# Patient Record
Sex: FEMALE | Race: BLACK OR AFRICAN AMERICAN | NOT HISPANIC OR LATINO | Employment: FULL TIME | URBAN - METROPOLITAN AREA
[De-identification: names, ages, dates, MRNs, and addresses within clinical notes are randomized per-mention and may not be internally consistent; named-entity substitution may affect disease eponyms.]

---

## 2017-07-23 ENCOUNTER — HOSPITAL ENCOUNTER (EMERGENCY)
Facility: HOSPITAL | Age: 32
Discharge: HOME | End: 2017-07-24
Attending: EMERGENCY MEDICINE
Payer: MEDICAID

## 2017-07-23 DIAGNOSIS — R07.2 SUBSTERNAL CHEST PAIN: Primary | ICD-10-CM

## 2017-07-23 DIAGNOSIS — K21.00 GERD WITH ESOPHAGITIS: ICD-10-CM

## 2017-07-23 LAB
BASOPHILS # BLD AUTO: 0.01 K/UL
BASOPHILS NFR BLD: 0.1 %
DIFFERENTIAL METHOD: ABNORMAL
EOSINOPHIL # BLD AUTO: 0.1 K/UL
EOSINOPHIL NFR BLD: 1 %
ERYTHROCYTE [DISTWIDTH] IN BLOOD BY AUTOMATED COUNT: 12.5 %
HCT VFR BLD AUTO: 39.8 %
HGB BLD-MCNC: 12.8 G/DL
LYMPHOCYTES # BLD AUTO: 3.6 K/UL
LYMPHOCYTES NFR BLD: 50.4 %
MCH RBC QN AUTO: 27.7 PG
MCHC RBC AUTO-ENTMCNC: 32.2 G/DL
MCV RBC AUTO: 86 FL
MONOCYTES # BLD AUTO: 0.4 K/UL
MONOCYTES NFR BLD: 5 %
NEUTROPHILS # BLD AUTO: 3.1 K/UL
NEUTROPHILS NFR BLD: 43.5 %
PLATELET # BLD AUTO: 245 K/UL
PMV BLD AUTO: 11 FL
RBC # BLD AUTO: 4.62 M/UL
WBC # BLD AUTO: 7.22 K/UL

## 2017-07-23 PROCEDURE — 83880 ASSAY OF NATRIURETIC PEPTIDE: CPT

## 2017-07-23 PROCEDURE — 25000003 PHARM REV CODE 250: Performed by: EMERGENCY MEDICINE

## 2017-07-23 PROCEDURE — 85025 COMPLETE CBC W/AUTO DIFF WBC: CPT

## 2017-07-23 PROCEDURE — 84484 ASSAY OF TROPONIN QUANT: CPT

## 2017-07-23 PROCEDURE — 93005 ELECTROCARDIOGRAM TRACING: CPT

## 2017-07-23 PROCEDURE — 93010 ELECTROCARDIOGRAM REPORT: CPT | Mod: ,,, | Performed by: INTERNAL MEDICINE

## 2017-07-23 PROCEDURE — 81025 URINE PREGNANCY TEST: CPT | Performed by: EMERGENCY MEDICINE

## 2017-07-23 PROCEDURE — 80053 COMPREHEN METABOLIC PANEL: CPT

## 2017-07-23 PROCEDURE — 99284 EMERGENCY DEPT VISIT MOD MDM: CPT

## 2017-07-23 RX ORDER — ASPIRIN 325 MG
325 TABLET ORAL
Status: COMPLETED | OUTPATIENT
Start: 2017-07-23 | End: 2017-07-23

## 2017-07-23 RX ORDER — ASPIRIN 325 MG
325 TABLET ORAL
Status: DISCONTINUED | OUTPATIENT
Start: 2017-07-23 | End: 2017-07-23

## 2017-07-23 RX ADMIN — ASPIRIN 325 MG ORAL TABLET 325 MG: 325 PILL ORAL at 11:07

## 2017-07-23 RX ADMIN — LIDOCAINE HYDROCHLORIDE: 20 SOLUTION ORAL; TOPICAL at 11:07

## 2017-07-24 VITALS
BODY MASS INDEX: 40.22 KG/M2 | TEMPERATURE: 99 F | HEART RATE: 88 BPM | OXYGEN SATURATION: 96 % | SYSTOLIC BLOOD PRESSURE: 173 MMHG | HEIGHT: 63 IN | DIASTOLIC BLOOD PRESSURE: 89 MMHG | RESPIRATION RATE: 16 BRPM | WEIGHT: 227 LBS

## 2017-07-24 LAB
ALBUMIN SERPL BCP-MCNC: 3.3 G/DL
ALP SERPL-CCNC: 131 U/L
ALT SERPL W/O P-5'-P-CCNC: 17 U/L
ANION GAP SERPL CALC-SCNC: 7 MMOL/L
AST SERPL-CCNC: 10 U/L
B-HCG UR QL: NEGATIVE
BILIRUB SERPL-MCNC: 0.3 MG/DL
BNP SERPL-MCNC: <10 PG/ML
BUN SERPL-MCNC: 12 MG/DL
CALCIUM SERPL-MCNC: 9.4 MG/DL
CHLORIDE SERPL-SCNC: 101 MMOL/L
CO2 SERPL-SCNC: 26 MMOL/L
CREAT SERPL-MCNC: 1.1 MG/DL
CTP QC/QA: YES
EST. GFR  (AFRICAN AMERICAN): >60 ML/MIN/1.73 M^2
EST. GFR  (NON AFRICAN AMERICAN): >60 ML/MIN/1.73 M^2
GLUCOSE SERPL-MCNC: 413 MG/DL
POTASSIUM SERPL-SCNC: 3.8 MMOL/L
PROT SERPL-MCNC: 7.7 G/DL
SODIUM SERPL-SCNC: 134 MMOL/L
TROPONIN I SERPL DL<=0.01 NG/ML-MCNC: 0.01 NG/ML

## 2017-07-24 NOTE — ED PROVIDER NOTES
Encounter Date: 2017    SCRIBE #1 NOTE: I, Clarke Ledezma, am scribing for, and in the presence of,  Benny Guzman MD. I have scribed the following portions of the note - Other sections scribed: HPI, ROS.       History     Chief Complaint   Patient presents with    Chest Pain     pt comes to the er with chest pain that started today at 2pm the pain also goes down to her right arm.      CC: Chest Pain    HPI: This 31 y.o. female with a medical history of Diabetes mellitus; Fibroid, uterine; and Ovarian cyst presents to the ED c/o acute onset mid-sternal chest pain with difficulty taking deep breaths that began after getting out of the shower 9 hours ago. Chest pain feels sharp. Denies any chest pain with palpation. Denies any recent long distance travel. Denies fever, shortness of breath, leg swelling, nausea, dysuria, urinary frequency increase, or any other associated symptoms. Pt has no modifying factors. Pt has no prior treatment.       The history is provided by the patient. No  was used.     Review of patient's allergies indicates:  No Known Allergies  Past Medical History:   Diagnosis Date    Diabetes mellitus     Fibroid, uterine     Ovarian cyst      Past Surgical History:   Procedure Laterality Date     SECTION, CLASSIC      x 2    CYST REMOVAL      from both breast x 2     Family History   Problem Relation Age of Onset    Hypertension Mother     Diabetes Father     Sickle cell trait Brother     Diabetes Maternal Grandmother      Social History   Substance Use Topics    Smoking status: Never Smoker    Smokeless tobacco: Never Used    Alcohol use Yes      Comment: occasionally     Review of Systems   Constitutional: Negative for fever.   HENT: Negative for sore throat.    Respiratory: Negative for shortness of breath.    Cardiovascular: Positive for chest pain. Negative for leg swelling.   Gastrointestinal: Negative for nausea.   Genitourinary: Negative for  dysuria and frequency.   Musculoskeletal: Negative for back pain.   Skin: Negative for rash.   Neurological: Negative for weakness.   Hematological: Does not bruise/bleed easily.   All other systems reviewed and are negative.      Physical Exam     Initial Vitals [07/23/17 2301]   BP Pulse Resp Temp SpO2   126/78 93 16 98.5 °F (36.9 °C) 99 %      MAP       94         Physical Exam  Nursing note and vitals reviewed.  Constitutional:   HENT:    Head: NC/AT    Eyes: Conjunctivae normal.   (-) scleral icterus.              Mouth/Throat: MMM.     Neck: Neck supple, normal rom.   Cardiovascular: RRR  Pulmonary/Chest: CTAB   Abdominal: Soft. ND/NT w/o guarding or rebound.  (-) CVA tenderness.  Musculoskeletal: FROM of all major joints. No extremity edema or tenderness.  Neurological: A&Ox4, Normal speech.  No focal motor deficits.  Normal gait  Skin: Skin is warm and dry.   Psychiatric: normal mood and affect.      ED Course   Procedures  Labs Reviewed   CBC W/ AUTO DIFFERENTIAL - Abnormal; Notable for the following:        Result Value    Lymph% 50.4 (*)     All other components within normal limits   COMPREHENSIVE METABOLIC PANEL - Abnormal; Notable for the following:     Sodium 134 (*)     Glucose 413 (*)     Albumin 3.3 (*)     Anion Gap 7 (*)     All other components within normal limits   TROPONIN I   B-TYPE NATRIURETIC PEPTIDE   POCT URINE PREGNANCY     EKG Readings: (Independently Interpreted)   Initial Reading: No STEMI.   Normal sinus rhythm, rate 88, normal axis/intervals, nonspecific T-wave flattening.       X-Rays:   Independently Interpreted Readings:   Chest X-Ray: No infiltrates.  No acute abnormalities.  Normal heart size.     Medical Decision Making:   History:   Old Medical Records: I decided to obtain old medical records.  Old Records Summarized: records from clinic visits and other records.  Independently Interpreted Test(s):   I have ordered and independently interpreted X-rays - see prior notes.  I  have ordered and independently interpreted EKG Reading(s) - see prior notes  Clinical Tests:   Lab Tests: Ordered and Reviewed  Radiological Study: Ordered and Reviewed  Medical Tests: Ordered and Reviewed    Differential Diagnosis:   Initial differential diagnosis includes but is not limited to...ACS/MI, aortic dissection, pericarditis, PE, pneumothorax, pneumonia, costochondritis, gastritis, GERD, pancreatitis.      Additional Medical Decision Makin-year-old female presents the emergency department complaining of burning substernal chest pain. Basic labs wnls. Troponin negative. Chest x-ray without acute findings. After having received a GI cocktail, she reports complete symptomatic relief. Findings at this time are most consistent with GERD. Recommend daily H2-blocker and close follow-up with PCP. Chest pain precautions as well as return instructions discussed prior to discharge.              Scribe Attestation:   Scribe #1: I performed the above scribed service and the documentation accurately describes the services I performed. I attest to the accuracy of the note.    Attending Attestation:           Physician Attestation for Scribe:  Physician Attestation Statement for Scribe #1: I, Benny Guzman MD, reviewed documentation, as scribed by Clarke Ledezma in my presence, and it is both accurate and complete.                 ED Course     Clinical Impression:   The primary encounter diagnosis was Substernal chest pain. A diagnosis of GERD with esophagitis was also pertinent to this visit.    Disposition:   Disposition: Discharged                        Benny Guzman MD  17 [id number]

## 2017-07-24 NOTE — ED TRIAGE NOTES
Pt prenets to Ed with Left sided Chest pain that started about 2pm that hurts when she takes a deep breath or laugh. Reports that it was radiating down her left arm earlier.  Also reports back pain. Pt states that she had a massage on yesterday. Denies N/V, jaw pain, dizziness or weakness. Will continue to monitor.

## 2017-10-10 ENCOUNTER — HOSPITAL ENCOUNTER (EMERGENCY)
Facility: HOSPITAL | Age: 32
Discharge: HOME | End: 2017-10-11
Attending: EMERGENCY MEDICINE
Payer: MEDICAID

## 2017-10-10 VITALS
SYSTOLIC BLOOD PRESSURE: 120 MMHG | HEIGHT: 63 IN | BODY MASS INDEX: 39.87 KG/M2 | WEIGHT: 225 LBS | RESPIRATION RATE: 18 BRPM | TEMPERATURE: 99 F | DIASTOLIC BLOOD PRESSURE: 71 MMHG | OXYGEN SATURATION: 97 % | HEART RATE: 95 BPM

## 2017-10-10 DIAGNOSIS — Z91.148 NONCOMPLIANCE WITH MEDICATIONS: ICD-10-CM

## 2017-10-10 DIAGNOSIS — R73.9 HYPERGLYCEMIA: ICD-10-CM

## 2017-10-10 DIAGNOSIS — L02.416 ABSCESS OF LEFT THIGH: Primary | ICD-10-CM

## 2017-10-10 DIAGNOSIS — Z86.39 HISTORY OF DIABETES MELLITUS: ICD-10-CM

## 2017-10-10 LAB — POCT GLUCOSE: 329 MG/DL (ref 70–110)

## 2017-10-10 PROCEDURE — 10061 I&D ABSCESS COMP/MULTIPLE: CPT

## 2017-10-10 PROCEDURE — 99283 EMERGENCY DEPT VISIT LOW MDM: CPT | Mod: 25

## 2017-10-10 PROCEDURE — 82962 GLUCOSE BLOOD TEST: CPT

## 2017-10-11 LAB
B-HCG UR QL: NEGATIVE
CTP QC/QA: YES

## 2017-10-11 PROCEDURE — 10061 I&D ABSCESS COMP/MULTIPLE: CPT

## 2017-10-11 PROCEDURE — 25000003 PHARM REV CODE 250: Performed by: PHYSICIAN ASSISTANT

## 2017-10-11 PROCEDURE — 81025 URINE PREGNANCY TEST: CPT | Performed by: PHYSICIAN ASSISTANT

## 2017-10-11 RX ORDER — ACETAMINOPHEN 325 MG/1
650 TABLET ORAL
Status: COMPLETED | OUTPATIENT
Start: 2017-10-11 | End: 2017-10-11

## 2017-10-11 RX ORDER — LIDOCAINE HYDROCHLORIDE 10 MG/ML
10 INJECTION INFILTRATION; PERINEURAL
Status: COMPLETED | OUTPATIENT
Start: 2017-10-11 | End: 2017-10-11

## 2017-10-11 RX ORDER — SULFAMETHOXAZOLE AND TRIMETHOPRIM 800; 160 MG/1; MG/1
1 TABLET ORAL 2 TIMES DAILY
Qty: 20 TABLET | Refills: 0 | Status: SHIPPED | OUTPATIENT
Start: 2017-10-11 | End: 2017-10-21

## 2017-10-11 RX ORDER — SULFAMETHOXAZOLE AND TRIMETHOPRIM 800; 160 MG/1; MG/1
1 TABLET ORAL
Status: COMPLETED | OUTPATIENT
Start: 2017-10-11 | End: 2017-10-11

## 2017-10-11 RX ADMIN — LIDOCAINE HYDROCHLORIDE 10 ML: 10 INJECTION, SOLUTION INFILTRATION; PERINEURAL at 12:10

## 2017-10-11 RX ADMIN — ACETAMINOPHEN 650 MG: 325 TABLET ORAL at 12:10

## 2017-10-11 RX ADMIN — SULFAMETHOXAZOLE AND TRIMETHOPRIM 1 TABLET: 800; 160 TABLET ORAL at 12:10

## 2017-10-11 NOTE — ED PROVIDER NOTES
Encounter Date: 10/10/2017    SCRIBE #1 NOTE: I, Florentin Mir, am scribing for, and in the presence of,  Judd Gan PA-C. I have scribed the following portions of the note - Other sections scribed: HPI, ROS.       History     Chief Complaint   Patient presents with    Abscess     Pt reports an abscess on left inner thigh area with pain onset couple day ago.     CC: Abscess    31 y/o female with DM, uterine fibroid, and ovarian cyst presents to the ED c/o 2 month hx of acute onset abscess to L sided inner thigh with associated pain that radiates down L leg. The pain is severe (9/10) and progressively worsened today; palpation exacerbates the pain. The patient reports hx of abscess to her axilla, where an I&D was done. The patient's last dose of metformin was 5 days ago because she states she went to [place] and forgot the medication. The patient denies fever, N/V/D, chest pain, SOB, abdominal pain, dysuria, or difficulty urinating. No attempted treatment reported. No alleviating factors or other symptoms reported.      The history is provided by the patient. No  was used.     Review of patient's allergies indicates:  No Known Allergies  Past Medical History:   Diagnosis Date    Diabetes mellitus     Fibroid, uterine     Ovarian cyst      Past Surgical History:   Procedure Laterality Date     SECTION, CLASSIC      x 2    CYST REMOVAL      from both breast x 2     Family History   Problem Relation Age of Onset    Hypertension Mother     Diabetes Father     Sickle cell trait Brother     Diabetes Maternal Grandmother      Social History   Substance Use Topics    Smoking status: Never Smoker    Smokeless tobacco: Never Used    Alcohol use Yes      Comment: occasionally     Review of Systems   Constitutional: Negative for chills and fever.   HENT: Negative for rhinorrhea.    Eyes: Negative for redness.   Respiratory: Negative for cough and shortness of breath.     Cardiovascular: Negative for chest pain.   Gastrointestinal: Negative for abdominal pain, diarrhea, nausea and vomiting.   Genitourinary: Negative for difficulty urinating and dyspareunia.   Musculoskeletal: Negative for back pain.   Skin: Negative for rash.        (+) abscess to L sided inner thigh with associated pain that radiates down L leg   Neurological: Negative for headaches.       Physical Exam     Initial Vitals [10/10/17 2323]   BP Pulse Resp Temp SpO2   120/71 95 18 98.7 °F (37.1 °C) 97 %      MAP       87.33         Physical Exam    Nursing note and vitals reviewed.  Constitutional: She appears well-developed and well-nourished. She is not diaphoretic. No distress.   HENT:   Head: Normocephalic and atraumatic.   Nose: Nose normal.   Eyes: Conjunctivae and EOM are normal. Right eye exhibits no discharge. Left eye exhibits no discharge.   Neck: Normal range of motion. No tracheal deviation present. No JVD present.   Cardiovascular: Normal rate, regular rhythm and normal heart sounds. Exam reveals no friction rub.    No murmur heard.  Pulmonary/Chest: Breath sounds normal. No stridor. No respiratory distress. She has no wheezes. She has no rhonchi. She has no rales. She exhibits no tenderness.   Musculoskeletal: Normal range of motion.   Neurological: She is alert and oriented to person, place, and time.   Skin: Skin is warm and dry. No pallor.        3 cm x 1 similar linear area of erythema, tenderness, and warmth.  There is a 1 similar area of fluctuance.  No purulent drainage.         ED Course   I & D - Incision and Drainage  Date/Time: 10/11/2017 1:17 AM  Location procedure was performed: [facility] EMERGENCY DEPARTMENT  Performed by: SAM NOVAK  Authorized by: FRANCESCO WHITE   Consent Done: Yes  Consent: Verbal consent obtained.  Consent given by: patient  Type: abscess  Location: L proximal medial thigh.  Anesthesia: local infiltration    Anesthesia:  Local Anesthetic: lidocaine 1% without  epinephrine  Anesthetic total: 3 mL  Patient sedated: no  Description of findings: fluctuance   Risk factor: underlying major vessel  Scalpel size: 11  Incision type: single straight  Complexity: complex  Drainage: pus  Drainage amount: moderate  Wound treatment: incision,  wound left open,  deloculation and  expression of material  Packing material: 1/4 in gauze  Complications: No  Specimens: No  Implants: No  Patient tolerance: Patient tolerated the procedure well with no immediate complications        Labs Reviewed   POCT GLUCOSE - Abnormal; Notable for the following:        Result Value    POCT Glucose 329 (*)     All other components within normal limits   POCT URINE PREGNANCY             Medical Decision Making:   History:   Old Medical Records: I decided to obtain old medical records.      This is an emergent evaluation of a 32 y.o. female with a PMHx of DM presenting to the ED for abscess. Denies fever and urinary symptoms. Vitals WNL, afebrile. Patient is non-toxic appearing and in no acute distress. There is a fluctuant mass. with mild overlying cellulitis. No symptoms for systemic infection or evidence of necrotizing fasciitis.      Abscess drained and packed. Tetanus UTD per patient. Incidental hyperglycemia in this patient that has been off of her metformin for several days while on vacation. No evidence to suggest DKA at this time; advising she continue her metformin that she states she has at home. Discharged with Bactrim. Instructed to follow up with PCP for packing removal in 48 hours.     I discussed with the patient the diagnosis, treatment plan, indications for return to the emergency department, and for expected follow-up. The patient verbalized an understanding. The patient is asked if there are any questions or concerns. We discuss the case, until all issues are addressed to the patients satisfaction. Patient understands and is agreeable to the plan.     I discussed this patient with   Cutler who is in agreement with my assessment and plan.          Scribe Attestation:   Scribe #1: I performed the above scribed service and the documentation accurately describes the services I performed. I attest to the accuracy of the note.    Attending Attestation:           Physician Attestation for Scribe:  Physician Attestation Statement for Scribe #1: I, Judd Gan PA-C, reviewed documentation, as scribed by Florentin Mir in my presence, and it is both accurate and complete.                 ED Course      Clinical Impression:   The primary encounter diagnosis was Abscess of left thigh. Diagnoses of Hyperglycemia, Noncompliance with medications, and History of diabetes mellitus were also pertinent to this visit.    Disposition:   Disposition: Discharged  Condition: Stable                        Judd Villar PA-C  10/11/17 0118

## 2017-10-11 NOTE — ED TRIAGE NOTES
"Pt c/o LLE inner upper thigh abscess which has been 2 months and been getting worse.  States that it has began to hurt "worse" this evening.   Pt rates pain as 10 when ambulation  "

## 2017-11-14 ENCOUNTER — HOSPITAL ENCOUNTER (OUTPATIENT)
Facility: HOSPITAL | Age: 32
LOS: 1 days | Discharge: HOME | End: 2017-11-15
Attending: EMERGENCY MEDICINE | Admitting: INTERNAL MEDICINE
Payer: MEDICAID

## 2017-11-14 DIAGNOSIS — R19.7 DIARRHEA, UNSPECIFIED TYPE: ICD-10-CM

## 2017-11-14 DIAGNOSIS — K52.9 ENTERITIS: Primary | ICD-10-CM

## 2017-11-14 DIAGNOSIS — R73.9 HYPERGLYCEMIA: ICD-10-CM

## 2017-11-14 DIAGNOSIS — R10.9 ABDOMINAL PAIN, UNSPECIFIED ABDOMINAL LOCATION: ICD-10-CM

## 2017-11-14 DIAGNOSIS — R00.0 TACHYCARDIA: ICD-10-CM

## 2017-11-14 DIAGNOSIS — E86.0 DEHYDRATION: ICD-10-CM

## 2017-11-14 DIAGNOSIS — R07.9 CHEST PAIN: ICD-10-CM

## 2017-11-14 LAB
ALBUMIN SERPL BCP-MCNC: 4 G/DL
ALP SERPL-CCNC: 158 U/L
ALT SERPL W/O P-5'-P-CCNC: 26 U/L
AMORPH CRY URNS QL MICRO: NORMAL
ANION GAP SERPL CALC-SCNC: 12 MMOL/L
AST SERPL-CCNC: 19 U/L
B-HCG UR QL: NEGATIVE
BACTERIA #/AREA URNS HPF: NORMAL /HPF
BASOPHILS # BLD AUTO: 0.01 K/UL
BASOPHILS NFR BLD: 0.1 %
BILIRUB SERPL-MCNC: 0.6 MG/DL
BILIRUB UR QL STRIP: NEGATIVE
BNP SERPL-MCNC: <10 PG/ML
BUN SERPL-MCNC: 12 MG/DL
CALCIUM SERPL-MCNC: 9.7 MG/DL
CHLORIDE SERPL-SCNC: 99 MMOL/L
CLARITY UR: ABNORMAL
CO2 SERPL-SCNC: 25 MMOL/L
COLOR UR: YELLOW
CREAT SERPL-MCNC: 0.9 MG/DL
CTP QC/QA: YES
DIFFERENTIAL METHOD: ABNORMAL
EOSINOPHIL # BLD AUTO: 0 K/UL
EOSINOPHIL NFR BLD: 0.5 %
ERYTHROCYTE [DISTWIDTH] IN BLOOD BY AUTOMATED COUNT: 12.2 %
EST. GFR  (AFRICAN AMERICAN): >60 ML/MIN/1.73 M^2
EST. GFR  (NON AFRICAN AMERICAN): >60 ML/MIN/1.73 M^2
GLUCOSE SERPL-MCNC: 244 MG/DL
GLUCOSE UR QL STRIP: ABNORMAL
HCT VFR BLD AUTO: 45.5 %
HGB BLD-MCNC: 15.3 G/DL
HGB UR QL STRIP: NEGATIVE
KETONES UR QL STRIP: NEGATIVE
LACTATE SERPL-SCNC: 1.3 MMOL/L
LEUKOCYTE ESTERASE UR QL STRIP: NEGATIVE
LIPASE SERPL-CCNC: 42 U/L
LYMPHOCYTES # BLD AUTO: 1.5 K/UL
LYMPHOCYTES NFR BLD: 20.7 %
MCH RBC QN AUTO: 28.1 PG
MCHC RBC AUTO-ENTMCNC: 33.6 G/DL
MCV RBC AUTO: 84 FL
MICROSCOPIC COMMENT: NORMAL
MONOCYTES # BLD AUTO: 0.4 K/UL
MONOCYTES NFR BLD: 4.9 %
NEUTROPHILS # BLD AUTO: 5.5 K/UL
NEUTROPHILS NFR BLD: 73.7 %
NITRITE UR QL STRIP: NEGATIVE
PH UR STRIP: 5 [PH] (ref 5–8)
PLATELET # BLD AUTO: 274 K/UL
PMV BLD AUTO: 10.6 FL
POTASSIUM SERPL-SCNC: 4 MMOL/L
PROT SERPL-MCNC: 9.2 G/DL
PROT UR QL STRIP: NEGATIVE
RBC # BLD AUTO: 5.44 M/UL
SODIUM SERPL-SCNC: 136 MMOL/L
SP GR UR STRIP: >1.03 (ref 1–1.03)
TROPONIN I SERPL DL<=0.01 NG/ML-MCNC: <0.006 NG/ML
URN SPEC COLLECT METH UR: ABNORMAL
UROBILINOGEN UR STRIP-ACNC: NEGATIVE EU/DL
WBC # BLD AUTO: 7.4 K/UL
YEAST URNS QL MICRO: NORMAL

## 2017-11-14 PROCEDURE — 25000003 PHARM REV CODE 250: Performed by: EMERGENCY MEDICINE

## 2017-11-14 PROCEDURE — 96375 TX/PRO/DX INJ NEW DRUG ADDON: CPT

## 2017-11-14 PROCEDURE — 25500020 PHARM REV CODE 255: Performed by: EMERGENCY MEDICINE

## 2017-11-14 PROCEDURE — G0378 HOSPITAL OBSERVATION PER HR: HCPCS

## 2017-11-14 PROCEDURE — 83880 ASSAY OF NATRIURETIC PEPTIDE: CPT

## 2017-11-14 PROCEDURE — 99285 EMERGENCY DEPT VISIT HI MDM: CPT | Mod: 25

## 2017-11-14 PROCEDURE — 12000002 HC ACUTE/MED SURGE SEMI-PRIVATE ROOM

## 2017-11-14 PROCEDURE — 93005 ELECTROCARDIOGRAM TRACING: CPT

## 2017-11-14 PROCEDURE — S0028 INJECTION, FAMOTIDINE, 20 MG: HCPCS | Performed by: EMERGENCY MEDICINE

## 2017-11-14 PROCEDURE — 83605 ASSAY OF LACTIC ACID: CPT

## 2017-11-14 PROCEDURE — 96365 THER/PROPH/DIAG IV INF INIT: CPT

## 2017-11-14 PROCEDURE — 93010 ELECTROCARDIOGRAM REPORT: CPT | Mod: ,,, | Performed by: INTERNAL MEDICINE

## 2017-11-14 PROCEDURE — 81025 URINE PREGNANCY TEST: CPT | Performed by: EMERGENCY MEDICINE

## 2017-11-14 PROCEDURE — 96361 HYDRATE IV INFUSION ADD-ON: CPT

## 2017-11-14 PROCEDURE — 85025 COMPLETE CBC W/AUTO DIFF WBC: CPT

## 2017-11-14 PROCEDURE — 84484 ASSAY OF TROPONIN QUANT: CPT

## 2017-11-14 PROCEDURE — 81000 URINALYSIS NONAUTO W/SCOPE: CPT

## 2017-11-14 PROCEDURE — 83690 ASSAY OF LIPASE: CPT

## 2017-11-14 PROCEDURE — 80053 COMPREHEN METABOLIC PANEL: CPT

## 2017-11-14 PROCEDURE — 63600175 PHARM REV CODE 636 W HCPCS: Performed by: EMERGENCY MEDICINE

## 2017-11-14 PROCEDURE — 96372 THER/PROPH/DIAG INJ SC/IM: CPT

## 2017-11-14 PROCEDURE — 87040 BLOOD CULTURE FOR BACTERIA: CPT

## 2017-11-14 RX ORDER — HYDROMORPHONE HYDROCHLORIDE 2 MG/ML
1 INJECTION, SOLUTION INTRAMUSCULAR; INTRAVENOUS; SUBCUTANEOUS
Status: COMPLETED | OUTPATIENT
Start: 2017-11-14 | End: 2017-11-14

## 2017-11-14 RX ORDER — DICYCLOMINE HYDROCHLORIDE 20 MG/1
20 TABLET ORAL 2 TIMES DAILY
Qty: 15 TABLET | Refills: 0 | Status: SHIPPED | OUTPATIENT
Start: 2017-11-14 | End: 2017-11-15 | Stop reason: HOSPADM

## 2017-11-14 RX ORDER — CIPROFLOXACIN 2 MG/ML
400 INJECTION, SOLUTION INTRAVENOUS
Status: DISCONTINUED | OUTPATIENT
Start: 2017-11-14 | End: 2017-11-15

## 2017-11-14 RX ORDER — FAMOTIDINE 10 MG/ML
20 INJECTION INTRAVENOUS
Status: COMPLETED | OUTPATIENT
Start: 2017-11-14 | End: 2017-11-14

## 2017-11-14 RX ORDER — DIPHENHYDRAMINE HYDROCHLORIDE 50 MG/ML
25 INJECTION INTRAMUSCULAR; INTRAVENOUS
Status: COMPLETED | OUTPATIENT
Start: 2017-11-14 | End: 2017-11-14

## 2017-11-14 RX ORDER — DICYCLOMINE HYDROCHLORIDE 10 MG/ML
20 INJECTION INTRAMUSCULAR
Status: COMPLETED | OUTPATIENT
Start: 2017-11-14 | End: 2017-11-14

## 2017-11-14 RX ORDER — ONDANSETRON 2 MG/ML
4 INJECTION INTRAMUSCULAR; INTRAVENOUS
Status: COMPLETED | OUTPATIENT
Start: 2017-11-14 | End: 2017-11-14

## 2017-11-14 RX ORDER — METRONIDAZOLE 500 MG/100ML
500 INJECTION, SOLUTION INTRAVENOUS
Status: DISCONTINUED | OUTPATIENT
Start: 2017-11-15 | End: 2017-11-15

## 2017-11-14 RX ORDER — SODIUM CHLORIDE 9 MG/ML
1000 INJECTION, SOLUTION INTRAVENOUS
Status: COMPLETED | OUTPATIENT
Start: 2017-11-14 | End: 2017-11-14

## 2017-11-14 RX ADMIN — SODIUM CHLORIDE 1000 ML: 0.9 INJECTION, SOLUTION INTRAVENOUS at 04:11

## 2017-11-14 RX ADMIN — CIPROFLOXACIN 400 MG: 2 INJECTION, SOLUTION INTRAVENOUS at 11:11

## 2017-11-14 RX ADMIN — SODIUM CHLORIDE 1000 ML: 0.9 INJECTION, SOLUTION INTRAVENOUS at 09:11

## 2017-11-14 RX ADMIN — HYDROMORPHONE HYDROCHLORIDE 1 MG: 2 INJECTION, SOLUTION INTRAMUSCULAR; INTRAVENOUS; SUBCUTANEOUS at 10:11

## 2017-11-14 RX ADMIN — FAMOTIDINE 20 MG: 10 INJECTION, SOLUTION INTRAVENOUS at 05:11

## 2017-11-14 RX ADMIN — SODIUM CHLORIDE 1000 ML: 0.9 INJECTION, SOLUTION INTRAVENOUS at 06:11

## 2017-11-14 RX ADMIN — ONDANSETRON 4 MG: 2 INJECTION INTRAMUSCULAR; INTRAVENOUS at 04:11

## 2017-11-14 RX ADMIN — DICYCLOMINE HYDROCHLORIDE 20 MG: 10 INJECTION INTRAMUSCULAR at 07:11

## 2017-11-14 RX ADMIN — DIPHENHYDRAMINE HYDROCHLORIDE 25 MG: 50 INJECTION, SOLUTION INTRAMUSCULAR; INTRAVENOUS at 06:11

## 2017-11-14 RX ADMIN — IOHEXOL 70 ML: 350 INJECTION, SOLUTION INTRAVENOUS at 09:11

## 2017-11-14 NOTE — ED TRIAGE NOTES
Pt with c/o diarrhea x 3 days, dysuria that began last night, chills, dizziness, low back pain, and body aches that began today. Pt denies fever.

## 2017-11-14 NOTE — ED PROVIDER NOTES
"Encounter Date: 2017    SCRIBE #1 NOTE: I, Sim Calabrese, am scribing for, and in the presence of,  Deidra Contreras PA-C. I have scribed the following portions of the note - Other sections scribed: HPI and ROS.       History     Chief Complaint   Patient presents with    Gastroesophageal Reflux     States since she woke up today she has acid reflux, chills, body aches, diarrhea, and nausea.    Chills    Generalized Body Aches     CC: Gastroesophageal Reflux    HPI: Patient is a 32 y.o. female with PMHx insulin-independent DM who presents to ED c/o generalized abdominal pain with associated nausea, SOB, and midsternal "burning." She collectively describes her symptoms as "indigestion." Patient additionally c/o chills and dizziness that started today as well as a 3-day history of diarrhea. Her abdominal pain is not relieved when she has bowel movements. The patient states that her child has recently complained of abdominal pain but otherwise no sick contact reported. She has not received a flu shot for this year.     Patient endorses having dysuria. No hematuria reported. Her last menstrual period was on 17. Her last blood sugar measurement was 260 mg/dl. She reports that her blood sugar normally hovers around 300 mg/dl. No treatment attempted at this time. She denies emesis, fever, congestion, cough, and blood in stool.      The history is provided by the patient. No  was used.     Review of patient's allergies indicates:  No Known Allergies  Past Medical History:   Diagnosis Date    Diabetes mellitus     Fibroid, uterine     Ovarian cyst      Past Surgical History:   Procedure Laterality Date     SECTION, CLASSIC      x 2    CYST REMOVAL      from both breast x 2     Family History   Problem Relation Age of Onset    Hypertension Mother     Diabetes Father     Sickle cell trait Brother     Diabetes Maternal Grandmother      Social History   Substance Use Topics    " Smoking status: Never Smoker    Smokeless tobacco: Never Used    Alcohol use Yes      Comment: occasionally     Review of Systems   Constitutional: Positive for chills. Negative for diaphoresis and fever.   HENT: Negative for congestion and sore throat.    Eyes: Negative for visual disturbance.   Respiratory: Positive for shortness of breath. Negative for cough.    Cardiovascular: Negative for chest pain.   Gastrointestinal: Positive for abdominal pain (generalized), diarrhea and nausea. Negative for blood in stool and vomiting.   Genitourinary: Positive for dysuria. Negative for hematuria.   Musculoskeletal: Negative for back pain.   Skin: Negative for rash.   Neurological: Positive for dizziness. Negative for weakness and headaches.   Hematological: Does not bruise/bleed easily.       Physical Exam     Initial Vitals [11/14/17 1527]   BP Pulse Resp Temp SpO2   138/87 (!) 111 16 98.7 °F (37.1 °C) 100 %      MAP       104         Physical Exam    Nursing note and vitals reviewed.  Constitutional: She appears well-developed and well-nourished. She is not diaphoretic. No distress.   This patient is sitting comfortably on the exam table, calm and cooperative.   HENT:   Head: Normocephalic and atraumatic.   Right Ear: External ear normal.   Left Ear: External ear normal.   Nose: Nose normal.   Mouth/Throat: Oropharynx is clear and moist.   Eyes: EOM are normal. Pupils are equal, round, and reactive to light.   Neck: Normal range of motion. Neck supple.   Cardiovascular: Regular rhythm.   Tachycardia   Pulmonary/Chest: Breath sounds normal. No respiratory distress. She has no wheezes. She has no rhonchi. She has no rales. She exhibits no tenderness.   Abdominal: Soft. Bowel sounds are normal. She exhibits no distension. There is tenderness (there is diffuse tenderness to palpation.  No focal tenderness to palpation). There is no rebound and no guarding.   Neurological: She is alert and oriented to person, place, and  time.   Skin: Skin is warm. Capillary refill takes less than 2 seconds. No erythema.         ED Course   Procedures  Labs Reviewed   CBC W/ AUTO DIFFERENTIAL - Abnormal; Notable for the following:        Result Value    RBC 5.44 (*)     Gran% 73.7 (*)     All other components within normal limits   COMPREHENSIVE METABOLIC PANEL - Abnormal; Notable for the following:     Glucose 244 (*)     Total Protein 9.2 (*)     Alkaline Phosphatase 158 (*)     All other components within normal limits   URINALYSIS - Abnormal; Notable for the following:     Appearance, UA Cloudy (*)     Specific Gravity, UA >1.030 (*)     Glucose, UA 3+ (*)     All other components within normal limits   LIPASE   URINALYSIS MICROSCOPIC   TROPONIN I   B-TYPE NATRIURETIC PEPTIDE   POCT URINE PREGNANCY     EKG Readings: (Independently Interpreted)   Initial Reading: No STEMI. Rhythm: Sinus Tachycardia. Heart Rate: 110. Ectopy: No Ectopy. Conduction: Normal.       X-Rays:   Independently Interpreted Readings:   Other Readings:  Chest x-ray reveals no acute cardiopulmonary processes.    Medical Decision Making:   Differential Diagnosis:   This is an emergent evaluation of a 32-year-old female presents to the emergency department complaining of generalized abdominal pain, nausea, midsternal burning that started this morning.  She also complains of a three-day history of diarrhea.    Previous medical records were obtained and reviewed.  This patient has a history of non-insulin-dependent diabetes.  The patient is currently afebrile and nontoxic in appearance.  At triage, she was tachycardic at 111 bpm.  On physical exam, she was sitting comfortably on the exam table.  Calm and cooperative.  She is tachycardic.  On exam of the abdomen there is some diffuse tenderness to palpation without focal tenderness noted.  There is no rebound or guarding.  There is no CVA tenderness appreciated.  The remaining physical exam is unremarkable.  An IV was started and  fluids are given.  Blood was drawn and urine was collected.  A chest x-ray was performed which revealed no acute cardiopulmonary processes.  An EKG revealed sinus tachycardia without any ST elevation changes.  Troponin and BNP were unremarkable.  A CBC is without leukocytosis or anemia.  CMP is remarkable for a glucose of 244.  The remaining CMP is unremarkable.  Lipase is noted to be 42.  Rapid urinalysis is without any evidence of urinary tract infection.      On reevaluation, the patient continued to complain of pain.  IV Pepcid was given with relief.  Also IM Bentyl was also administered.  The patient was also still tachycardic.  Therefore a second fluid bolus was administered in the emergency department.  This patient was also seen by Dr. Garcia.    I carefully considered but doubt acute appendicitis, ACS, acute cholecystitis, acute diverticulitis, urinary tract infection, pyelonephritis.  I do not believe that the patient has ovarian torsion, tubo-ovarian abscess or pelvic inflammatory disease, pneumonia.  I believe this is a viral illness.  I will encourage plenty of fluids, rest.  Strong abdominal return precautions were thoroughly reviewed with patient and she verbalized understanding and agreement.  She stable for discharge with PCP follow-up this week.  The patient was also instructed to talk to her primary care physician about her hyperglycemia.  Her non-insulin-dependent diabetes is not well-controlled at this time.  She is stable for discharge at this time.            Scribe Attestation:   Scribe #1: I performed the above scribed service and the documentation accurately describes the services I performed. I attest to the accuracy of the note.    Attending Attestation:           Physician Attestation for Scribe:  Physician Attestation Statement for Scribe #1: I, Deidra Contreras PA-C, reviewed documentation, as scribed by Sim Calabrese in my presence, and it is both accurate and complete.                  ED Course      Clinical Impression:   The primary encounter diagnosis was Abdominal pain, unspecified abdominal location. Diagnoses of Chest pain, Diarrhea, unspecified type, Chest pain with noncardiac features, and Hyperglycemia were also pertinent to this visit.    Disposition:   Disposition: Discharged  Condition: Stable                        Deidra Contreras PA-C  11/14/17 1935

## 2017-11-15 VITALS
HEART RATE: 92 BPM | HEIGHT: 64 IN | TEMPERATURE: 98 F | BODY MASS INDEX: 39.08 KG/M2 | WEIGHT: 228.88 LBS | DIASTOLIC BLOOD PRESSURE: 68 MMHG | RESPIRATION RATE: 18 BRPM | SYSTOLIC BLOOD PRESSURE: 119 MMHG | OXYGEN SATURATION: 96 %

## 2017-11-15 PROBLEM — B37.31 VAGINAL CANDIDIASIS: Status: ACTIVE | Noted: 2017-11-15

## 2017-11-15 PROBLEM — K76.0 HEPATIC STEATOSIS: Status: ACTIVE | Noted: 2017-11-15

## 2017-11-15 PROBLEM — I95.1 ORTHOSTATIC HYPOTENSION: Status: ACTIVE | Noted: 2017-11-15

## 2017-11-15 PROBLEM — E66.09 OBESITY DUE TO EXCESS CALORIES WITH SERIOUS COMORBIDITY: Status: ACTIVE | Noted: 2017-11-15

## 2017-11-15 PROBLEM — R00.0 SINUS TACHYCARDIA: Status: ACTIVE | Noted: 2017-11-15

## 2017-11-15 PROBLEM — R10.9 ABDOMINAL CRAMPING: Status: ACTIVE | Noted: 2017-11-15

## 2017-11-15 PROBLEM — I95.1 ORTHOSTATIC HYPOTENSION: Status: RESOLVED | Noted: 2017-11-15 | Resolved: 2017-11-15

## 2017-11-15 PROBLEM — R00.0 SINUS TACHYCARDIA: Status: RESOLVED | Noted: 2017-11-15 | Resolved: 2017-11-15

## 2017-11-15 PROBLEM — E11.65 UNCONTROLLED TYPE 2 DIABETES MELLITUS WITH HYPERGLYCEMIA, WITHOUT LONG-TERM CURRENT USE OF INSULIN: Status: ACTIVE | Noted: 2017-11-15

## 2017-11-15 PROBLEM — A09 DIARRHEA OF INFECTIOUS ORIGIN: Status: ACTIVE | Noted: 2017-11-15

## 2017-11-15 PROBLEM — A08.4 VIRAL ENTERITIS: Status: ACTIVE | Noted: 2017-11-14

## 2017-11-15 PROBLEM — M79.10 VIRAL MYALGIA: Status: ACTIVE | Noted: 2017-11-15

## 2017-11-15 PROBLEM — B97.89 VIRAL MYALGIA: Status: ACTIVE | Noted: 2017-11-15

## 2017-11-15 LAB
ALBUMIN SERPL BCP-MCNC: 2.7 G/DL
ALP SERPL-CCNC: 105 U/L
ALT SERPL W/O P-5'-P-CCNC: 23 U/L
ANION GAP SERPL CALC-SCNC: 9 MMOL/L
AST SERPL-CCNC: 17 U/L
BASOPHILS # BLD AUTO: 0.01 K/UL
BASOPHILS NFR BLD: 0.2 %
BILIRUB SERPL-MCNC: 0.5 MG/DL
BUN SERPL-MCNC: 8 MG/DL
CALCIUM SERPL-MCNC: 8 MG/DL
CHLORIDE SERPL-SCNC: 103 MMOL/L
CO2 SERPL-SCNC: 23 MMOL/L
CREAT SERPL-MCNC: 0.8 MG/DL
DIFFERENTIAL METHOD: NORMAL
EOSINOPHIL # BLD AUTO: 0 K/UL
EOSINOPHIL NFR BLD: 0.5 %
ERYTHROCYTE [DISTWIDTH] IN BLOOD BY AUTOMATED COUNT: 12.1 %
EST. GFR  (AFRICAN AMERICAN): >60 ML/MIN/1.73 M^2
EST. GFR  (NON AFRICAN AMERICAN): >60 ML/MIN/1.73 M^2
GLUCOSE SERPL-MCNC: 281 MG/DL
HCT VFR BLD AUTO: 38.8 %
HGB BLD-MCNC: 12.6 G/DL
LYMPHOCYTES # BLD AUTO: 2 K/UL
LYMPHOCYTES NFR BLD: 33.8 %
MCH RBC QN AUTO: 28 PG
MCHC RBC AUTO-ENTMCNC: 32.5 G/DL
MCV RBC AUTO: 86 FL
MONOCYTES # BLD AUTO: 0.4 K/UL
MONOCYTES NFR BLD: 7.4 %
NEUTROPHILS # BLD AUTO: 3.4 K/UL
NEUTROPHILS NFR BLD: 57.9 %
PLATELET # BLD AUTO: 237 K/UL
PMV BLD AUTO: 10.6 FL
POCT GLUCOSE: 238 MG/DL (ref 70–110)
POCT GLUCOSE: 265 MG/DL (ref 70–110)
POCT GLUCOSE: 272 MG/DL (ref 70–110)
POCT GLUCOSE: 277 MG/DL (ref 70–110)
POTASSIUM SERPL-SCNC: 3.8 MMOL/L
PROT SERPL-MCNC: 6.5 G/DL
RBC # BLD AUTO: 4.5 M/UL
SODIUM SERPL-SCNC: 135 MMOL/L
WBC # BLD AUTO: 5.8 K/UL

## 2017-11-15 PROCEDURE — 36415 COLL VENOUS BLD VENIPUNCTURE: CPT

## 2017-11-15 PROCEDURE — 63600175 PHARM REV CODE 636 W HCPCS: Performed by: EMERGENCY MEDICINE

## 2017-11-15 PROCEDURE — S0030 INJECTION, METRONIDAZOLE: HCPCS | Performed by: EMERGENCY MEDICINE

## 2017-11-15 PROCEDURE — G0378 HOSPITAL OBSERVATION PER HR: HCPCS

## 2017-11-15 PROCEDURE — 80053 COMPREHEN METABOLIC PANEL: CPT

## 2017-11-15 PROCEDURE — 25000003 PHARM REV CODE 250: Performed by: EMERGENCY MEDICINE

## 2017-11-15 PROCEDURE — 25000003 PHARM REV CODE 250: Performed by: INTERNAL MEDICINE

## 2017-11-15 PROCEDURE — 85025 COMPLETE CBC W/AUTO DIFF WBC: CPT

## 2017-11-15 PROCEDURE — 63600175 PHARM REV CODE 636 W HCPCS: Performed by: INTERNAL MEDICINE

## 2017-11-15 RX ORDER — IBUPROFEN 600 MG/1
600 TABLET ORAL ONCE
Status: COMPLETED | OUTPATIENT
Start: 2017-11-15 | End: 2017-11-15

## 2017-11-15 RX ORDER — MORPHINE SULFATE 10 MG/ML
2 INJECTION INTRAMUSCULAR; INTRAVENOUS; SUBCUTANEOUS EVERY 4 HOURS PRN
Status: DISCONTINUED | OUTPATIENT
Start: 2017-11-15 | End: 2017-11-15

## 2017-11-15 RX ORDER — IBUPROFEN 200 MG
16 TABLET ORAL
Status: DISCONTINUED | OUTPATIENT
Start: 2017-11-15 | End: 2017-11-15 | Stop reason: HOSPADM

## 2017-11-15 RX ORDER — INSULIN ASPART 100 [IU]/ML
1-10 INJECTION, SOLUTION INTRAVENOUS; SUBCUTANEOUS
Status: DISCONTINUED | OUTPATIENT
Start: 2017-11-15 | End: 2017-11-15 | Stop reason: HOSPADM

## 2017-11-15 RX ORDER — MORPHINE SULFATE 10 MG/ML
4 INJECTION INTRAMUSCULAR; INTRAVENOUS; SUBCUTANEOUS EVERY 4 HOURS PRN
Status: DISCONTINUED | OUTPATIENT
Start: 2017-11-15 | End: 2017-11-15

## 2017-11-15 RX ORDER — INSULIN ASPART 100 [IU]/ML
0-5 INJECTION, SOLUTION INTRAVENOUS; SUBCUTANEOUS
Status: DISCONTINUED | OUTPATIENT
Start: 2017-11-15 | End: 2017-11-15

## 2017-11-15 RX ORDER — ONDANSETRON 8 MG/1
8 TABLET, ORALLY DISINTEGRATING ORAL EVERY 8 HOURS PRN
Status: DISCONTINUED | OUTPATIENT
Start: 2017-11-15 | End: 2017-11-15 | Stop reason: HOSPADM

## 2017-11-15 RX ORDER — ASPIRIN 325 MG
1 TABLET, DELAYED RELEASE (ENTERIC COATED) ORAL ONCE
Status: COMPLETED | OUTPATIENT
Start: 2017-11-15 | End: 2017-11-15

## 2017-11-15 RX ORDER — FAMOTIDINE 20 MG/1
20 TABLET, FILM COATED ORAL DAILY
Status: DISCONTINUED | OUTPATIENT
Start: 2017-11-15 | End: 2017-11-15 | Stop reason: HOSPADM

## 2017-11-15 RX ORDER — IBUPROFEN 200 MG
24 TABLET ORAL
Status: DISCONTINUED | OUTPATIENT
Start: 2017-11-15 | End: 2017-11-15 | Stop reason: HOSPADM

## 2017-11-15 RX ORDER — ACETAMINOPHEN 325 MG/1
650 TABLET ORAL EVERY 8 HOURS PRN
Status: DISCONTINUED | OUTPATIENT
Start: 2017-11-15 | End: 2017-11-15 | Stop reason: HOSPADM

## 2017-11-15 RX ORDER — AMOXICILLIN 250 MG
1 CAPSULE ORAL 2 TIMES DAILY
Status: DISCONTINUED | OUTPATIENT
Start: 2017-11-15 | End: 2017-11-15

## 2017-11-15 RX ORDER — INSULIN ASPART 100 [IU]/ML
0-5 INJECTION, SOLUTION INTRAVENOUS; SUBCUTANEOUS EVERY 6 HOURS PRN
Status: DISCONTINUED | OUTPATIENT
Start: 2017-11-15 | End: 2017-11-15

## 2017-11-15 RX ORDER — INSULIN ASPART 100 [IU]/ML
2 INJECTION, SOLUTION INTRAVENOUS; SUBCUTANEOUS
Status: DISCONTINUED | OUTPATIENT
Start: 2017-11-15 | End: 2017-11-15 | Stop reason: HOSPADM

## 2017-11-15 RX ORDER — ENOXAPARIN SODIUM 100 MG/ML
40 INJECTION SUBCUTANEOUS EVERY 24 HOURS
Status: DISCONTINUED | OUTPATIENT
Start: 2017-11-15 | End: 2017-11-15 | Stop reason: HOSPADM

## 2017-11-15 RX ORDER — GLUCAGON 1 MG
1 KIT INJECTION
Status: DISCONTINUED | OUTPATIENT
Start: 2017-11-15 | End: 2017-11-15 | Stop reason: HOSPADM

## 2017-11-15 RX ORDER — SODIUM CHLORIDE 9 MG/ML
INJECTION, SOLUTION INTRAVENOUS CONTINUOUS
Status: DISCONTINUED | OUTPATIENT
Start: 2017-11-15 | End: 2017-11-15

## 2017-11-15 RX ORDER — SODIUM CHLORIDE 0.9 % (FLUSH) 0.9 %
3 SYRINGE (ML) INJECTION EVERY 8 HOURS
Status: DISCONTINUED | OUTPATIENT
Start: 2017-11-15 | End: 2017-11-15 | Stop reason: HOSPADM

## 2017-11-15 RX ORDER — HEPARIN SODIUM 5000 [USP'U]/ML
5000 INJECTION, SOLUTION INTRAVENOUS; SUBCUTANEOUS EVERY 8 HOURS
Status: DISCONTINUED | OUTPATIENT
Start: 2017-11-15 | End: 2017-11-15

## 2017-11-15 RX ORDER — ACETAMINOPHEN 325 MG/1
650 TABLET ORAL EVERY 6 HOURS PRN
Status: DISCONTINUED | OUTPATIENT
Start: 2017-11-15 | End: 2017-11-15 | Stop reason: HOSPADM

## 2017-11-15 RX ADMIN — INSULIN ASPART 4 UNITS: 100 INJECTION, SOLUTION INTRAVENOUS; SUBCUTANEOUS at 12:11

## 2017-11-15 RX ADMIN — METRONIDAZOLE 500 MG: 500 INJECTION, SOLUTION INTRAVENOUS at 08:11

## 2017-11-15 RX ADMIN — METRONIDAZOLE 500 MG: 500 INJECTION, SOLUTION INTRAVENOUS at 12:11

## 2017-11-15 RX ADMIN — INSULIN ASPART 6 UNITS: 100 INJECTION, SOLUTION INTRAVENOUS; SUBCUTANEOUS at 08:11

## 2017-11-15 RX ADMIN — CLOTRIMAZOLE 1 APPLICATOR: 1 CREAM VAGINAL at 10:11

## 2017-11-15 RX ADMIN — HEPARIN SODIUM 5000 UNITS: 5000 INJECTION, SOLUTION INTRAVENOUS; SUBCUTANEOUS at 06:11

## 2017-11-15 RX ADMIN — SODIUM CHLORIDE: 0.9 INJECTION, SOLUTION INTRAVENOUS at 06:11

## 2017-11-15 RX ADMIN — ACETAMINOPHEN 650 MG: 325 TABLET ORAL at 06:11

## 2017-11-15 RX ADMIN — CIPROFLOXACIN 400 MG: 2 INJECTION, SOLUTION INTRAVENOUS at 06:11

## 2017-11-15 RX ADMIN — INSULIN ASPART 2 UNITS: 100 INJECTION, SOLUTION INTRAVENOUS; SUBCUTANEOUS at 12:11

## 2017-11-15 RX ADMIN — IBUPROFEN 600 MG: 600 TABLET, FILM COATED ORAL at 08:11

## 2017-11-15 RX ADMIN — FAMOTIDINE 20 MG: 20 TABLET, FILM COATED ORAL at 08:11

## 2017-11-15 NOTE — DISCHARGE INSTRUCTIONS
Return to the ED if you abdominal pain changes or worsens in any way.  Return to the ED if you develop fever, chills.  Drink plenty of fluids to remain hydrated.  Take medication as prescribed.  Follow up with your doctor this week.  Rest.  Your blood sugar is elevated today.  You need to speak with your PCP regarding this.

## 2017-11-15 NOTE — ASSESSMENT & PLAN NOTE
Declined insulin this AM. Is only on 500 mg of metformin daily and is not compliant, per recent reports. Metformin will need to be gradually increased as outpatient once viral illness has resolved. Will do insulin while inpatient.

## 2017-11-15 NOTE — HPI
32 year old female with uncontrolled diabetes mellitus type 2 and morbid obesity (BMI 39) who presented with one day of abdominal cramps and diarrhea. Pain is located in lower quadrants. Is crampy and intermittent, usually with PO intake. Has watery diarrhea. No mucous or blood. Denied nausea or vomiting, but rather indigestion. Also with myalgia throughout her body. Reported her son had same symptoms. In the ED, patient was afebrile and non toxic appearing. Physical exam benign and did not meet sepsis criteria. All labs WNL, except for hyperglycemia in the 200s. She received IVFs boluses and was to be sent home. Developed sinus tachycardia in the 120s, therefore admitted for further IV hydration and management. CT abdomen with contrast showed possible early jejunal enteritis, but motion artifact could be a factor. No lymphadenopathy noted.

## 2017-11-15 NOTE — H&P
[facility] Hospital Medicine  History & Physical    Patient Name: Breanna Fitch  MRN: [id number]  Admission Date: 2017  Attending Physician: Bridget Mullins MD   Primary Care Provider: Osbaldo Burgess NP         Patient information was obtained from patient and ER records.     Subjective:     Principal Problem:Viral enteritis    Chief Complaint:   Chief Complaint   Patient presents with    Gastroesophageal Reflux     States since she woke up today she has acid reflux, chills, body aches, diarrhea, and nausea.    Chills    Generalized Body Aches        HPI: 32 year old female with uncontrolled diabetes mellitus type 2 and morbid obesity (BMI 39) who presented with one day of abdominal cramps and diarrhea. Pain is located in lower quadrants. Is crampy and intermittent, usually with PO intake. Has watery diarrhea. No mucous or blood. Denied nausea or vomiting, but rather indigestion. Also with myalgia throughout her body. Reported her son had same symptoms. In the ED, patient was afebrile and non toxic appearing. Physical exam benign and did not meet sepsis criteria. All labs WNL, except for hyperglycemia in the 200s. She received IVFs boluses and was to be sent home. Developed sinus tachycardia in the 120s, therefore admitted for further IV hydration and management. CT abdomen with contrast showed possible early jejunal enteritis, but motion artifact could be a factor. No lymphadenopathy noted.     Past Medical History:   Diagnosis Date    Diabetes mellitus     Fibroid, uterine     Ovarian cyst        Past Surgical History:   Procedure Laterality Date     SECTION, CLASSIC      x 2    CYST REMOVAL      from both breast x 2       Review of patient's allergies indicates:  No Known Allergies    No current facility-administered medications on file prior to encounter.      Current Outpatient Prescriptions on File Prior to Encounter   Medication Sig    metformin (GLUCOPHAGE) 500  MG tablet Take 500 mg by mouth once daily.    ranitidine (ZANTAC) 300 MG tablet Take 1 tablet (300 mg total) by mouth nightly.     Family History     Problem Relation (Age of Onset)    Diabetes Father, Maternal Grandmother    Hypertension Mother    Sickle cell trait Brother        Social History Main Topics    Smoking status: Never Smoker    Smokeless tobacco: Never Used    Alcohol use Yes      Comment: occasionally    Drug use:      Types: Marijuana      Comment: occassionally    Sexual activity: Yes     Partners: Male     Birth control/ protection: None     Review of Systems   Constitutional: Positive for appetite change. Negative for activity change, fatigue and fever.   Respiratory: Negative.    Cardiovascular: Negative.    Gastrointestinal: Positive for abdominal pain and diarrhea. Negative for nausea and vomiting.   Genitourinary: Negative.    Musculoskeletal: Positive for myalgias.   Skin: Negative.    Neurological: Negative.    Hematological: Negative.    Psychiatric/Behavioral: Negative.      Objective:     Vital Signs (Most Recent):  Temp: 97.9 °F (36.6 °C) (11/15/17 0812)  Pulse: 92 (11/15/17 0812)  Resp: 18 (11/15/17 0812)  BP: 119/68 (11/15/17 0812)  SpO2: 96 % (11/15/17 0812) Vital Signs (24h Range):  Temp:  [97.9 °F (36.6 °C)-99.8 °F (37.7 °C)] 97.9 °F (36.6 °C)  Pulse:  [] 92  Resp:  [16-18] 18  SpO2:  [94 %-100 %] 96 %  BP: (100-138)/(57-87) 119/68     Weight: 103.8 kg (228 lb 14.4 oz)  Body mass index is 39.29 kg/m².    Physical Exam   Constitutional: She is oriented to person, place, and time. She appears well-developed. No distress.   HENT:   Head: Normocephalic and atraumatic.   Mouth/Throat: Oropharynx is clear and moist.   Neck: Normal range of motion.   Cardiovascular: Normal rate, regular rhythm, normal heart sounds and intact distal pulses.    Pulmonary/Chest: Effort normal and breath sounds normal.   Abdominal: Soft. Bowel sounds are normal. She exhibits no distension and no  mass. There is no tenderness. There is no guarding.   Musculoskeletal: Normal range of motion. She exhibits no edema or tenderness.   Neurological: She is alert and oriented to person, place, and time.   Skin: Skin is warm and dry. She is not diaphoretic.   Psychiatric: She has a normal mood and affect. Her behavior is normal. Judgment and thought content normal.   Nursing note and vitals reviewed.       Significant Labs: All pertinent labs within the past 24 hours have been reviewed.    Significant Imaging: I have reviewed all pertinent imaging results/findings within the past 24 hours.  I have reviewed and interpreted all pertinent imaging results/findings within the past 24 hours.    Assessment/Plan:     * Viral enteritis    Symptomatology and sick contact with same symptoms are consistent with viral enteritis, which is self limiting. Abdominal exam is normal and pain is not severe. No n/v. Stop antibiotics. Will continue fluids and start a trial of full liquid diet. I do expect abdominal cramping with meals, but should be able to tolerate and keep herself hydrated. Is to advance as tolerated.           Abdominal cramping    2/2 some bowel inflammation          Diarrhea of infectious origin    As anpve          Viral myalgia    Feels better with hydration. Is 2/2 viral infection          Hepatic steatosis    Needs to lose weight and have better diabetes control. Nutrition consult as outpatient          Vaginal candidiasis    With pruritus. Antifungal cream          Obesity due to excess calories with serious comorbidity    Needs to lose weight as it is already negatively impacting her health. Nutrition consult as outpatient          Uncontrolled type 2 diabetes mellitus with hyperglycemia, without long-term current use of insulin    Declined insulin this AM. Is only on 500 mg of metformin daily and is not compliant, per recent reports. Metformin will need to be gradually increased as outpatient once viral illness  has resolved. Will do insulin while inpatient.             VTE Risk Mitigation         Ordered     enoxaparin injection 40 mg  Daily     Route:  Subcutaneous        11/15/17 0728     Medium Risk of VTE  Once      11/15/17 0154        Patient significantly improved since admission. Is more appropriate for OBS, and not inpatient, as she does not meet criteria for inpatient. Will discharge home if tolerates full liquid diet. Will need f/u with PCP    Bridget Martinez MD  Department of Hospital Medicine   [facility]

## 2017-11-15 NOTE — ASSESSMENT & PLAN NOTE
Symptomatology and sick contact with same symptoms are consistent with viral enteritis, which is self limiting. Abdominal exam is normal and pain is not severe. No n/v. Stop antibiotics. Will continue fluids and start a trial of full liquid diet. I do expect abdominal cramping with meals, but should be able to tolerate and keep herself hydrated. Is to advance as tolerated.

## 2017-11-15 NOTE — ASSESSMENT & PLAN NOTE
Needs to lose weight as it is already negatively impacting her health. Nutrition consult as outpatient

## 2017-11-15 NOTE — NURSING
Patient complaints of cramping in stomach after eating 30 -40% of breakfast.    MD notified of patient's cramping feeling.

## 2017-11-15 NOTE — HOSPITAL COURSE
Ms Fitch presented with a mild case of viral enteritis. Evaluated in the ED and was to be discharged but developed sinus tachycardia in the 120s despite 2L boluses of normal saline. Abdominal exam benign (even with deep palpation) and pain crampy, that was intermittent and not always present with meals. Abd CT with contrast (obtained in the ED) showed early jejunal enteritis vs motion artifact. Tachycardia resolved soon after admission. Tolerated full liquid diet. Had no nausea, vomiting or diarrhea. IVFs stopped and abx started in the ED discontinued. Patient's son had same symptoms just prior to hers. Myalgias also consistent with viral etiology rather than bacteria. Is to advance diet as tolerated. Needs better control of diabetes. Reports of non compliance with metformin, which she only takes 500 mg daily. Needs to increase dose, but this is to be done gradually as outpatient once acute illness resolved as this can also cause diarrhea. Patient denied diarrhea being associated with metformin, as she has taken this for a long time. Activity as tolerated. F/u with PCP within the next 7 days.

## 2017-11-15 NOTE — SUBJECTIVE & OBJECTIVE
Past Medical History:   Diagnosis Date    Diabetes mellitus     Fibroid, uterine     Ovarian cyst        Past Surgical History:   Procedure Laterality Date     SECTION, CLASSIC      x 2    CYST REMOVAL      from both breast x 2       Review of patient's allergies indicates:  No Known Allergies    No current facility-administered medications on file prior to encounter.      Current Outpatient Prescriptions on File Prior to Encounter   Medication Sig    metformin (GLUCOPHAGE) 500 MG tablet Take 500 mg by mouth once daily.    ranitidine (ZANTAC) 300 MG tablet Take 1 tablet (300 mg total) by mouth nightly.     Family History     Problem Relation (Age of Onset)    Diabetes Father, Maternal Grandmother    Hypertension Mother    Sickle cell trait Brother        Social History Main Topics    Smoking status: Never Smoker    Smokeless tobacco: Never Used    Alcohol use Yes      Comment: occasionally    Drug use:      Types: Marijuana      Comment: occassionally    Sexual activity: Yes     Partners: Male     Birth control/ protection: None     Review of Systems   Constitutional: Positive for appetite change. Negative for activity change, fatigue and fever.   Respiratory: Negative.    Cardiovascular: Negative.    Gastrointestinal: Positive for abdominal pain and diarrhea. Negative for nausea and vomiting.   Genitourinary: Negative.    Musculoskeletal: Positive for myalgias.   Skin: Negative.    Neurological: Negative.    Hematological: Negative.    Psychiatric/Behavioral: Negative.      Objective:     Vital Signs (Most Recent):  Temp: 97.9 °F (36.6 °C) (11/15/17 0812)  Pulse: 92 (11/15/17 0812)  Resp: 18 (11/15/17 0812)  BP: 119/68 (11/15/17 0812)  SpO2: 96 % (11/15/17 0812) Vital Signs (24h Range):  Temp:  [97.9 °F (36.6 °C)-99.8 °F (37.7 °C)] 97.9 °F (36.6 °C)  Pulse:  [] 92  Resp:  [16-18] 18  SpO2:  [94 %-100 %] 96 %  BP: (100-138)/(57-87) 119/68     Weight: 103.8 kg (228 lb 14.4 oz)  Body mass  index is 39.29 kg/m².    Physical Exam   Constitutional: She is oriented to person, place, and time. She appears well-developed. No distress.   HENT:   Head: Normocephalic and atraumatic.   Mouth/Throat: Oropharynx is clear and moist.   Neck: Normal range of motion.   Cardiovascular: Normal rate, regular rhythm, normal heart sounds and intact distal pulses.    Pulmonary/Chest: Effort normal and breath sounds normal.   Abdominal: Soft. Bowel sounds are normal. She exhibits no distension and no mass. There is no tenderness. There is no guarding.   Musculoskeletal: Normal range of motion. She exhibits no edema or tenderness.   Neurological: She is alert and oriented to person, place, and time.   Skin: Skin is warm and dry. She is not diaphoretic.   Psychiatric: She has a normal mood and affect. Her behavior is normal. Judgment and thought content normal.   Nursing note and vitals reviewed.       Significant Labs: All pertinent labs within the past 24 hours have been reviewed.    Significant Imaging: I have reviewed all pertinent imaging results/findings within the past 24 hours.  I have reviewed and interpreted all pertinent imaging results/findings within the past 24 hours.

## 2017-11-15 NOTE — DISCHARGE SUMMARY
[facility] Hospital Medicine  Discharge Summary      Patient Name: Breanna Fitch  MRN: [id number]  Admission Date: 11/14/2017  Hospital Length of Stay: 1 days  Discharge Date and Time:  11/15/2017 3:42 PM  Attending Physician: No att. providers found   Discharging Provider: Bridget Martinez MD  Primary Care Provider: Osbaldo Burgess NP      HPI:   32 year old female with uncontrolled diabetes mellitus type 2 and morbid obesity (BMI 39) who presented with one day of abdominal cramps and diarrhea. Pain is located in lower quadrants. Is crampy and intermittent, usually with PO intake. Has watery diarrhea. No mucous or blood. Denied nausea or vomiting, but rather indigestion. Also with myalgia throughout her body. Reported her son had same symptoms. In the ED, patient was afebrile and non toxic appearing. Physical exam benign and did not meet sepsis criteria. All labs WNL, except for hyperglycemia in the 200s. She received IVFs boluses and was to be sent home. Developed sinus tachycardia in the 120s, therefore admitted for further IV hydration and management. CT abdomen with contrast showed possible early jejunal enteritis, but motion artifact could be a factor. No lymphadenopathy noted.     * No surgery found *      Hospital Course:   Ms Fitch presented with a mild case of viral enteritis. Evaluated in the ED and was to be discharged but developed sinus tachycardia in the 120s despite 2L boluses of normal saline, therefore admitted for observation. Abdominal exam benign (even with deep palpation) and pain crampy, that was intermittent and not always present with meals. Abd CT with contrast (obtained in the ED) showed early jejunal enteritis vs motion artifact. Tachycardia resolved soon after admission. Tolerated full liquid diet. Had no nausea, vomiting or diarrhea. IVFs stopped and abx started in the ED discontinued. Patient's son had same symptoms just prior to hers. Myalgias also consistent with  viral etiology rather than bacteria. Is to advance diet as tolerated. Needs better control of diabetes. Reports of non compliance with metformin, which she only takes 500 mg daily. Needs to increase dose, but this is to be done gradually as outpatient once acute illness resolved as this can also cause diarrhea. Patient denied diarrhea being associated with metformin, as she has taken this for a long time. Activity as tolerated. F/u with PCP within the next 7 days.      Consults:     No new Assessment & Plan notes have been filed under this hospital service since the last note was generated.  Service: Hospital Medicine    Final Active Diagnoses:    Diagnosis Date Noted POA    PRINCIPAL PROBLEM:  Viral enteritis [A08.4] 11/14/2017 Yes    Abdominal cramping [R10.9] 11/15/2017 Yes    Diarrhea of infectious origin [A09] 11/15/2017 Yes    Uncontrolled type 2 diabetes mellitus with hyperglycemia, without long-term current use of insulin [E11.65] 11/15/2017 Yes    Obesity due to excess calories with serious comorbidity [E66.09] 11/15/2017 Yes    Vaginal candidiasis [B37.3] 11/15/2017 Yes    Hepatic steatosis [K76.0] 11/15/2017 Yes    Viral myalgia 11/15/2017 Yes      Problems Resolved During this Admission:    Diagnosis Date Noted Date Resolved POA    Sinus tachycardia [R00.0] 11/15/2017 11/15/2017 Yes    Orthostatic hypotension [I95.1] 11/15/2017 11/15/2017 Yes       Discharged Condition: good    Disposition: Home or Self Care    Follow Up:  Follow-up Information     Osbaldo Burgess NP. Schedule an appointment as soon as possible for a visit in 1 week.    Specialty:  Internal Medicine  Contact information:  [facility] [address] [address] [phone]                   Pending Diagnostic Studies:     None         Medications:  Reconciled Home Medications:   Discharge Medication List as of 11/15/2017  2:17 PM      CONTINUE these medications which have NOT CHANGED    Details   metformin (GLUCOPHAGE) 500 MG  tablet Take 500 mg by mouth once daily., Until Discontinued, Historical Med      ranitidine (ZANTAC) 300 MG tablet Take 1 tablet (300 mg total) by mouth nightly., Starting Mon 7/24/2017, Until Tue 7/24/2018, Print             Indwelling Lines/Drains at time of discharge:   Lines/Drains/Airways          No matching active lines, drains, or airways          Time spent on the discharge of patient: > 45 minutes  Patient was seen and examined on the date of discharge and determined to be suitable for discharge.         Bridget Martinez MD  Department of Hospital Medicine  [facility]

## 2017-11-15 NOTE — PLAN OF CARE
Patient discharged, no needs identified by patient.        11/15/17 1406   Discharge Assessment   Assessment Type Discharge Planning Assessment

## 2017-11-15 NOTE — PLAN OF CARE
Patient will schedule her own PCP follow-up appointment. SW informed nurse Tamara patient is clear to discharge from case management standpoint.       11/15/17 1409   Final Note   Assessment Type Final Discharge Note   Discharge Disposition Home   Hospital Follow Up  Appt(s) scheduled? No   Right Care Referral Info   Post Acute Recommendation No Care

## 2017-11-15 NOTE — NURSING
Discharge instructions given to patient both verbally and written.   Patient verbalized understanding of instructions.   Patient in stable condition.   Patient waiting on her ride home from a family member.

## 2017-11-20 PROBLEM — K52.9 ENTERITIS: Status: ACTIVE | Noted: 2017-11-20

## 2017-11-20 LAB
BACTERIA BLD CULT: NORMAL
BACTERIA BLD CULT: NORMAL

## 2018-01-29 ENCOUNTER — HOSPITAL ENCOUNTER (EMERGENCY)
Facility: HOSPITAL | Age: 33
Discharge: HOME | End: 2018-01-29
Attending: EMERGENCY MEDICINE
Payer: MEDICAID

## 2018-01-29 VITALS
HEIGHT: 64 IN | TEMPERATURE: 98 F | SYSTOLIC BLOOD PRESSURE: 122 MMHG | BODY MASS INDEX: 37.39 KG/M2 | WEIGHT: 219 LBS | OXYGEN SATURATION: 100 % | DIASTOLIC BLOOD PRESSURE: 77 MMHG | RESPIRATION RATE: 16 BRPM | HEART RATE: 90 BPM

## 2018-01-29 DIAGNOSIS — M79.644 PAIN OF RIGHT MIDDLE FINGER: ICD-10-CM

## 2018-01-29 DIAGNOSIS — J11.1 INFLUENZA-LIKE ILLNESS: Primary | ICD-10-CM

## 2018-01-29 DIAGNOSIS — B34.9 VIRAL SYNDROME: ICD-10-CM

## 2018-01-29 LAB
B-HCG UR QL: NEGATIVE
CTP QC/QA: YES
FLUAV AG SPEC QL IA: NEGATIVE
FLUBV AG SPEC QL IA: NEGATIVE
SPECIMEN SOURCE: NORMAL

## 2018-01-29 PROCEDURE — 87400 INFLUENZA A/B EACH AG IA: CPT

## 2018-01-29 PROCEDURE — 81025 URINE PREGNANCY TEST: CPT | Performed by: EMERGENCY MEDICINE

## 2018-01-29 PROCEDURE — 99284 EMERGENCY DEPT VISIT MOD MDM: CPT | Mod: 25

## 2018-01-29 RX ORDER — FLUTICASONE PROPIONATE 50 MCG
1 SPRAY, SUSPENSION (ML) NASAL 2 TIMES DAILY PRN
Qty: 15 G | Refills: 0 | Status: SHIPPED | OUTPATIENT
Start: 2018-01-29 | End: 2018-03-21

## 2018-01-29 RX ORDER — BENZONATATE 100 MG/1
100 CAPSULE ORAL 3 TIMES DAILY PRN
Qty: 20 CAPSULE | Refills: 0 | Status: SHIPPED | OUTPATIENT
Start: 2018-01-29 | End: 2018-02-08

## 2018-01-29 NOTE — ED PROVIDER NOTES
Encounter Date: 2018    SCRIBE #1 NOTE: I, Yuniel Tao , am scribing for, and in the presence of,  Emir Brooks NP . I have scribed the following portions of the note - Other sections scribed: HPI/ROS .       History     Chief Complaint   Patient presents with    flu like symptoms     Reports generalized body aches, cough, runny hose and headache since saturday. Denies fever     CC: Flu-like Symptoms     HPI: This 32 y.o. female with NIDDM presents to the ED c/o a 2-day hx of acute-onset chills, nasal congestion, rhinorrhea, sore throat, cough, and generalized body aches. She reports sick contact with her son who has similar symptoms and was recently dx with a viral illness. She notes that her son never had a fever. Additionally, pt reports continued pain to her R 3rd PIP joint after she inadvertently jammed her finger 3 weeks ago and requests an X-ray. She otherwise denies fever, chest pain, shortness of breath, numbness/weakness, swelling, bruising, and any other associated symptoms.       The history is provided by the patient. No  was used.     Review of patient's allergies indicates:  No Known Allergies  Past Medical History:   Diagnosis Date    Diabetes mellitus     Fibroid, uterine     GERD (gastroesophageal reflux disease)     Ovarian cyst      Past Surgical History:   Procedure Laterality Date     SECTION, CLASSIC      x 2    CYST REMOVAL      from both breast x 2     Family History   Problem Relation Age of Onset    Hypertension Mother     Diabetes Father     Sickle cell trait Brother     Diabetes Maternal Grandmother      Social History   Substance Use Topics    Smoking status: Never Smoker    Smokeless tobacco: Never Used    Alcohol use Yes      Comment: occasionally     Review of Systems   Constitutional: Positive for chills. Negative for appetite change and fever.   HENT: Positive for congestion, rhinorrhea and sore throat. Negative for ear  discharge, ear pain, sinus pain, sinus pressure and trouble swallowing.    Respiratory: Positive for cough. Negative for shortness of breath.    Cardiovascular: Negative for chest pain.   Gastrointestinal: Negative for abdominal pain, diarrhea, nausea and vomiting.   Musculoskeletal: Positive for arthralgias (R 3rd PIP joint) and myalgias (generalized body aches). Negative for back pain, joint swelling, neck pain and neck stiffness.   Skin: Negative for color change, rash and wound.   Neurological: Negative for headaches.       Physical Exam     Initial Vitals [01/29/18 1412]   BP Pulse Resp Temp SpO2   119/76 91 20 98.5 °F (36.9 °C) 98 %      MAP       90.33         Physical Exam    Nursing note and vitals reviewed.  Constitutional: She appears well-developed and well-nourished. She is not diaphoretic. No distress.   HENT:   Head: Normocephalic and atraumatic.   Right Ear: External ear normal.   Left Ear: External ear normal.   Nose: Rhinorrhea present. Right sinus exhibits no maxillary sinus tenderness and no frontal sinus tenderness. Left sinus exhibits no maxillary sinus tenderness and no frontal sinus tenderness.   Eyes: Conjunctivae and EOM are normal. Pupils are equal, round, and reactive to light. Right eye exhibits no discharge. Left eye exhibits no discharge. No scleral icterus.   Neck: Normal range of motion. Neck supple. No thyromegaly present. No tracheal deviation present. No JVD present.   Cardiovascular: Normal rate, regular rhythm, normal heart sounds and intact distal pulses. Exam reveals no gallop and no friction rub.    No murmur heard.  Pulmonary/Chest: Breath sounds normal. No stridor. No respiratory distress. She has no wheezes. She has no rhonchi. She has no rales.   Abdominal: Soft. Bowel sounds are normal. She exhibits no distension and no mass. There is no tenderness. There is no rebound and no guarding.   Musculoskeletal: Normal range of motion. She exhibits no edema or tenderness.    Lymphadenopathy:     She has no cervical adenopathy.   Neurological: She is alert and oriented to person, place, and time. She has normal strength and normal reflexes. She displays normal reflexes. No cranial nerve deficit or sensory deficit.   Skin: Skin is warm and dry. No rash and no abscess noted. No erythema. No pallor.   Psychiatric: She has a normal mood and affect. Her behavior is normal. Judgment and thought content normal.         ED Course   Procedures  Labs Reviewed   INFLUENZA A AND B ANTIGEN   POCT URINE PREGNANCY             Medical Decision Making:   Differential Diagnosis:   Includes influenza, URI, pharyngitis, otitis media, otitis externa, sinusitis, bronchitis, pneumonia, others  Clinical Tests:   Lab Tests: Ordered and Reviewed  Radiological Study: Ordered and Reviewed  ED Management:  31-year-old female presenting for evaluation of chills, sinus congestion, rhinorrhea, cough, sore throat, generalized myalgias, malaise, and other flulike symptoms. She denies that she has had any fevers. She denies chest pain, shortness of breath, abdominal pain, nausea, vomiting, otalgia, neck pain. She also reports that she injured her right third finger in the area of the PIP joint several weeks ago and states it is still hurts and she would like an x-ray. She is nontoxic, afebrile, well-appearing, and in no distress. Vital signs are within normal limits. Physical exam is unremarkable. No evidence of otitis or pharyngitis. Lung sounds are clear bilaterally in auscultation. Abdomen is soft and nontender without rigidity or guarding. There is mild tenderness in the area of the right hand third PIP joint without swelling, erythema, ecchymosis, bruising, deformity, or any other abnormalities. Influenza swab is negative. X-ray of the right hand is unremarkable. I suspect patient's symptoms are due to viral illness, most likely influenza. Prescribed medications for symptomatic treatment. Recommended follow-up with  her PCP. ED return precautions given. Patient expressed understanding of diagnosis and discharge instructions.  Other:   I have discussed this case with another health care provider.       <> Summary of the Discussion: Case discussed with my attending physician who agreed with the assessment and plan.            Scribe Attestation:   Scribe #1: I performed the above scribed service and the documentation accurately describes the services I performed. I attest to the accuracy of the note.    Attending Attestation:           Physician Attestation for Scribe:  Physician Attestation Statement for Scribe #1: I, Emir Brooks NP , reviewed documentation, as scribed by Yuniel Tao  in my presence, and it is both accurate and complete.                 ED Course      Clinical Impression:   The primary encounter diagnosis was Influenza-like illness. Diagnoses of Viral syndrome and Pain of right middle finger were also pertinent to this visit.    Disposition:   Disposition: Discharged  Condition: Stable                        Emir Brooks NP  01/29/18 3070

## 2018-01-29 NOTE — DISCHARGE INSTRUCTIONS
Use cough medication as needed and only as prescribed.    If you develop a fever you can use can use ibuprofen and Tylenol to treat it.    Follow-up with your primary physician for further evaluation and management.    Return to the emergency department for any new or worsening symptoms or as needed.

## 2018-01-29 NOTE — ED TRIAGE NOTES
C/o chills, body aches, stuffy nose, cough since Saturday. R middle finger jammed 3 weeks ago and still swollen.

## 2018-03-21 ENCOUNTER — HOSPITAL ENCOUNTER (EMERGENCY)
Facility: HOSPITAL | Age: 33
Discharge: HOME | End: 2018-03-21
Attending: EMERGENCY MEDICINE
Payer: MEDICAID

## 2018-03-21 VITALS
HEIGHT: 63 IN | DIASTOLIC BLOOD PRESSURE: 66 MMHG | SYSTOLIC BLOOD PRESSURE: 112 MMHG | RESPIRATION RATE: 18 BRPM | OXYGEN SATURATION: 98 % | WEIGHT: 223 LBS | HEART RATE: 86 BPM | TEMPERATURE: 99 F | BODY MASS INDEX: 39.51 KG/M2

## 2018-03-21 DIAGNOSIS — R51.9 NONINTRACTABLE HEADACHE, UNSPECIFIED CHRONICITY PATTERN, UNSPECIFIED HEADACHE TYPE: Primary | ICD-10-CM

## 2018-03-21 DIAGNOSIS — R42 LIGHTHEADEDNESS: ICD-10-CM

## 2018-03-21 LAB
ANION GAP SERPL CALC-SCNC: 8 MMOL/L
B-HCG UR QL: NEGATIVE
BACTERIA #/AREA URNS HPF: ABNORMAL /HPF
BASOPHILS # BLD AUTO: 0.01 K/UL
BASOPHILS NFR BLD: 0.1 %
BILIRUB UR QL STRIP: NEGATIVE
BUN SERPL-MCNC: 11 MG/DL
CALCIUM SERPL-MCNC: 9 MG/DL
CHLORIDE SERPL-SCNC: 103 MMOL/L
CLARITY UR: CLEAR
CO2 SERPL-SCNC: 24 MMOL/L
COLOR UR: YELLOW
CREAT SERPL-MCNC: 0.8 MG/DL
CTP QC/QA: YES
DIFFERENTIAL METHOD: NORMAL
EOSINOPHIL # BLD AUTO: 0.1 K/UL
EOSINOPHIL NFR BLD: 1 %
ERYTHROCYTE [DISTWIDTH] IN BLOOD BY AUTOMATED COUNT: 12 %
EST. GFR  (AFRICAN AMERICAN): >60 ML/MIN/1.73 M^2
EST. GFR  (NON AFRICAN AMERICAN): >60 ML/MIN/1.73 M^2
GLUCOSE SERPL-MCNC: 311 MG/DL
GLUCOSE UR QL STRIP: ABNORMAL
HCT VFR BLD AUTO: 39 %
HGB BLD-MCNC: 12.9 G/DL
HGB UR QL STRIP: NEGATIVE
KETONES UR QL STRIP: NEGATIVE
LEUKOCYTE ESTERASE UR QL STRIP: ABNORMAL
LYMPHOCYTES # BLD AUTO: 3 K/UL
LYMPHOCYTES NFR BLD: 38.4 %
MCH RBC QN AUTO: 28.4 PG
MCHC RBC AUTO-ENTMCNC: 33.1 G/DL
MCV RBC AUTO: 86 FL
MICROSCOPIC COMMENT: ABNORMAL
MONOCYTES # BLD AUTO: 0.5 K/UL
MONOCYTES NFR BLD: 6 %
NEUTROPHILS # BLD AUTO: 4.2 K/UL
NEUTROPHILS NFR BLD: 54.2 %
NITRITE UR QL STRIP: NEGATIVE
PH UR STRIP: 5 [PH] (ref 5–8)
PLATELET # BLD AUTO: 221 K/UL
PMV BLD AUTO: 10.2 FL
POCT GLUCOSE: 215 MG/DL (ref 70–110)
POCT GLUCOSE: 318 MG/DL (ref 70–110)
POTASSIUM SERPL-SCNC: 4.1 MMOL/L
PROT UR QL STRIP: NEGATIVE
RBC # BLD AUTO: 4.55 M/UL
RBC #/AREA URNS HPF: 2 /HPF (ref 0–4)
SODIUM SERPL-SCNC: 135 MMOL/L
SP GR UR STRIP: >1.03 (ref 1–1.03)
SQUAMOUS #/AREA URNS HPF: 6 /HPF
URN SPEC COLLECT METH UR: ABNORMAL
UROBILINOGEN UR STRIP-ACNC: NEGATIVE EU/DL
WBC # BLD AUTO: 7.77 K/UL
WBC #/AREA URNS HPF: 4 /HPF (ref 0–5)
YEAST URNS QL MICRO: ABNORMAL

## 2018-03-21 PROCEDURE — 96374 THER/PROPH/DIAG INJ IV PUSH: CPT

## 2018-03-21 PROCEDURE — 85025 COMPLETE CBC W/AUTO DIFF WBC: CPT

## 2018-03-21 PROCEDURE — 80048 BASIC METABOLIC PNL TOTAL CA: CPT

## 2018-03-21 PROCEDURE — 93005 ELECTROCARDIOGRAM TRACING: CPT

## 2018-03-21 PROCEDURE — 25000003 PHARM REV CODE 250: Performed by: PHYSICIAN ASSISTANT

## 2018-03-21 PROCEDURE — 99284 EMERGENCY DEPT VISIT MOD MDM: CPT | Mod: 25

## 2018-03-21 PROCEDURE — 82962 GLUCOSE BLOOD TEST: CPT

## 2018-03-21 PROCEDURE — 87086 URINE CULTURE/COLONY COUNT: CPT

## 2018-03-21 PROCEDURE — 93010 ELECTROCARDIOGRAM REPORT: CPT | Mod: ,,, | Performed by: INTERNAL MEDICINE

## 2018-03-21 PROCEDURE — 63600175 PHARM REV CODE 636 W HCPCS: Performed by: PHYSICIAN ASSISTANT

## 2018-03-21 PROCEDURE — 81000 URINALYSIS NONAUTO W/SCOPE: CPT

## 2018-03-21 PROCEDURE — 81025 URINE PREGNANCY TEST: CPT | Performed by: EMERGENCY MEDICINE

## 2018-03-21 RX ORDER — KETOROLAC TROMETHAMINE 30 MG/ML
15 INJECTION, SOLUTION INTRAMUSCULAR; INTRAVENOUS
Status: COMPLETED | OUTPATIENT
Start: 2018-03-21 | End: 2018-03-21

## 2018-03-21 RX ADMIN — KETOROLAC TROMETHAMINE 15 MG: 30 INJECTION, SOLUTION INTRAMUSCULAR; INTRAVENOUS at 02:03

## 2018-03-21 RX ADMIN — SODIUM CHLORIDE 1000 ML: 0.9 INJECTION, SOLUTION INTRAVENOUS at 03:03

## 2018-03-21 RX ADMIN — SODIUM CHLORIDE 1000 ML: 0.9 INJECTION, SOLUTION INTRAVENOUS at 02:03

## 2018-03-21 NOTE — ED NOTES
PER PROVIDER MARIAN, DO NOT GIVE INSULIN THAT WAS ORDERED, COMPLETE 2 LITER FLUID BOLUS AND RECHECK BLOOD SUGAR. REPORT TO PROVIDER AND REASSESS ADMINISTRATION OF IV INSULIN.

## 2018-03-21 NOTE — ED TRIAGE NOTES
PT REPORTS BODYACHES, HEADACHE, FATIGUE SINCE LAST NIGHT AND DIARRHEA FOR 2 DAYS. PT DENIES CHEST PAIN, SOB, FEVERS, COUGH, NAUSEA, VOMITING. PT STATES SHE THINKS SHE ALSO HAS A YEAST INFECTION.

## 2018-03-21 NOTE — ED PROVIDER NOTES
"Encounter Date: 3/21/2018       History     Chief Complaint   Patient presents with    Generalized Body Aches     " i have body ache and headaches" symptoms since this am     32-year-old female, past medical history DM type II, uterine fibroids, GERD, ovarian cyst, presents to ED complaining of atraumatic global headache, body aches, all since this morning.  Patient states she does express headaches from time to time, typically relieved with ibuprofen.  No relief with NSAIDs today.  She denies trauma.  She denies visual disturbance, denies nausea or emesis.  She does admit to intermittent lightheadedness during headaches. No syncope or near syncope.  No fever or chills.  No recent illness.  Denies cough. No abdominal pain. Symptoms constant.  No alleviating or exacerbating factors.  No radiation of pain.  Character of headache is constant aching sensation.  No tinnitus.  No vertigo.  She denies radiculopathy or paresthesia.          Review of patient's allergies indicates:  No Known Allergies  Past Medical History:   Diagnosis Date    Diabetes mellitus     Fibroid, uterine     GERD (gastroesophageal reflux disease)     Ovarian cyst      Past Surgical History:   Procedure Laterality Date     SECTION, CLASSIC      x 2    CYST REMOVAL      from both breast x 2     Family History   Problem Relation Age of Onset    Hypertension Mother     Diabetes Father     Sickle cell trait Brother     Diabetes Maternal Grandmother      Social History   Substance Use Topics    Smoking status: Never Smoker    Smokeless tobacco: Never Used    Alcohol use Yes      Comment: occasionally     Review of Systems   Constitutional: Negative for fever.   HENT: Negative for sore throat.    Eyes: Negative.    Respiratory: Negative for shortness of breath.    Cardiovascular: Negative for chest pain.   Gastrointestinal: Negative for nausea and vomiting.   Endocrine: Negative.    Genitourinary: Negative for dysuria. "   Musculoskeletal: Positive for myalgias. Negative for back pain, neck pain and neck stiffness.   Skin: Negative for rash.   Neurological: Positive for light-headedness and headaches. Negative for dizziness, weakness and numbness.   Hematological: Does not bruise/bleed easily.   Psychiatric/Behavioral: Negative.    All other systems reviewed and are negative.      Physical Exam     Initial Vitals [03/21/18 1329]   BP Pulse Resp Temp SpO2   120/62 92 20 98.7 °F (37.1 °C) 98 %      MAP       81.33         Physical Exam    Nursing note and vitals reviewed.  Constitutional: She appears well-developed and well-nourished. She is not diaphoretic. No distress.   HENT:   Head: Normocephalic and atraumatic.   Eyes: Conjunctivae and EOM are normal. Pupils are equal, round, and reactive to light.   Neck: Normal range of motion. Neck supple. No tracheal deviation present.   Cardiovascular: Normal heart sounds.   Pulmonary/Chest: Breath sounds normal. No stridor. No respiratory distress. She has no wheezes.   Abdominal: Soft. Bowel sounds are normal. She exhibits no distension. There is no tenderness.   Musculoskeletal: Normal range of motion. She exhibits no tenderness.   Lymphadenopathy:     She has no cervical adenopathy.   Neurological: She is alert and oriented to person, place, and time.   Skin: Skin is warm and dry. Capillary refill takes less than 2 seconds.   Psychiatric: She has a normal mood and affect. Her behavior is normal. Judgment and thought content normal.         ED Course   Procedures  Labs Reviewed   URINALYSIS - Abnormal; Notable for the following:        Result Value    Specific Gravity, UA >1.030 (*)     Glucose, UA 3+ (*)     Leukocytes, UA Trace (*)     All other components within normal limits   BASIC METABOLIC PANEL - Abnormal; Notable for the following:     Sodium 135 (*)     Glucose 311 (*)     All other components within normal limits   URINALYSIS MICROSCOPIC - Abnormal; Notable for the following:      Bacteria, UA Moderate (*)     All other components within normal limits   POCT GLUCOSE - Abnormal; Notable for the following:     POCT Glucose 318 (*)     All other components within normal limits   POCT GLUCOSE - Abnormal; Notable for the following:     POCT Glucose 215 (*)     All other components within normal limits   CULTURE, URINE   CBC W/ AUTO DIFFERENTIAL   POCT URINE PREGNANCY     EKG Readings: (Independently Interpreted)   Normal sinus rhythm, ventricular rate 81bpm. Normal IN interval. Normal QT interval. No arrhythmia, no st elevation.          Medical Decision Making:   ED Management:  32-year-old female presents to ED complaining of generalized body aches, global headache, all since this morning.  She denies trauma.  She denies visual disturbance, denies nausea or emesis.  She does admit to history of headaches, typically relieved with over-the-counter medications.  No radiculopathy or paresthesia.  No neck pain or stiffness.  No recent illness.  Differential at this time includes headache disorder unspecified, migraine, dehydration.  Patient is type II diabetic.  She states controlled with metformin only.  She does not check her blood sugar regularly.  She denies syncope or near-syncope, however does admit to intermittent bouts of lightheadedness associated with headache.  Again, no history of trauma, no history of cerebral aneurysms within the family.  On exam, she is well-appearing and nontoxic.  Her vitals are reassuring.  She is overall neurologically intact without focal deficit.  She is not orthostatic.  EKG negative for life-threatening arrhythmia or evidence of ischemia.  On reevaluation, patient states she does feel better after fluids.  CBC without leukocytosis or anemia.  CMP without significant electrolyte abnormality. Urinalysis without evidence of infection. I do think this presentation is most consistent with dehydration or exhaustion.  Patient is working 3 jobs at this time.  I do  think she needs some good rest, proper hydration.  No evidence of acute life-threatening illness.  I've asked patient to keep an eye on her glucose, to schedule follow-up appointment with her primary.  She does understand and agree with treatment plan. She is sleeping in bed on reevaluation. I do feel she is safe and stable for discharge without further intervention.  Patient does understand and agree.  Return precautions given.  Other:   I have discussed this case with another health care provider.       <> Summary of the Discussion: I have discussed this case with .              Attending Attestation:     Physician Attestation Statement for NP/PA:   I discussed this assessment and plan of this patient with the NP/PA, but I did not personally examine the patient. The face to face encounter was performed by the NP/PA.                     Clinical Impression:   The primary encounter diagnosis was Nonintractable headache, unspecified chronicity pattern, unspecified headache type. A diagnosis of Lightheadedness was also pertinent to this visit.    Disposition:   Disposition: Discharged  Condition: Stable                        William Sotelo PA-C  03/21/18 2010

## 2018-03-21 NOTE — DISCHARGE INSTRUCTIONS
Stay well hydrated. Continue with healthy diet choices. Get some good rest. OTC ibuprofen or tylenol as needed for headache.    Follow-up with your primary care physician for reevaluation and further recommendations. Keep a blood sugar diary, remember to take your metformin. Return to this ED if headache recurs, if symptoms persist despite treatment, if any other problems occur.

## 2018-03-22 ENCOUNTER — HOSPITAL ENCOUNTER (EMERGENCY)
Facility: HOSPITAL | Age: 33
Discharge: HOME | End: 2018-03-23
Attending: EMERGENCY MEDICINE
Payer: MEDICAID

## 2018-03-22 DIAGNOSIS — R19.7 DIARRHEA, UNSPECIFIED TYPE: Primary | ICD-10-CM

## 2018-03-22 DIAGNOSIS — R73.9 HYPERGLYCEMIA: ICD-10-CM

## 2018-03-22 LAB
ALBUMIN SERPL BCP-MCNC: 3.3 G/DL
ALP SERPL-CCNC: 113 U/L
ALT SERPL W/O P-5'-P-CCNC: 15 U/L
ANION GAP SERPL CALC-SCNC: 9 MMOL/L
AST SERPL-CCNC: 12 U/L
B-HCG UR QL: NEGATIVE
BILIRUB SERPL-MCNC: 0.2 MG/DL
BUN SERPL-MCNC: 10 MG/DL
CALCIUM SERPL-MCNC: 9 MG/DL
CHLORIDE SERPL-SCNC: 104 MMOL/L
CO2 SERPL-SCNC: 21 MMOL/L
CREAT SERPL-MCNC: 0.8 MG/DL
CTP QC/QA: YES
EST. GFR  (AFRICAN AMERICAN): >60 ML/MIN/1.73 M^2
EST. GFR  (NON AFRICAN AMERICAN): >60 ML/MIN/1.73 M^2
GLUCOSE SERPL-MCNC: 233 MG/DL
LIPASE SERPL-CCNC: 55 U/L
POCT GLUCOSE: 211 MG/DL (ref 70–110)
POTASSIUM SERPL-SCNC: 3.9 MMOL/L
PROT SERPL-MCNC: 7.3 G/DL
SODIUM SERPL-SCNC: 134 MMOL/L

## 2018-03-22 PROCEDURE — 80053 COMPREHEN METABOLIC PANEL: CPT

## 2018-03-22 PROCEDURE — 99284 EMERGENCY DEPT VISIT MOD MDM: CPT | Mod: 25

## 2018-03-22 PROCEDURE — 87491 CHLMYD TRACH DNA AMP PROBE: CPT

## 2018-03-22 PROCEDURE — 82962 GLUCOSE BLOOD TEST: CPT

## 2018-03-22 PROCEDURE — 85025 COMPLETE CBC W/AUTO DIFF WBC: CPT

## 2018-03-22 PROCEDURE — 81025 URINE PREGNANCY TEST: CPT | Performed by: NURSE PRACTITIONER

## 2018-03-22 PROCEDURE — 87210 SMEAR WET MOUNT SALINE/INK: CPT

## 2018-03-22 PROCEDURE — 81000 URINALYSIS NONAUTO W/SCOPE: CPT

## 2018-03-22 PROCEDURE — 83690 ASSAY OF LIPASE: CPT

## 2018-03-23 VITALS
OXYGEN SATURATION: 97 % | SYSTOLIC BLOOD PRESSURE: 119 MMHG | RESPIRATION RATE: 18 BRPM | BODY MASS INDEX: 38.98 KG/M2 | HEART RATE: 77 BPM | TEMPERATURE: 98 F | WEIGHT: 220 LBS | DIASTOLIC BLOOD PRESSURE: 72 MMHG | HEIGHT: 63 IN

## 2018-03-23 LAB
BACTERIA #/AREA URNS HPF: NORMAL /HPF
BACTERIA GENITAL QL WET PREP: ABNORMAL
BACTERIA UR CULT: NORMAL
BASOPHILS # BLD AUTO: 0.02 K/UL
BASOPHILS NFR BLD: 0.2 %
BILIRUB UR QL STRIP: NEGATIVE
C TRACH DNA SPEC QL NAA+PROBE: NOT DETECTED
CAOX CRY URNS QL MICRO: NORMAL
CLARITY UR: CLEAR
CLUE CELLS VAG QL WET PREP: ABNORMAL
COLOR UR: YELLOW
DIFFERENTIAL METHOD: NORMAL
EOSINOPHIL # BLD AUTO: 0.1 K/UL
EOSINOPHIL NFR BLD: 1.2 %
ERYTHROCYTE [DISTWIDTH] IN BLOOD BY AUTOMATED COUNT: 12.1 %
FILAMENT FUNGI VAG WET PREP-#/AREA: ABNORMAL
GLUCOSE UR QL STRIP: ABNORMAL
HCT VFR BLD AUTO: 38.9 %
HGB BLD-MCNC: 13.1 G/DL
HGB UR QL STRIP: NEGATIVE
KETONES UR QL STRIP: NEGATIVE
LEUKOCYTE ESTERASE UR QL STRIP: ABNORMAL
LYMPHOCYTES # BLD AUTO: 4 K/UL
LYMPHOCYTES NFR BLD: 47.2 %
MCH RBC QN AUTO: 28.8 PG
MCHC RBC AUTO-ENTMCNC: 33.7 G/DL
MCV RBC AUTO: 86 FL
MICROSCOPIC COMMENT: NORMAL
MONOCYTES # BLD AUTO: 0.4 K/UL
MONOCYTES NFR BLD: 5 %
N GONORRHOEA DNA SPEC QL NAA+PROBE: NOT DETECTED
NEUTROPHILS # BLD AUTO: 3.9 K/UL
NEUTROPHILS NFR BLD: 46.2 %
NITRITE UR QL STRIP: NEGATIVE
PH UR STRIP: 5 [PH] (ref 5–8)
PLATELET # BLD AUTO: 251 K/UL
PMV BLD AUTO: 10.9 FL
PROT UR QL STRIP: NEGATIVE
RBC # BLD AUTO: 4.55 M/UL
RBC #/AREA URNS HPF: 0 /HPF (ref 0–4)
SP GR UR STRIP: >1.03 (ref 1–1.03)
SPECIMEN SOURCE: ABNORMAL
SQUAMOUS #/AREA URNS HPF: NORMAL /HPF
T VAGINALIS GENITAL QL WET PREP: ABNORMAL
URN SPEC COLLECT METH UR: ABNORMAL
UROBILINOGEN UR STRIP-ACNC: NEGATIVE EU/DL
WBC # BLD AUTO: 8.39 K/UL
WBC #/AREA URNS HPF: 2 /HPF (ref 0–5)
WBC #/AREA VAG WET PREP: ABNORMAL
YEAST GENITAL QL WET PREP: ABNORMAL
YEAST URNS QL MICRO: NORMAL

## 2018-03-23 PROCEDURE — 63600175 PHARM REV CODE 636 W HCPCS: Performed by: NURSE PRACTITIONER

## 2018-03-23 RX ORDER — HYOSCYAMINE SULFATE 0.12 MG/1
0.12 TABLET SUBLINGUAL EVERY 4 HOURS PRN
Qty: 42 TABLET | Refills: 0 | Status: SHIPPED | OUTPATIENT
Start: 2018-03-23 | End: 2018-03-28

## 2018-03-23 RX ORDER — HYOSCYAMINE SULFATE 0.12 MG/1
0.12 TABLET SUBLINGUAL
Status: COMPLETED | OUTPATIENT
Start: 2018-03-23 | End: 2018-03-23

## 2018-03-23 RX ADMIN — HYOSCYAMINE SULFATE 0.12 MG: 0.12 TABLET ORAL; SUBLINGUAL at 01:03

## 2018-03-23 NOTE — ED PROVIDER NOTES
"Encounter Date: 3/22/2018       History     Chief Complaint   Patient presents with    Hyperglycemia     Pt reports she is unable to control her blood sugar.  States she was here yesterday (glucose was 200 when she left yesterday, 365 this am, 332 at 1600 and 265 at 1900.  Pt also c/o abdominal pain and being "lightheaded".      Abdominal Pain     Chief complaint: Abdominal pain    History of present illness: Patient's a 32-year-old female who reports 4 days of diarrhea, generalized abdominal pain is constant and cramping.  She reports she's taken no medications or treatments for this.  She was seen here yesterday and given 2 L of normal saline for dehydration.  She reports decreased appetite since lunch at noon.  Reports chills, diarrhea that has been copious, fatigue and clear vaginal discharge for which she believes she had a yeast infection and use Monistat without resolution.      The history is provided by the patient. No  was used.     Review of patient's allergies indicates:  No Known Allergies  Past Medical History:   Diagnosis Date    Diabetes mellitus     Fibroid, uterine     GERD (gastroesophageal reflux disease)     Ovarian cyst      Past Surgical History:   Procedure Laterality Date     SECTION, CLASSIC      x 2    CYST REMOVAL      from both breast x 2     Family History   Problem Relation Age of Onset    Hypertension Mother     Diabetes Father     Sickle cell trait Brother     Diabetes Maternal Grandmother      Social History   Substance Use Topics    Smoking status: Never Smoker    Smokeless tobacco: Never Used    Alcohol use Yes      Comment: occasionally     Review of Systems   Constitutional: Positive for appetite change, chills and fatigue. Negative for fever.   HENT: Negative for congestion, ear discharge, ear pain, postnasal drip, rhinorrhea, sinus pressure, sneezing, sore throat and voice change.    Eyes: Negative for discharge and itching. "   Respiratory: Negative for cough, shortness of breath and wheezing.    Cardiovascular: Negative for chest pain, palpitations and leg swelling.   Gastrointestinal: Positive for abdominal pain and diarrhea. Negative for constipation, nausea and vomiting.   Endocrine: Negative for polydipsia, polyphagia and polyuria.   Genitourinary: Negative for dysuria, frequency, hematuria, urgency, vaginal bleeding, vaginal discharge and vaginal pain.   Musculoskeletal: Negative for arthralgias and myalgias.   Skin: Negative for rash and wound.   Neurological: Negative for dizziness, seizures, syncope, weakness and numbness.   Hematological: Negative for adenopathy. Does not bruise/bleed easily.   Psychiatric/Behavioral: Negative for self-injury and suicidal ideas. The patient is not nervous/anxious.        Physical Exam     Initial Vitals [03/22/18 1951]   BP Pulse Resp Temp SpO2   127/75 78 16 98.4 °F (36.9 °C) 99 %      MAP       92.33         Physical Exam    Nursing note and vitals reviewed.  Constitutional: She appears well-developed and well-nourished.   HENT:   Head: Normocephalic and atraumatic.   Right Ear: External ear normal.   Left Ear: External ear normal.   Nose: Nose normal. No epistaxis.   Mouth/Throat: Oropharynx is clear and moist.   Eyes: Conjunctivae and EOM are normal. Pupils are equal, round, and reactive to light. Right eye exhibits no discharge. Left eye exhibits no discharge.   Neck: Normal range of motion.   Cardiovascular: Regular rhythm, S1 normal, S2 normal and normal heart sounds. Exam reveals no gallop.    No murmur heard.  Pulmonary/Chest: Effort normal and breath sounds normal. No respiratory distress. She has no decreased breath sounds. She has no wheezes. She has no rhonchi. She has no rales.   Abdominal: Soft. Normal appearance and bowel sounds are normal. She exhibits no distension. There is no tenderness. Hernia confirmed negative in the right inguinal area and confirmed negative in the left  inguinal area.   Genitourinary: Vagina normal and uterus normal. Pelvic exam was performed with patient prone. No labial fusion. There is no rash, tenderness, lesion or injury on the right labia. There is no rash, tenderness, lesion or injury on the left labia. Uterus is not deviated, not enlarged, not fixed and not tender. Cervix exhibits no motion tenderness, no discharge and no friability. Right adnexum displays no mass, no tenderness and no fullness. Left adnexum displays no mass, no tenderness and no fullness. No erythema, tenderness or bleeding in the vagina. No foreign body in the vagina. No signs of injury around the vagina. No vaginal discharge found.   Musculoskeletal: Normal range of motion.   Lymphadenopathy:        Right: No inguinal adenopathy present.        Left: No inguinal adenopathy present.   Neurological: She is alert and oriented to person, place, and time.   Skin: Skin is dry. Capillary refill takes less than 2 seconds.         ED Course   Procedures  Labs Reviewed   POCT GLUCOSE - Abnormal; Notable for the following:        Result Value    POCT Glucose 211 (*)     All other components within normal limits   C. TRACHOMATIS/N. GONORRHOEAE BY AMP DNA   VAGINAL SCREEN   CBC W/ AUTO DIFFERENTIAL   COMPREHENSIVE METABOLIC PANEL   LIPASE   URINALYSIS   POCT URINE PREGNANCY             Medical Decision Making:   Initial Assessment:   Pt is a 32 y.o. year old female with a history of gen abd cramping with diarrhea, clear vag d/c.    Differential Diagnosis:   Differential diagnoses include but are not limited to infectious diarrhea, IBD, medication reaction, std, uti.  ED Management:  Following a thorough history and focused assessment, blood and urine were sent for analysis.  A GYN exam was performed with collection of swabs.    Lab and radiology results, if performed, are interpreted below.  Patient was given Levsin 0.125 mg for crampy abdominal pain.    D/C medications and instructions included:  Levsin 0.125 mg sublingual every 4 hours when necessary crampy abdominal pain, high fiber diet to slow diarrhea.  GC chlamydia culture pending at time of discharge.  Follow-up with primary care provider ASAP.  Return for any worsening or increase in severity of symptoms.  Patient understands to continue to take her metformin and follow up with her PCP also for management of diabetes.  Care of the patient discussed with Dr. Willoughby who agreed with the assessment and plan.                Attending Attestation:     Physician Attestation Statement for NP/PA:   I discussed this assessment and plan of this patient with the NP/PA, but I did not personally examine the patient. The face to face encounter was performed by the NP/PA.                  ED Course as of Mar 23 0442   Thu Mar 22, 2018   2330 Preg Test, Ur: Negative [VC]   2357 The chemistry was essentially negative for hypo-or hyper natremia, kalemia, chloridemia, or other electrolyte abnormalities; BUN and creatinine were within normal limits indicating normal kidney function, ALT and AST were within normal limits indicating normal liver function, there was no transaminitis.      [VC]   2357 Lipase: 55 [VC]   Fri Mar 23, 2018   0005 CBC: leukocyte count was normal, the H&H was normal. The platelet count was normal.      [VC]   0044 Awaiting UA, vag screen.  GC will be pending at time of disposition.  [VC]   0136 UA demonstrates no infection per microscopic.  [VC]   0136 Vaginal screen is negative for tricomonas, clue cells (indicating no bv), yeast, or excess bacteria    [VC]      ED Course User Index  [VC] Channing Shen DNP     Clinical Impression:   The primary encounter diagnosis was Diarrhea, unspecified type. A diagnosis of Hyperglycemia was also pertinent to this visit.    Disposition:   Disposition: Discharged  Condition: Stable                        Channing Shen DNP  03/23/18 0445       Channing Shen DNP  03/23/18 0446       Mohsen OSMAN  MD Fartun  04/11/18 0811

## 2018-03-23 NOTE — DISCHARGE INSTRUCTIONS
Eat a high fiber diet as ordered to decrease frequency of diarrhea.  See your pcp for routine diabetes control.  Return to the Emergency department for any worsening or failure to improve, otherwise follow up with your primary care provider.

## 2018-03-23 NOTE — PROGRESS NOTES
This is an initial triage evaluation of Breanna Fitch, a 32 y.o., afebrile, nontoxic, and well appearing female that presents to the Emergency Department with c/o lightheadedness and hyperglycemia. Pertinent exam findings include A&O, no distress, , VSS. Eval here yesterday for same complaint.     Orders Pending : none.    Destination: [facility]. All beds are presently full and the patient was notified of the current status. I have evaluated and provided a medical screening exam with initial orders placed, if indicated, to expedite care. The patient is stable to return to the waiting area and will be placed in a bed when one is available. Care will be transferred to an alternate provider when patient was placed in an exam room from the [facility] for full assessment including: history, physical exam, additional orders, and final disposition.  7:57 PM. ODILIA Sanchez PA-C

## 2018-03-23 NOTE — ED NOTES
Patient presented to the ED stating that she was here last night due to the control of her Bs. Patient stated feeling weak and dizzy. Patient stated taking metformin. Patient stated having diarrhea for 4 days.

## 2018-03-28 ENCOUNTER — HOSPITAL ENCOUNTER (EMERGENCY)
Facility: HOSPITAL | Age: 33
Discharge: HOME | End: 2018-03-28
Attending: EMERGENCY MEDICINE
Payer: MEDICAID

## 2018-03-28 VITALS
SYSTOLIC BLOOD PRESSURE: 119 MMHG | TEMPERATURE: 99 F | OXYGEN SATURATION: 96 % | HEIGHT: 63 IN | HEART RATE: 86 BPM | WEIGHT: 220 LBS | BODY MASS INDEX: 38.98 KG/M2 | RESPIRATION RATE: 18 BRPM | DIASTOLIC BLOOD PRESSURE: 67 MMHG

## 2018-03-28 DIAGNOSIS — R51.9 NONINTRACTABLE HEADACHE, UNSPECIFIED CHRONICITY PATTERN, UNSPECIFIED HEADACHE TYPE: Primary | ICD-10-CM

## 2018-03-28 DIAGNOSIS — R42 DIZZINESS: ICD-10-CM

## 2018-03-28 LAB — GLUCOSE SERPL-MCNC: 245 MG/DL (ref 70–110)

## 2018-03-28 PROCEDURE — 82962 GLUCOSE BLOOD TEST: CPT

## 2018-03-28 PROCEDURE — 93010 ELECTROCARDIOGRAM REPORT: CPT | Mod: ,,, | Performed by: INTERNAL MEDICINE

## 2018-03-28 PROCEDURE — 99284 EMERGENCY DEPT VISIT MOD MDM: CPT | Mod: 25

## 2018-03-28 PROCEDURE — 93005 ELECTROCARDIOGRAM TRACING: CPT

## 2018-03-28 NOTE — DISCHARGE INSTRUCTIONS
Follow up with your primary care provider.  Return to the Emergency department for any worsening or failure to improve, otherwise follow up with your primary care provider.

## 2018-03-28 NOTE — ED PROVIDER NOTES
"Encounter Date: 3/28/2018       History     Chief Complaint   Patient presents with    Diarrhea     "I almost passed out at work. I just got so dizzy. I checked my sugar. It was high 331." reports compliant with meds; reports "a little dizzy" at the moment; reports diarrhea x 2 days; denies cardiac hx    Dizziness     HPI  Review of patient's allergies indicates:  No Known Allergies  Past Medical History:   Diagnosis Date    Diabetes mellitus     Fibroid, uterine     GERD (gastroesophageal reflux disease)     Ovarian cyst      Past Surgical History:   Procedure Laterality Date     SECTION, CLASSIC      x 2    CYST REMOVAL      from both breast x 2     Family History   Problem Relation Age of Onset    Hypertension Mother     Diabetes Father     Sickle cell trait Brother     Diabetes Maternal Grandmother      Social History   Substance Use Topics    Smoking status: Never Smoker    Smokeless tobacco: Never Used    Alcohol use Yes      Comment: occasionally     Review of Systems    Physical Exam     Initial Vitals [18 1219]   BP Pulse Resp Temp SpO2   113/66 82 20 98.5 °F (36.9 °C) 97 %      MAP       81.67         Physical Exam    ED Course   Procedures  Labs Reviewed - No data to display          Medical Decision Making:   ED Management:  The patient reports a slight vibration sensation across her forehead twice today.  She has no headache or any focal neurological deficits.  Here her blood sugar is 240.  She has diabetes and is on metformin twice a day.  She has no acute complaints at this time.  Patient blood sugars less than 250.  Very UNLIkely DKA.  She has no focal neurological deficits or head pain at all.  She is only concerned about the slight vibration and she felt across her forehead this morning.  She is very concerned about her diabetes.  We had discussion about exercise and eating healthy.  She has an appointment with her doctor next week as far as her blood sugar control.  " She reports she came to ER for tighter blood sugar control.  She understands this is done as an outpatient.  No complaints of headache dizziness or near syncope to me.                      Clinical Impression:   {Add your Clinical Impression here. If you haven't documented one yet, please pend the note, finalize a Clinical Impression, and refresh your note before signing.:[id number]}

## 2018-03-28 NOTE — ED PROVIDER NOTES
"Encounter Date: 3/28/2018       History     Chief Complaint   Patient presents with    Diarrhea     "I almost passed out at work. I just got so dizzy. I checked my sugar. It was high 331." reports compliant with meds; reports "a little dizzy" at the moment; reports diarrhea x 2 days; denies cardiac hx    Dizziness     Chief complaint: Diarrhea and dizziness    History of present illness: Patient is a 32-year-old female who reports that she's had diarrhea secondary to her use of metformin for her diabetes mellitus.  CBG was 245 on arrival.  She reports this morning she felt a sudden vibration in her head that occurred twice prior to the onset of dizziness. She reports a mild headache at this time.  Severity of pain is 0/10.  She denies all other symptoms.      The history is provided by the patient. No  was used.     Review of patient's allergies indicates:  No Known Allergies  Past Medical History:   Diagnosis Date    Diabetes mellitus     Fibroid, uterine     GERD (gastroesophageal reflux disease)     Ovarian cyst      Past Surgical History:   Procedure Laterality Date     SECTION, CLASSIC      x 2    CYST REMOVAL      from both breast x 2     Family History   Problem Relation Age of Onset    Hypertension Mother     Diabetes Father     Sickle cell trait Brother     Diabetes Maternal Grandmother      Social History   Substance Use Topics    Smoking status: Never Smoker    Smokeless tobacco: Never Used    Alcohol use Yes      Comment: occasionally     Review of Systems   Constitutional: Negative for chills, fatigue and fever.   HENT: Negative for congestion, ear discharge, ear pain, postnasal drip, rhinorrhea, sinus pressure, sneezing, sore throat and voice change.    Eyes: Negative for discharge and itching.   Respiratory: Negative for cough, shortness of breath and wheezing.    Cardiovascular: Negative for chest pain, palpitations and leg swelling.   Gastrointestinal: " Negative for abdominal pain, constipation, diarrhea, nausea and vomiting.   Endocrine: Negative for polydipsia, polyphagia and polyuria.   Genitourinary: Negative for dysuria, frequency, hematuria, urgency, vaginal bleeding, vaginal discharge and vaginal pain.   Musculoskeletal: Negative for arthralgias and myalgias.   Skin: Negative for rash and wound.   Neurological: Positive for dizziness and headaches. Negative for seizures, syncope, weakness and numbness.   Hematological: Negative for adenopathy. Does not bruise/bleed easily.   Psychiatric/Behavioral: Negative for self-injury and suicidal ideas. The patient is not nervous/anxious.        Physical Exam     Initial Vitals [03/28/18 1219]   BP Pulse Resp Temp SpO2   113/66 82 20 98.5 °F (36.9 °C) 97 %      MAP       81.67         Physical Exam    Nursing note and vitals reviewed.  Constitutional: She appears well-developed and well-nourished.   HENT:   Head: Normocephalic and atraumatic.   Right Ear: External ear normal.   Left Ear: External ear normal.   Nose: Nose normal. No epistaxis.   Mouth/Throat: Oropharynx is clear and moist.   Eyes: Conjunctivae are normal. Pupils are equal, round, and reactive to light. Right eye exhibits no discharge. Left eye exhibits no discharge.   Neck: Normal range of motion.   Pulmonary/Chest: Effort normal and breath sounds normal. No respiratory distress. She has no decreased breath sounds. She has no wheezes. She has no rhonchi. She has no rales.   Abdominal: She exhibits no distension.   Musculoskeletal: Normal range of motion.   Neurological: She is alert and oriented to person, place, and time. She has normal strength. No cranial nerve deficit or sensory deficit. She exhibits normal muscle tone. She displays a negative Romberg sign. Coordination and gait normal. GCS eye subscore is 4. GCS verbal subscore is 5. GCS motor subscore is 6.   Equal  strength bilaterally, equal bicep flexion and tricep extension strength, leg  extension and flexion strength appropriate and equal, foot plantar- and dorsi-flexion equal and appropriate   Skin: Skin is dry. Capillary refill takes less than 2 seconds.         ED Course   Procedures  Labs Reviewed - No data to display  EKG Readings: (Independently Interpreted)   Initial Reading: No STEMI. Rhythm: Normal Sinus Rhythm. Heart Rate: 76. Ectopy: No Ectopy.          Medical Decision Making:   Patient is a 32-year-old female who reports diarrhea for 2 weeks which she contributes to her metformin without abdominal pain as well as a buzzing sensation in her head that started this morning and is associated with dizziness and a mild headache.  Her blood sugar was hyperglycemic at 245.  For her uncontrolled diabetes she should follow-up with her primary care provider.  Following a thorough history and physical including a detailed neurologic examination the patient was found to have no abnormalities.  She was reexamined by Dr. Lew.  Patient was discharged home to follow up with her primary care provider for better diabetic control.  She understands she may return if her symptoms worsen or change.                      Clinical Impression:   The primary encounter diagnosis was Nonintractable headache, unspecified chronicity pattern, unspecified headache type. A diagnosis of Dizziness was also pertinent to this visit.    Disposition:   Disposition: Discharged  Condition: Stable                        Channing Shen, [facility]  03/28/18 4757

## 2018-03-29 ENCOUNTER — PES CALL (OUTPATIENT)
Dept: ADMINISTRATIVE | Facility: CLINIC | Age: 33
End: 2018-03-29

## 2018-03-29 LAB — POCT GLUCOSE: 245 MG/DL (ref 70–110)

## 2018-09-08 ENCOUNTER — HOSPITAL ENCOUNTER (EMERGENCY)
Facility: HOSPITAL | Age: 33
Discharge: HOME | End: 2018-09-09
Attending: EMERGENCY MEDICINE
Payer: MEDICAID

## 2018-09-08 DIAGNOSIS — L02.211 ABDOMINAL WALL ABSCESS: Primary | ICD-10-CM

## 2018-09-08 DIAGNOSIS — R73.9 HYPERGLYCEMIA: ICD-10-CM

## 2018-09-08 LAB — GLUCOSE SERPL-MCNC: 425 MG/DL (ref 70–110)

## 2018-09-08 PROCEDURE — 25000003 PHARM REV CODE 250: Performed by: NURSE PRACTITIONER

## 2018-09-08 PROCEDURE — 82962 GLUCOSE BLOOD TEST: CPT

## 2018-09-08 PROCEDURE — 96361 HYDRATE IV INFUSION ADD-ON: CPT

## 2018-09-08 PROCEDURE — 96375 TX/PRO/DX INJ NEW DRUG ADDON: CPT | Mod: 59

## 2018-09-08 PROCEDURE — 99284 EMERGENCY DEPT VISIT MOD MDM: CPT | Mod: 25

## 2018-09-08 PROCEDURE — 96365 THER/PROPH/DIAG IV INF INIT: CPT

## 2018-09-08 RX ORDER — LIDOCAINE HYDROCHLORIDE 10 MG/ML
10 INJECTION, SOLUTION EPIDURAL; INFILTRATION; INTRACAUDAL; PERINEURAL
Status: COMPLETED | OUTPATIENT
Start: 2018-09-08 | End: 2018-09-08

## 2018-09-08 RX ADMIN — LIDOCAINE HYDROCHLORIDE 100 MG: 10 INJECTION, SOLUTION EPIDURAL; INFILTRATION; INTRACAUDAL; PERINEURAL at 10:09

## 2018-09-09 VITALS
OXYGEN SATURATION: 100 % | SYSTOLIC BLOOD PRESSURE: 126 MMHG | BODY MASS INDEX: 40.22 KG/M2 | RESPIRATION RATE: 18 BRPM | WEIGHT: 227 LBS | DIASTOLIC BLOOD PRESSURE: 76 MMHG | HEART RATE: 96 BPM | TEMPERATURE: 99 F | HEIGHT: 63 IN

## 2018-09-09 LAB
ALBUMIN SERPL BCP-MCNC: 3.3 G/DL
ALP SERPL-CCNC: 120 U/L
ALT SERPL W/O P-5'-P-CCNC: 18 U/L
ANION GAP SERPL CALC-SCNC: 12 MMOL/L
AST SERPL-CCNC: 12 U/L
B-OH-BUTYR BLD STRIP-SCNC: 0.1 MMOL/L
BASOPHILS # BLD AUTO: 0.01 K/UL
BASOPHILS NFR BLD: 0.2 %
BILIRUB SERPL-MCNC: 0.2 MG/DL
BUN SERPL-MCNC: 10 MG/DL
CALCIUM SERPL-MCNC: 9.4 MG/DL
CHLORIDE SERPL-SCNC: 101 MMOL/L
CO2 SERPL-SCNC: 22 MMOL/L
CREAT SERPL-MCNC: 1 MG/DL
DIFFERENTIAL METHOD: ABNORMAL
EOSINOPHIL # BLD AUTO: 0.1 K/UL
EOSINOPHIL NFR BLD: 0.9 %
ERYTHROCYTE [DISTWIDTH] IN BLOOD BY AUTOMATED COUNT: 12.2 %
EST. GFR  (AFRICAN AMERICAN): >60 ML/MIN/1.73 M^2
EST. GFR  (NON AFRICAN AMERICAN): >60 ML/MIN/1.73 M^2
GLUCOSE SERPL-MCNC: 272 MG/DL (ref 70–110)
GLUCOSE SERPL-MCNC: 405 MG/DL
HCT VFR BLD AUTO: 40 %
HGB BLD-MCNC: 13.2 G/DL
LYMPHOCYTES # BLD AUTO: 3.4 K/UL
LYMPHOCYTES NFR BLD: 52.2 %
MCH RBC QN AUTO: 27.8 PG
MCHC RBC AUTO-ENTMCNC: 33 G/DL
MCV RBC AUTO: 84 FL
MONOCYTES # BLD AUTO: 0.3 K/UL
MONOCYTES NFR BLD: 4.6 %
NEUTROPHILS # BLD AUTO: 2.7 K/UL
NEUTROPHILS NFR BLD: 42.1 %
PLATELET # BLD AUTO: 215 K/UL
PMV BLD AUTO: 10.3 FL
POCT GLUCOSE: 272 MG/DL (ref 70–110)
POCT GLUCOSE: 277 MG/DL (ref 70–110)
POCT GLUCOSE: 377 MG/DL (ref 70–110)
POTASSIUM SERPL-SCNC: 4.1 MMOL/L
PROT SERPL-MCNC: 7.4 G/DL
RBC # BLD AUTO: 4.74 M/UL
SODIUM SERPL-SCNC: 135 MMOL/L
WBC # BLD AUTO: 6.48 K/UL

## 2018-09-09 PROCEDURE — 80053 COMPREHEN METABOLIC PANEL: CPT

## 2018-09-09 PROCEDURE — 87077 CULTURE AEROBIC IDENTIFY: CPT | Mod: 59

## 2018-09-09 PROCEDURE — C9113 INJ PANTOPRAZOLE SODIUM, VIA: HCPCS | Performed by: EMERGENCY MEDICINE

## 2018-09-09 PROCEDURE — 87186 SC STD MICRODIL/AGAR DIL: CPT

## 2018-09-09 PROCEDURE — S0077 INJECTION, CLINDAMYCIN PHOSP: HCPCS | Performed by: EMERGENCY MEDICINE

## 2018-09-09 PROCEDURE — 25000003 PHARM REV CODE 250: Performed by: EMERGENCY MEDICINE

## 2018-09-09 PROCEDURE — 82010 KETONE BODYS QUAN: CPT

## 2018-09-09 PROCEDURE — 87070 CULTURE OTHR SPECIMN AEROBIC: CPT

## 2018-09-09 PROCEDURE — 85025 COMPLETE CBC W/AUTO DIFF WBC: CPT

## 2018-09-09 PROCEDURE — 63600175 PHARM REV CODE 636 W HCPCS: Performed by: EMERGENCY MEDICINE

## 2018-09-09 RX ORDER — PANTOPRAZOLE SODIUM 40 MG/10ML
40 INJECTION, POWDER, LYOPHILIZED, FOR SOLUTION INTRAVENOUS
Status: COMPLETED | OUTPATIENT
Start: 2018-09-09 | End: 2018-09-09

## 2018-09-09 RX ORDER — CLINDAMYCIN PHOSPHATE 900 MG/50ML
900 INJECTION, SOLUTION INTRAVENOUS
Status: COMPLETED | OUTPATIENT
Start: 2018-09-09 | End: 2018-09-09

## 2018-09-09 RX ORDER — CLINDAMYCIN HYDROCHLORIDE 150 MG/1
300 CAPSULE ORAL 4 TIMES DAILY
Qty: 56 CAPSULE | Refills: 0 | Status: SHIPPED | OUTPATIENT
Start: 2018-09-09 | End: 2018-09-16

## 2018-09-09 RX ORDER — MUPIROCIN 20 MG/G
OINTMENT TOPICAL 3 TIMES DAILY
Qty: 22 G | Refills: 0 | Status: SHIPPED | OUTPATIENT
Start: 2018-09-09 | End: 2018-09-16

## 2018-09-09 RX ADMIN — LIDOCAINE HYDROCHLORIDE: 20 SOLUTION ORAL; TOPICAL at 01:09

## 2018-09-09 RX ADMIN — CLINDAMYCIN PHOSPHATE 900 MG: 18 INJECTION, SOLUTION INTRAVENOUS at 12:09

## 2018-09-09 RX ADMIN — PANTOPRAZOLE SODIUM 40 MG: 40 INJECTION, POWDER, LYOPHILIZED, FOR SOLUTION INTRAVENOUS at 01:09

## 2018-09-09 RX ADMIN — INSULIN HUMAN 7 UNITS: 100 INJECTION, SOLUTION PARENTERAL at 12:09

## 2018-09-09 RX ADMIN — SODIUM CHLORIDE 1000 ML: 0.9 INJECTION, SOLUTION INTRAVENOUS at 12:09

## 2018-09-09 NOTE — ED PROVIDER NOTES
"Encounter Date: 2018  SORT MSE:  Pt is a 32 y.o. female who presents for emergent consideration for abscess. Pt will be moved to room when one is available, otherwise will wait in waiting room with triage nurse supervision.  Pt arrived by ambulatory. She is not in distress. Orders have been placed. MARK Shen DNP [facility] 2018 10:31 PM     SCRIBE #1 NOTE: I, Patricia Bryan, am scribing for, and in the presence of,  Deepak Alvarez MD. I have scribed the following portions of the note - Other sections scribed: HPI, ROS.       History     Chief Complaint   Patient presents with    Abscess     pt c/o abscess to abdomen X 2 wks with yellowish colored drainage .     CC: Abscess    HPI: 31 y/o female with a past medical history of DM and GERD presents to ED for emergent evaluation of a suprapubic abscess that formed 2 weeks ago and "busted on its own," with yellow drainage, today.  Pt reports a hx of similar abscesses that are not always in the same location.  Pt reports abdominal pain around the area of the abscess. Pt notes that her blood glucose, when she visits her PCP, is 180.  Pt denies fever, dysuria, frequency, SOB, fever, chills, sore throat, weakness, numbness, chest pain, and back pain.  No tx at home. No other symptoms reported.    Primary Care: [facility]        The history is provided by the patient. No  was used.     Review of patient's allergies indicates:  No Known Allergies  Past Medical History:   Diagnosis Date    Diabetes mellitus     Fibroid, uterine     GERD (gastroesophageal reflux disease)     Ovarian cyst      Past Surgical History:   Procedure Laterality Date     SECTION, CLASSIC      x 2    CYST REMOVAL      from both breast x 2     Family History   Problem Relation Age of Onset    Hypertension Mother     Diabetes Father     Sickle cell trait Brother     Diabetes Maternal Grandmother      Social History     Tobacco Use    Smoking " status: Never Smoker    Smokeless tobacco: Never Used   Substance Use Topics    Alcohol use: Yes     Comment: occasionally    Drug use: No     Comment: occassionally     Review of Systems   Constitutional: Negative for chills and fever.   HENT: Negative for congestion and sore throat.    Eyes: Negative for pain.   Respiratory: Negative for cough and shortness of breath.    Cardiovascular: Negative for chest pain and palpitations.   Gastrointestinal: Negative for abdominal pain, diarrhea, nausea and vomiting.   Genitourinary: Negative for dysuria and urgency.   Musculoskeletal: Negative for back pain and neck pain.   Skin: Negative for wound (abscess).   Neurological: Negative for syncope and headaches.   All other systems reviewed and are negative.      Physical Exam     Initial Vitals [09/08/18 2233]   BP Pulse Resp Temp SpO2   131/77 91 16 98.2 °F (36.8 °C) 98 %      MAP       --         Physical Exam    Nursing note and vitals reviewed.  Constitutional: She appears well-developed and well-nourished.   Eyes: EOM are normal. Pupils are equal, round, and reactive to light.   Neck: Normal range of motion. Neck supple. No thyromegaly present. No JVD present.   Cardiovascular: Normal rate, regular rhythm, normal heart sounds and intact distal pulses. Exam reveals no gallop and no friction rub.    No murmur heard.  Pulmonary/Chest: Breath sounds normal. No respiratory distress.   Abdominal: Soft. Bowel sounds are normal. There is no tenderness.   Musculoskeletal: Normal range of motion. She exhibits no edema or tenderness.   Neurological: She is alert and oriented to person, place, and time. She has normal strength.   Skin: Skin is warm and dry.   Midline lower abdominal abscess that has scant spontaneous drainage. No fluctuance or cellulitis. I placed a bedside US on the area and do not see a fluid collection.          ED Course   Procedures  Labs Reviewed   CBC W/ AUTO DIFFERENTIAL - Abnormal; Notable for the  following components:       Result Value    Lymph% 52.2 (*)     All other components within normal limits   COMPREHENSIVE METABOLIC PANEL - Abnormal; Notable for the following components:    Sodium 135 (*)     CO2 22 (*)     Glucose 405 (*)     Albumin 3.3 (*)     All other components within normal limits   POCT GLUCOSE - Abnormal; Notable for the following components:    POCT Glucose 377 (*)     All other components within normal limits   CULTURE, AEROBIC  (SPECIFY SOURCE)   BETA - HYDROXYBUTYRATE, SERUM   POCT GLUCOSE MONITORING CONTINUOUS   POCT GLUCOSE MONITORING CONTINUOUS          Imaging Results    None         patient's blood sugars in the 400s.  She states she is noncompliant with her blood sugar medication although did take it yesterday.  Urged her to take it daily.  Follow up primary care this week for blood sugar control.  Counseled her that this infection may not fully be controlled until blood sugars are controlled.  No I and D at this time is appears to have spontaneously drained.  Will treat with antibiotics.  Stable for discharge. Blood sugar is down to 200's.  No evidence of DKA.             Scribe Attestation:   Scribe #1: I performed the above scribed service and the documentation accurately describes the services I performed. I attest to the accuracy of the note.    Attending Attestation:           Physician Attestation for Scribe:  Physician Attestation Statement for Scribe #1: I, Deepak Alvarez MD, reviewed documentation, as scribed by Patricia Bryan in my presence, and it is both accurate and complete.                    Clinical Impression:   The primary encounter diagnosis was Abdominal wall abscess. A diagnosis of Hyperglycemia was also pertinent to this visit.                             Deepak Alvarez MD  09/09/18 0147

## 2018-09-09 NOTE — ED TRIAGE NOTES
Patient presents to the ED via personal transportation, alone. Patient reports painful abscess to lower abdomen above csection scar. Patient reports purulent drainage from site. Denies fever, chills.

## 2018-09-10 LAB — POCT GLUCOSE: 428 MG/DL (ref 70–110)

## 2018-09-11 LAB
BACTERIA SPEC AEROBE CULT: NORMAL
BACTERIA SPEC AEROBE CULT: NORMAL

## 2018-12-22 LAB — POCT GLUCOSE: 363 MG/DL (ref 70–110)

## 2018-12-22 PROCEDURE — 82962 GLUCOSE BLOOD TEST: CPT | Mod: 54,59

## 2018-12-22 PROCEDURE — 81025 URINE PREGNANCY TEST: CPT | Performed by: NURSE PRACTITIONER

## 2018-12-22 PROCEDURE — 99283 EMERGENCY DEPT VISIT LOW MDM: CPT | Mod: 25

## 2018-12-22 PROCEDURE — 96372 THER/PROPH/DIAG INJ SC/IM: CPT

## 2018-12-23 ENCOUNTER — HOSPITAL ENCOUNTER (EMERGENCY)
Facility: HOSPITAL | Age: 33
Discharge: HOME | End: 2018-12-23
Attending: EMERGENCY MEDICINE
Payer: MEDICAID

## 2018-12-23 VITALS
WEIGHT: 220 LBS | TEMPERATURE: 99 F | DIASTOLIC BLOOD PRESSURE: 73 MMHG | HEART RATE: 80 BPM | RESPIRATION RATE: 18 BRPM | OXYGEN SATURATION: 98 % | SYSTOLIC BLOOD PRESSURE: 111 MMHG | HEIGHT: 63 IN | BODY MASS INDEX: 38.98 KG/M2

## 2018-12-23 DIAGNOSIS — M25.551 RIGHT HIP PAIN: ICD-10-CM

## 2018-12-23 DIAGNOSIS — R73.9 HYPERGLYCEMIA: Primary | ICD-10-CM

## 2018-12-23 DIAGNOSIS — N61.1 BREAST ABSCESS: ICD-10-CM

## 2018-12-23 LAB
ALLENS TEST: ABNORMAL
B-HCG UR QL: NEGATIVE
BACTERIA #/AREA URNS HPF: ABNORMAL /HPF
BILIRUB UR QL STRIP: NEGATIVE
CLARITY UR: CLEAR
COLOR UR: ABNORMAL
CTP QC/QA: YES
DELSYS: ABNORMAL
GLUCOSE UR QL STRIP: ABNORMAL
HCO3 UR-SCNC: 23.9 MMOL/L (ref 24–28)
HGB UR QL STRIP: NEGATIVE
KETONES UR QL STRIP: NEGATIVE
LEUKOCYTE ESTERASE UR QL STRIP: NEGATIVE
MICROSCOPIC COMMENT: ABNORMAL
NITRITE UR QL STRIP: NEGATIVE
PCO2 BLDA: 46 MMHG (ref 35–45)
PH SMN: 7.32 [PH] (ref 7.35–7.45)
PH UR STRIP: 5 [PH] (ref 5–8)
PO2 BLDA: 22 MMHG (ref 40–60)
POC BE: -2 MMOL/L
POC SATURATED O2: 33 % (ref 95–100)
POC TCO2: 25 MMOL/L (ref 24–29)
PROT UR QL STRIP: NEGATIVE
RBC #/AREA URNS HPF: 5 /HPF (ref 0–4)
SAMPLE: ABNORMAL
SITE: ABNORMAL
SP GR UR STRIP: >1.03 (ref 1–1.03)
SQUAMOUS #/AREA URNS HPF: 2 /HPF
URN SPEC COLLECT METH UR: ABNORMAL
UROBILINOGEN UR STRIP-ACNC: NEGATIVE EU/DL
WBC #/AREA URNS HPF: 2 /HPF (ref 0–5)
YEAST URNS QL MICRO: ABNORMAL

## 2018-12-23 PROCEDURE — 82803 BLOOD GASES ANY COMBINATION: CPT

## 2018-12-23 PROCEDURE — 99900035 HC TECH TIME PER 15 MIN (STAT)

## 2018-12-23 PROCEDURE — 81000 URINALYSIS NONAUTO W/SCOPE: CPT

## 2018-12-23 PROCEDURE — 25000003 PHARM REV CODE 250: Performed by: PHYSICIAN ASSISTANT

## 2018-12-23 PROCEDURE — 63600175 PHARM REV CODE 636 W HCPCS: Performed by: PHYSICIAN ASSISTANT

## 2018-12-23 RX ORDER — KETOROLAC TROMETHAMINE 30 MG/ML
30 INJECTION, SOLUTION INTRAMUSCULAR; INTRAVENOUS
Status: COMPLETED | OUTPATIENT
Start: 2018-12-23 | End: 2018-12-23

## 2018-12-23 RX ORDER — SULFAMETHOXAZOLE AND TRIMETHOPRIM 800; 160 MG/1; MG/1
1 TABLET ORAL 2 TIMES DAILY
Qty: 14 TABLET | Refills: 0 | Status: SHIPPED | OUTPATIENT
Start: 2018-12-23 | End: 2018-12-30

## 2018-12-23 RX ORDER — CLOBETASOL PROPIONATE 0.46 MG/ML
SOLUTION TOPICAL
Refills: 3 | COMMUNITY
Start: 2018-11-06

## 2018-12-23 RX ORDER — CLINDAMYCIN PHOSPHATE 11.9 MG/ML
SOLUTION TOPICAL
Refills: 3 | COMMUNITY
Start: 2018-11-06

## 2018-12-23 RX ORDER — LIDOCAINE HYDROCHLORIDE 10 MG/ML
10 INJECTION INFILTRATION; PERINEURAL
Status: COMPLETED | OUTPATIENT
Start: 2018-12-23 | End: 2018-12-23

## 2018-12-23 RX ORDER — MELOXICAM 7.5 MG/1
7.5 TABLET ORAL DAILY
Qty: 12 TABLET | Refills: 0 | Status: SHIPPED | OUTPATIENT
Start: 2018-12-23

## 2018-12-23 RX ADMIN — KETOROLAC TROMETHAMINE 30 MG: 30 INJECTION, SOLUTION INTRAMUSCULAR at 02:12

## 2018-12-23 RX ADMIN — LIDOCAINE HYDROCHLORIDE 10 ML: 10 INJECTION, SOLUTION INFILTRATION; PERINEURAL at 02:12

## 2018-12-23 NOTE — ED PROVIDER NOTES
Encounter Date: 2018  This is a SORT/MSE of a 33 y.o. female with DM presenting to the ED with c/o left breast abscess and right hip pain x 2 weeks. Care will be transferred to an alternate provider when patient is roomed for a full evaluation and final disposition. Patient is aware that he/she is awaiting a room in the emergency department, where another provider will review results, evaluate and treat as needed. FROYLAN Simons DNP     History     Chief Complaint   Patient presents with    Boil on left breast     Pt with reports of rt hip pain and a boil noted to her left breast. Pt reports her symptoms started approximately 2 weeks ago. Pt reports taking Aleve OTC without relief. Pt with hx of DM. Denies any recent trauma.    rt hip pain     33-year-old female with history of diabetes and hidradenitis suppurative a presents to the emergency department with multiple complaints.  She notes 2 week history of atraumatic right lateral hip pain that is sharp, positional, and occurred after increased amount of walking during the holiday season.  Pain does not radiate.  Denies fever, low back pain, abdominal pain, and urinary symptoms. No history of similar symptoms. Denies personal history of cancer.  Not on daily steroid.      Patient is also noting 2 week history of a lump to her left breast.  Denies pain and drainage to site.  Denies fever.  States symptoms were consistent with her past flare-ups of hidradenitis.  Patient has not followed up with a surgeon.  Not currently on antibiotics.    Glucose noted to be elevated.  Denies nausea, vomiting, and has not taken her diabetic medicines today.  Denies history of DKA.          Review of patient's allergies indicates:  No Known Allergies  Past Medical History:   Diagnosis Date    Diabetes mellitus     Fibroid, uterine     GERD (gastroesophageal reflux disease)     Ovarian cyst      Past Surgical History:   Procedure Laterality Date     SECTION, CLASSIC       x 2    CYST REMOVAL      from both breast x 2     Family History   Problem Relation Age of Onset    Hypertension Mother     Diabetes Father     Sickle cell trait Brother     Diabetes Maternal Grandmother      Social History     Tobacco Use    Smoking status: Never Smoker    Smokeless tobacco: Never Used   Substance Use Topics    Alcohol use: Yes     Comment: occasionally    Drug use: No     Comment: occassionally     Review of Systems   Constitutional: Negative for fever.   Respiratory: Negative for cough and shortness of breath.    Cardiovascular: Negative for chest pain.   Gastrointestinal: Negative for abdominal pain, nausea and vomiting.   Genitourinary: Negative for dysuria, frequency, hematuria, urgency, vaginal bleeding and vaginal discharge.   Musculoskeletal: Positive for arthralgias. Negative for back pain and neck pain.   Skin:        (+) breast lesion   All other systems reviewed and are negative.      Physical Exam     Initial Vitals [12/22/18 2344]   BP Pulse Resp Temp SpO2   126/76 86 18 98.4 °F (36.9 °C) 100 %      MAP       --         Physical Exam    Nursing note and vitals reviewed.  Constitutional: She appears well-developed and well-nourished. She is not diaphoretic. No distress.   HENT:   Head: Normocephalic and atraumatic.   Nose: Nose normal.   Eyes: Conjunctivae and EOM are normal. Right eye exhibits no discharge. Left eye exhibits no discharge.   Neck: Normal range of motion. No tracheal deviation present. No JVD present.   Cardiovascular: Normal rate, regular rhythm and normal heart sounds. Exam reveals no friction rub.    No murmur heard.  Pulmonary/Chest: Breath sounds normal. No stridor. No respiratory distress. She has no wheezes. She has no rhonchi. She has no rales. She exhibits no tenderness.   Abdominal: Soft. She exhibits no distension. There is no tenderness. There is no rigidity, no rebound and no guarding.   Musculoskeletal: Normal range of motion.   No bony TTP  to hips or down midline spine. Ambulating without limp. Full ROM of hips.    Neurological: She is alert and oriented to person, place, and time.   Skin: Skin is warm and dry. No rash noted. No pallor.        2cm fluctuant, nontender skin lesion to L breast. No erythema. No drainage.          ED Course   Procedures  Labs Reviewed   URINALYSIS, REFLEX TO URINE CULTURE - Abnormal; Notable for the following components:       Result Value    Specific Gravity, UA >1.030 (*)     Glucose, UA 3+ (*)     All other components within normal limits    Narrative:     Preferred Collection Type->Urine, Clean Catch   URINALYSIS MICROSCOPIC - Abnormal; Notable for the following components:    RBC, UA 5 (*)     Bacteria, UA Moderate (*)     All other components within normal limits    Narrative:     Preferred Collection Type->Urine, Clean Catch   POCT GLUCOSE - Abnormal; Notable for the following components:    POCT Glucose 363 (*)     All other components within normal limits   ISTAT PROCEDURE - Abnormal; Notable for the following components:    POC PH 7.324 (*)     POC PCO2 46.0 (*)     POC PO2 22 (*)     POC HCO3 23.9 (*)     POC SATURATED O2 33 (*)     All other components within normal limits   POCT URINE PREGNANCY          Imaging Results    None          Medical Decision Making:   History:   Old Medical Records: I decided to obtain old medical records.  Initial Assessment:   33-year-old female with multiple complaints  Clinical Tests:   Lab Tests: Ordered and Reviewed  ED Management:  Hip pain most consistent with bursitis.  Not consistent with septic joint there is no neurovascular compromise.  No history of trauma. No risk factors and young age make underlying malignant bony lesion less likely; will defer imaging today.  Bedside ultrasound shows fluid collection to left breast; drained in the ED the with success.  Likely secondary to her a reported history of hidradenitis suppurative.  Hyperglycemic in the ED incidentally.   Her blood sugar today is consistent with her past blood sugars here, however.  No symptoms or ketonuria suggestive of DKA today. VBG reviewed with Dr. Stubbs.     Advising PCP follow up. Strict return precautions discussed. Agreeable to plan.     Other:   I have discussed this case with another health care provider.                      Clinical Impression:   The primary encounter diagnosis was Hyperglycemia. Diagnoses of Breast abscess and Right hip pain were also pertinent to this visit.      Disposition:   Disposition: Discharged  Condition: Stable                        Judd Villar PA-C  12/23/18 0449

## 2018-12-23 NOTE — ED TRIAGE NOTES
Pt with reports of rt hip pain and a boil noted to her left breast. Pt reports her symptoms started approximately 2 weeks ago. Pt reports taking Aleve OTC without relief. Pt with hx of DM. Denies any recent trauma. Denies urinary symptoms. Pain level 9/10

## 2019-07-25 ENCOUNTER — HOSPITAL ENCOUNTER (EMERGENCY)
Facility: HOSPITAL | Age: 34
Discharge: HOME | End: 2019-07-25
Attending: EMERGENCY MEDICINE
Payer: MEDICAID

## 2019-07-25 VITALS
WEIGHT: 220 LBS | BODY MASS INDEX: 37.56 KG/M2 | DIASTOLIC BLOOD PRESSURE: 74 MMHG | TEMPERATURE: 99 F | RESPIRATION RATE: 18 BRPM | HEIGHT: 64 IN | HEART RATE: 95 BPM | SYSTOLIC BLOOD PRESSURE: 130 MMHG | OXYGEN SATURATION: 99 %

## 2019-07-25 DIAGNOSIS — A09 DIARRHEA OF INFECTIOUS ORIGIN: Primary | ICD-10-CM

## 2019-07-25 DIAGNOSIS — R10.84 GENERALIZED ABDOMINAL PAIN: ICD-10-CM

## 2019-07-25 DIAGNOSIS — M25.572 LEFT ANKLE PAIN: ICD-10-CM

## 2019-07-25 DIAGNOSIS — M79.672 LEFT FOOT PAIN: ICD-10-CM

## 2019-07-25 DIAGNOSIS — K52.9 ENTERITIS: ICD-10-CM

## 2019-07-25 LAB
ALBUMIN SERPL BCP-MCNC: 3.5 G/DL (ref 3.5–5.2)
ALP SERPL-CCNC: 120 U/L (ref 55–135)
ALT SERPL W/O P-5'-P-CCNC: 16 U/L (ref 10–44)
ANION GAP SERPL CALC-SCNC: 9 MMOL/L (ref 8–16)
AST SERPL-CCNC: 10 U/L (ref 10–40)
B-HCG UR QL: NEGATIVE
BACTERIA #/AREA URNS HPF: NORMAL /HPF
BASOPHILS # BLD AUTO: 0.02 K/UL (ref 0–0.2)
BASOPHILS NFR BLD: 0.3 % (ref 0–1.9)
BILIRUB SERPL-MCNC: 0.4 MG/DL (ref 0.1–1)
BILIRUB UR QL STRIP: NEGATIVE
BUN SERPL-MCNC: 9 MG/DL (ref 6–20)
CALCIUM SERPL-MCNC: 9.3 MG/DL (ref 8.7–10.5)
CHLORIDE SERPL-SCNC: 100 MMOL/L (ref 95–110)
CLARITY UR: CLEAR
CO2 SERPL-SCNC: 26 MMOL/L (ref 23–29)
COLOR UR: YELLOW
CREAT SERPL-MCNC: 0.8 MG/DL (ref 0.5–1.4)
DIFFERENTIAL METHOD: NORMAL
EOSINOPHIL # BLD AUTO: 0 K/UL (ref 0–0.5)
EOSINOPHIL NFR BLD: 0.5 % (ref 0–8)
ERYTHROCYTE [DISTWIDTH] IN BLOOD BY AUTOMATED COUNT: 12.4 % (ref 11.5–14.5)
EST. GFR  (AFRICAN AMERICAN): >60 ML/MIN/1.73 M^2
EST. GFR  (NON AFRICAN AMERICAN): >60 ML/MIN/1.73 M^2
GLUCOSE SERPL-MCNC: 289 MG/DL (ref 70–110)
GLUCOSE UR QL STRIP: ABNORMAL
HCT VFR BLD AUTO: 41.4 % (ref 37–48.5)
HGB BLD-MCNC: 13.6 G/DL (ref 12–16)
HGB UR QL STRIP: NEGATIVE
KETONES UR QL STRIP: ABNORMAL
LEUKOCYTE ESTERASE UR QL STRIP: NEGATIVE
LIPASE SERPL-CCNC: 38 U/L (ref 4–60)
LYMPHOCYTES # BLD AUTO: 2.1 K/UL (ref 1–4.8)
LYMPHOCYTES NFR BLD: 26.3 % (ref 18–48)
MCH RBC QN AUTO: 28.3 PG (ref 27–31)
MCHC RBC AUTO-ENTMCNC: 32.9 G/DL (ref 32–36)
MCV RBC AUTO: 86 FL (ref 82–98)
MICROSCOPIC COMMENT: NORMAL
MONOCYTES # BLD AUTO: 0.4 K/UL (ref 0.3–1)
MONOCYTES NFR BLD: 5.3 % (ref 4–15)
NEUTROPHILS # BLD AUTO: 5.4 K/UL (ref 1.8–7.7)
NEUTROPHILS NFR BLD: 67.9 % (ref 38–73)
NITRITE UR QL STRIP: NEGATIVE
PH UR STRIP: 5 [PH] (ref 5–8)
PLATELET # BLD AUTO: 258 K/UL (ref 150–350)
PMV BLD AUTO: 10.2 FL (ref 9.2–12.9)
POCT GLUCOSE: 274 MG/DL (ref 70–110)
POTASSIUM SERPL-SCNC: 3.9 MMOL/L (ref 3.5–5.1)
PROT SERPL-MCNC: 7.8 G/DL (ref 6–8.4)
PROT UR QL STRIP: NEGATIVE
RBC # BLD AUTO: 4.81 M/UL (ref 4–5.4)
SODIUM SERPL-SCNC: 135 MMOL/L (ref 136–145)
SP GR UR STRIP: >1.03 (ref 1–1.03)
SQUAMOUS #/AREA URNS HPF: 5 /HPF
URN SPEC COLLECT METH UR: ABNORMAL
UROBILINOGEN UR STRIP-ACNC: NEGATIVE EU/DL
WBC # BLD AUTO: 8 K/UL (ref 3.9–12.7)
YEAST URNS QL MICRO: NORMAL

## 2019-07-25 PROCEDURE — 96360 HYDRATION IV INFUSION INIT: CPT

## 2019-07-25 PROCEDURE — 96372 THER/PROPH/DIAG INJ SC/IM: CPT | Mod: 59

## 2019-07-25 PROCEDURE — 99285 EMERGENCY DEPT VISIT HI MDM: CPT | Mod: 25

## 2019-07-25 PROCEDURE — 85025 COMPLETE CBC W/AUTO DIFF WBC: CPT

## 2019-07-25 PROCEDURE — 83690 ASSAY OF LIPASE: CPT

## 2019-07-25 PROCEDURE — 25500020 PHARM REV CODE 255: Performed by: NURSE PRACTITIONER

## 2019-07-25 PROCEDURE — 63600175 PHARM REV CODE 636 W HCPCS: Performed by: NURSE PRACTITIONER

## 2019-07-25 PROCEDURE — 82962 GLUCOSE BLOOD TEST: CPT

## 2019-07-25 PROCEDURE — 81000 URINALYSIS NONAUTO W/SCOPE: CPT

## 2019-07-25 PROCEDURE — 81025 URINE PREGNANCY TEST: CPT

## 2019-07-25 PROCEDURE — 80053 COMPREHEN METABOLIC PANEL: CPT

## 2019-07-25 RX ORDER — METRONIDAZOLE 500 MG/1
500 TABLET ORAL EVERY 8 HOURS
Qty: 21 TABLET | Refills: 0 | Status: SHIPPED | OUTPATIENT
Start: 2019-07-25 | End: 2019-08-01

## 2019-07-25 RX ORDER — ONDANSETRON 4 MG/1
4 TABLET, ORALLY DISINTEGRATING ORAL EVERY 6 HOURS PRN
Qty: 20 TABLET | Refills: 0 | OUTPATIENT
Start: 2019-07-25 | End: 2023-11-11

## 2019-07-25 RX ORDER — CIPROFLOXACIN 500 MG/1
500 TABLET ORAL EVERY 12 HOURS
Qty: 14 TABLET | Refills: 0 | Status: SHIPPED | OUTPATIENT
Start: 2019-07-25 | End: 2019-08-01

## 2019-07-25 RX ORDER — DICYCLOMINE HYDROCHLORIDE 10 MG/ML
20 INJECTION INTRAMUSCULAR
Status: COMPLETED | OUTPATIENT
Start: 2019-07-25 | End: 2019-07-25

## 2019-07-25 RX ADMIN — DICYCLOMINE HYDROCHLORIDE 20 MG: 20 INJECTION, SOLUTION INTRAMUSCULAR at 05:07

## 2019-07-25 RX ADMIN — IOHEXOL 100 ML: 350 INJECTION, SOLUTION INTRAVENOUS at 06:07

## 2019-07-25 RX ADMIN — SODIUM CHLORIDE 1000 ML: 0.9 INJECTION, SOLUTION INTRAVENOUS at 05:07

## 2019-07-25 NOTE — ED TRIAGE NOTES
Pt complains of stomach pain and left foot pain.  Pt reports diarrhea and abdominal pain since 7/5/19 upon leaving [facility].  Pt reports walking a lot in a cave in [facility] and believes this is what contributes to her foot pain.  Pt denies injury to the foot.

## 2019-07-25 NOTE — ED PROVIDER NOTES
Encounter Date: 2019    SCRIBE #1 NOTE: I, Janamarcella Sanchez, am scribing for, and in the presence of,  Emir Brooks NP. I have scribed the following portions of the note - Other sections scribed: ELIZABETH AUGUSTIN .       History     Chief Complaint   Patient presents with    Abdominal Pain     Reports abd pain and diarrhea since ; left ankle pain; denies vomiting     This is a 33 y.o. female who has a medical history diabetes mellitus, fibroid, ovarian cyst and GERD presents to the ED complaining of abdominal pain and an a left ankle pain. The patient reports that on 2019 that she took a trip to [place]. She shares that she been having abdominal pain since then, but it worsen this morning. The patient report assciated symptoms of diarrhea and nausea. She shares that she using the restroom more than four times a day. The patient also reports a left ankle/foot pain. She reports that she was doing an activity where she was walking up and and down a cave. Since then, she has had edema and pain in her left ankle and it radiates to her foot. The patient denies any emesis.          Review of patient's allergies indicates:  No Known Allergies  Past Medical History:   Diagnosis Date    Diabetes mellitus     Fibroid, uterine     GERD (gastroesophageal reflux disease)     Ovarian cyst      Past Surgical History:   Procedure Laterality Date     SECTION, CLASSIC      x 2    CYST REMOVAL      from both breast x 2     Family History   Problem Relation Age of Onset    Hypertension Mother     Diabetes Father     Sickle cell trait Brother     Diabetes Maternal Grandmother      Social History     Tobacco Use    Smoking status: Never Smoker    Smokeless tobacco: Never Used   Substance Use Topics    Alcohol use: Yes     Comment: occasionally    Drug use: Yes     Types: Marijuana     Comment: occassionally     Review of Systems   Constitutional: Negative for fever.   HENT: Negative for rhinorrhea.    Eyes:  Negative for visual disturbance.   Respiratory: Negative for cough and shortness of breath.    Cardiovascular: Negative for chest pain.   Gastrointestinal: Positive for abdominal pain, diarrhea and nausea. Negative for vomiting.   Musculoskeletal: Negative for back pain.   Skin: Negative for pallor.   Neurological: Negative for headaches.   Psychiatric/Behavioral: Negative for confusion.       Physical Exam     Initial Vitals [07/25/19 1553]   BP Pulse Resp Temp SpO2   125/77 107 17 98.5 °F (36.9 °C) 97 %      MAP       --         Physical Exam    Nursing note and vitals reviewed.  Constitutional: She appears well-developed and well-nourished.   HENT:   Head: Normocephalic and atraumatic.   Eyes: EOM are normal. Pupils are equal, round, and reactive to light.   Neck: Normal range of motion. Neck supple.   Cardiovascular: Normal rate, regular rhythm and normal heart sounds. Exam reveals no gallop and no friction rub.    No murmur heard.  Pulmonary/Chest: Breath sounds normal. She has no wheezes. She has no rhonchi. She has no rales.   Abdominal: Normal appearance. There is no tenderness. There is no rigidity, no rebound, no guarding, no CVA tenderness, no tenderness at McBurney's point and negative Wakefield's sign.   No abdominal tenderness to palpation.  Abdomen is soft without rigidity or guarding.  No rebound tenderness.   Musculoskeletal: Normal range of motion.        Left ankle: She exhibits normal range of motion, no swelling, no ecchymosis and no deformity. Tenderness. Lateral malleolus tenderness found. Achilles tendon normal.   Mild tenderness to the left lateral ankle and the lateral aspect of the dorsum of the left foot.  No swelling, erythema, bruising, warmth, deformity, bony step-off, or other abnormality.  Range of motion of the ankle is normal. DP pulse is normal. There is no pain or tenderness to any other aspect of the foot.   Neurological: She is alert and oriented to person, place, and time.          ED Course   Procedures  Labs Reviewed   COMPREHENSIVE METABOLIC PANEL - Abnormal; Notable for the following components:       Result Value    Sodium 135 (*)     Glucose 289 (*)     All other components within normal limits   URINALYSIS, REFLEX TO URINE CULTURE - Abnormal; Notable for the following components:    Specific Gravity, UA >1.030 (*)     Glucose, UA 3+ (*)     Ketones, UA Trace (*)     All other components within normal limits    Narrative:     Preferred Collection Type->Urine, Clean Catch   POCT GLUCOSE - Abnormal; Notable for the following components:    POCT Glucose 274 (*)     All other components within normal limits   CBC W/ AUTO DIFFERENTIAL   LIPASE   URINALYSIS MICROSCOPIC    Narrative:     Preferred Collection Type->Urine, Clean Catch   PREGNANCY TEST, URINE RAPID   POCT GLUCOSE MONITORING CONTINUOUS          Imaging Results           CT Abdomen Pelvis With Contrast (Final result)  Result time 07/25/19 18:36:12    Final result by Jamir Link MD (07/25/19 18:36:12)                 Impression:      Long segment small bowel wall thickening with mild surrounding inflammatory change involving the distal ileum.  Favor infectious or inflammatory etiologies.  Clinical correlation advised.  No evidence of perforation or bowel obstruction.    3.6 cm fundal uterine fibroid.    Mild hepatomegaly with likely hepatic steatosis.    Two stable subcentimeter enhancing foci in the right hepatic lobe.  These are too small to definitively characterize but likely hemangiomas.    This report was flagged in Epic as abnormal.      Electronically signed by: Jamir Link MD  Date:    07/25/2019  Time:    18:36             Narrative:    EXAMINATION:  CT ABDOMEN PELVIS WITH CONTRAST    CLINICAL HISTORY:  LLQ pain, suspect diverticulitis;    TECHNIQUE:  Low dose axial CT images obtained throughout the region of the abdomen and pelvis following the administration 100 cc mL Omnipaque 350 intravenous contrast.   Axial, sagittal and coronal reconstructions were performed.    COMPARISON:  11/14/2017    FINDINGS:  Lung bases are clear and the visualized portions of the heart and mediastinum are unremarkable.    The liver is mildly enlarged with the inferior aspect of the right hepatic lobe extending well below the level of the kidney.  Liver measures over 22 cm in craniocaudal dimension.  Subtle hypoattenuation throughout the liver suggestive of steatosis.  There are 2 small rounded relatively hyperattenuating foci in the right lobe of the liver stable from prior likely flash filling hemangiomas.  Gallbladder, spleen, pancreas, and adrenal glands appear within normal limits.    Kidneys appear normal. The ureters are decompressed. Urinary bladder unremarkable.    3.6 cm fibroid in the uterine fundus.  Adnexal structures are unremarkable.    Stomach appears within normal limits.    There is a long segment of circumferential bowel wall thickening involving the distal ileum.  Mucosal enhancement and hyperemia noting prominence of the Vasa recta.  No associated obstruction.  The colon and appendix appear within normal limits.    No free air or free fluid identified within the abdomen or pelvis.  Mildly prominent lymph nodes at the mesenteric root.    Aorta tapers normally throughout its visualized course.    Osseous structures exhibit mild degenerative changes. No fracture or focal osseous destructive lesion.                               X-Ray Foot Complete Left (Final result)  Result time 07/25/19 16:58:20    Final result by Jamir Link MD (07/25/19 16:58:20)                 Impression:      No evidence of fracture or dislocation.      Electronically signed by: Jamir Link MD  Date:    07/25/2019  Time:    16:58             Narrative:    EXAMINATION:  XR FOOT COMPLETE 3 VIEW LEFT    CLINICAL HISTORY:  .  Pain in left foot    TECHNIQUE:  AP, lateral and oblique views of the foot were  performed.    COMPARISON:  None    FINDINGS:  Osseous structures appear intact without evidence of an acute displaced fracture or focal osseous destructive lesion.  No apparent dislocation.  No advanced degenerative change.  No focal soft tissue swelling or radiopaque foreign body.                               X-Ray Ankle Complete Left (Final result)  Result time 07/25/19 16:33:32    Final result by Nadia Ram MD (07/25/19 16:33:32)                 Impression:      No injury identified.      Electronically signed by: Nadia Ram MD  Date:    07/25/2019  Time:    16:33             Narrative:    EXAMINATION:  XR ANKLE COMPLETE 3 VIEW LEFT    CLINICAL HISTORY:  Pain in left ankle and joints of left foot    TECHNIQUE:  AP, lateral and oblique views of the left ankle were performed.    COMPARISON:  None    FINDINGS:  On three views of the left ankle the mortise joint appears intact.  Talar dome is maintained.  Small inferior calcaneal spur noted.    I detect no fracture, dislocation, radiopaque retained foreign body, lytic or blastic lesion, erosion or chondrocalcinosis.                                 Medical Decision Making:   History:   Old Medical Records: I decided to obtain old medical records.  Differential Diagnosis:   Diverticulitis, colitis, bowel obstruction, enteritis, others  Clinical Tests:   Lab Tests: Ordered and Reviewed  Radiological Study: Ordered and Reviewed  ED Management:  HPI and physical exam as above.     33-year-old female presenting for evaluation of diarrhea that has been ongoing for about 20 days since returning from [place].  She also reports generalized abdominal cramping.  No focal area of pain.  She also reports left ankle and left foot pain. Patient is very well-appearing, pleasant, afebrile, and in no distress.  Abdomen is soft and nontender without rigidity or guarding. Exam is remarkable for some mild tenderness overlying the lateral malleolus of the left ankle as well as  the lateral aspect of the left foot.  No other abnormalities on physical exam.  X-ray of the left ankle and left foot are negative. Labs remarkable for hyperglycemia.  All other labs are unremarkable. CT with evidence small bowel inflammation involving the distal ileum.  Most likely infectious given clinical history.  I will treat with antibiotics given that symptoms have been ongoing for 20 days.  Patient remains well-appearing and is tolerating p.o. without difficulty prior to discharge. Also provided crutches and an air splint.  Advised patient to follow up with her PCP for re-evaluation and further management.  ED return precautions given. All questions regarding diagnosis and plan were answered to the patient's fullest possible satisfaction. Patient expressed understanding of diagnosis, discharge instructions, and return precautions.              Scribe Attestation:   Scribe #1: I performed the above scribed service and the documentation accurately describes the services I performed. I attest to the accuracy of the note.    Attending Attestation:           Physician Attestation for Scribe:  Physician Attestation Statement for Scribe #1: I, Emir Brooks NP, reviewed documentation, as scribed by Jana Sanchez in my presence, and it is both accurate and complete.                    Clinical Impression:       ICD-10-CM ICD-9-CM   1. Diarrhea of infectious origin A09 009.3   2. Left ankle pain M25.572 719.47   3. Left foot pain M79.672 729.5   4. Enteritis K52.9 558.9   5. Generalized abdominal pain R10.84 789.07         Disposition:   Disposition: Discharged  Condition: Stable                        Emir Brooks NP  07/25/19 2002

## 2019-07-26 NOTE — DISCHARGE INSTRUCTIONS
Take antibiotics as prescribed until they are gone.  You should not have any pills left over.  Take nausea medication as needed only as prescribed.      Schedule a follow-up appointment with your regular doctor for further testing and treatment.  Return to the emergency department immediately for any new or worsening symptoms or as needed for any additional concerns.    Thank you for coming to our Emergency Department today. It is important to remember that some problems are difficult to diagnose and may not be found during your first visit. Be sure to follow up with your primary care doctor.  If you do not have one, you may contact the one listed on your discharge paperwork or you may also call the [facility] Appointment Desk at [phone] to schedule an appointment with one.     Return to the ER with any questions/concerns, new/concerning symptoms, worsening or failure to improve. Do not drive or make any important decisions for 24 hours if you have received any pain medications, sedatives or mood altering drugs during your ER visit.

## 2019-10-08 ENCOUNTER — HOSPITAL ENCOUNTER (EMERGENCY)
Facility: HOSPITAL | Age: 34
Discharge: HOME | End: 2019-10-09
Attending: EMERGENCY MEDICINE
Payer: MEDICAID

## 2019-10-08 DIAGNOSIS — L02.91 ABSCESS: Primary | ICD-10-CM

## 2019-10-08 DIAGNOSIS — R73.9 HYPERGLYCEMIA: ICD-10-CM

## 2019-10-08 LAB
B-HCG UR QL: NEGATIVE
BASOPHILS # BLD AUTO: 0.03 K/UL (ref 0–0.2)
BASOPHILS NFR BLD: 0.3 % (ref 0–1.9)
CTP QC/QA: YES
DIFFERENTIAL METHOD: ABNORMAL
EOSINOPHIL # BLD AUTO: 0.1 K/UL (ref 0–0.5)
EOSINOPHIL NFR BLD: 0.5 % (ref 0–8)
ERYTHROCYTE [DISTWIDTH] IN BLOOD BY AUTOMATED COUNT: 12.5 % (ref 11.5–14.5)
HCT VFR BLD AUTO: 42.2 % (ref 37–48.5)
HGB BLD-MCNC: 13.3 G/DL (ref 12–16)
LYMPHOCYTES # BLD AUTO: 2.4 K/UL (ref 1–4.8)
LYMPHOCYTES NFR BLD: 22.9 % (ref 18–48)
MCH RBC QN AUTO: 27.3 PG (ref 27–31)
MCHC RBC AUTO-ENTMCNC: 31.5 G/DL (ref 32–36)
MCV RBC AUTO: 87 FL (ref 82–98)
MONOCYTES # BLD AUTO: 0.7 K/UL (ref 0.3–1)
MONOCYTES NFR BLD: 6.3 % (ref 4–15)
NEUTROPHILS # BLD AUTO: 7.3 K/UL (ref 1.8–7.7)
NEUTROPHILS NFR BLD: 70 % (ref 38–73)
PLATELET # BLD AUTO: 295 K/UL (ref 150–350)
PMV BLD AUTO: 10.1 FL (ref 9.2–12.9)
POCT GLUCOSE: 366 MG/DL (ref 70–110)
RBC # BLD AUTO: 4.88 M/UL (ref 4–5.4)
WBC # BLD AUTO: 10.41 K/UL (ref 3.9–12.7)

## 2019-10-08 PROCEDURE — 63600175 PHARM REV CODE 636 W HCPCS: Performed by: PHYSICIAN ASSISTANT

## 2019-10-08 PROCEDURE — 96374 THER/PROPH/DIAG INJ IV PUSH: CPT | Mod: 59

## 2019-10-08 PROCEDURE — 81025 URINE PREGNANCY TEST: CPT | Performed by: NURSE PRACTITIONER

## 2019-10-08 PROCEDURE — 25000003 PHARM REV CODE 250: Performed by: NURSE PRACTITIONER

## 2019-10-08 PROCEDURE — 85025 COMPLETE CBC W/AUTO DIFF WBC: CPT

## 2019-10-08 PROCEDURE — 80053 COMPREHEN METABOLIC PANEL: CPT

## 2019-10-08 PROCEDURE — 82962 GLUCOSE BLOOD TEST: CPT

## 2019-10-08 PROCEDURE — 99284 EMERGENCY DEPT VISIT MOD MDM: CPT | Mod: 25

## 2019-10-08 PROCEDURE — 96361 HYDRATE IV INFUSION ADD-ON: CPT

## 2019-10-08 PROCEDURE — 10060 I&D ABSCESS SIMPLE/SINGLE: CPT

## 2019-10-08 PROCEDURE — 96375 TX/PRO/DX INJ NEW DRUG ADDON: CPT

## 2019-10-08 RX ORDER — LIDOCAINE HYDROCHLORIDE 10 MG/ML
10 INJECTION INFILTRATION; PERINEURAL
Status: COMPLETED | OUTPATIENT
Start: 2019-10-08 | End: 2019-10-08

## 2019-10-08 RX ORDER — ACETAMINOPHEN 500 MG
1000 TABLET ORAL
Status: COMPLETED | OUTPATIENT
Start: 2019-10-08 | End: 2019-10-09

## 2019-10-08 RX ORDER — KETOROLAC TROMETHAMINE 30 MG/ML
10 INJECTION, SOLUTION INTRAMUSCULAR; INTRAVENOUS
Status: COMPLETED | OUTPATIENT
Start: 2019-10-08 | End: 2019-10-08

## 2019-10-08 RX ORDER — ONDANSETRON 2 MG/ML
4 INJECTION INTRAMUSCULAR; INTRAVENOUS
Status: COMPLETED | OUTPATIENT
Start: 2019-10-08 | End: 2019-10-08

## 2019-10-08 RX ORDER — MORPHINE SULFATE 10 MG/ML
4 INJECTION INTRAMUSCULAR; INTRAVENOUS; SUBCUTANEOUS
Status: COMPLETED | OUTPATIENT
Start: 2019-10-08 | End: 2019-10-08

## 2019-10-08 RX ADMIN — KETOROLAC TROMETHAMINE 10 MG: 30 INJECTION, SOLUTION INTRAMUSCULAR at 11:10

## 2019-10-08 RX ADMIN — ONDANSETRON HYDROCHLORIDE 4 MG: 2 SOLUTION INTRAMUSCULAR; INTRAVENOUS at 11:10

## 2019-10-08 RX ADMIN — MORPHINE SULFATE 4 MG: 10 INJECTION INTRAVENOUS at 11:10

## 2019-10-08 RX ADMIN — SODIUM CHLORIDE 1000 ML: 0.9 INJECTION, SOLUTION INTRAVENOUS at 11:10

## 2019-10-08 RX ADMIN — LIDOCAINE HYDROCHLORIDE 10 ML: 10 INJECTION, SOLUTION INFILTRATION; PERINEURAL at 11:10

## 2019-10-09 VITALS
DIASTOLIC BLOOD PRESSURE: 69 MMHG | SYSTOLIC BLOOD PRESSURE: 136 MMHG | OXYGEN SATURATION: 97 % | WEIGHT: 217 LBS | RESPIRATION RATE: 18 BRPM | HEART RATE: 112 BPM | HEIGHT: 63 IN | TEMPERATURE: 98 F | BODY MASS INDEX: 38.45 KG/M2

## 2019-10-09 LAB
ALBUMIN SERPL BCP-MCNC: 3.7 G/DL (ref 3.5–5.2)
ALP SERPL-CCNC: 132 U/L (ref 55–135)
ALT SERPL W/O P-5'-P-CCNC: 16 U/L (ref 10–44)
ANION GAP SERPL CALC-SCNC: 11 MMOL/L (ref 8–16)
AST SERPL-CCNC: 11 U/L (ref 10–40)
BILIRUB SERPL-MCNC: 0.4 MG/DL (ref 0.1–1)
BUN SERPL-MCNC: 9 MG/DL (ref 6–20)
CALCIUM SERPL-MCNC: 9.5 MG/DL (ref 8.7–10.5)
CHLORIDE SERPL-SCNC: 100 MMOL/L (ref 95–110)
CO2 SERPL-SCNC: 24 MMOL/L (ref 23–29)
CREAT SERPL-MCNC: 0.9 MG/DL (ref 0.5–1.4)
EST. GFR  (AFRICAN AMERICAN): >60 ML/MIN/1.73 M^2
EST. GFR  (NON AFRICAN AMERICAN): >60 ML/MIN/1.73 M^2
GLUCOSE SERPL-MCNC: 369 MG/DL (ref 70–110)
POTASSIUM SERPL-SCNC: 4.4 MMOL/L (ref 3.5–5.1)
PROT SERPL-MCNC: 8.1 G/DL (ref 6–8.4)
SODIUM SERPL-SCNC: 135 MMOL/L (ref 136–145)

## 2019-10-09 PROCEDURE — 25000003 PHARM REV CODE 250: Performed by: PHYSICIAN ASSISTANT

## 2019-10-09 RX ORDER — SULFAMETHOXAZOLE AND TRIMETHOPRIM 800; 160 MG/1; MG/1
1 TABLET ORAL 2 TIMES DAILY
Qty: 14 TABLET | Refills: 0 | Status: SHIPPED | OUTPATIENT
Start: 2019-10-09 | End: 2019-10-16

## 2019-10-09 RX ORDER — HYDROCODONE BITARTRATE AND ACETAMINOPHEN 5; 325 MG/1; MG/1
1 TABLET ORAL EVERY 4 HOURS PRN
Qty: 10 TABLET | Refills: 0 | OUTPATIENT
Start: 2019-10-09 | End: 2022-03-16

## 2019-10-09 RX ADMIN — ACETAMINOPHEN 1000 MG: 500 TABLET ORAL at 12:10

## 2019-10-09 NOTE — DISCHARGE INSTRUCTIONS
Take Bactrim twice daily for 7 days until completely finished, even if symptoms improve.  Take over-the-counter Tylenol and Ibuprofen as directed for pain and Norco every 4 hours as needed for breakthrough pain.  Wash the area with mild soap and water and pat dry immediately.  Do not submerge under water, including tub, ocean or pool. You may remove packing in the shower in 2 days. Keep area covered until incision has closed.  Follow-up with your primary care physician.  Return to the ED for any concerning symptoms.    Our goal in the emergency department is to always give you outstanding care and exceptional service. You may receive a survey by mail or e-mail in the next week regarding your experience in our ED. We would greatly appreciate your completing and returning the survey. Your feedback provides us with a way to recognize our staff who give very good care and it helps us learn how to improve when your experience was below our aspiration of excellence.

## 2019-10-09 NOTE — ED PROVIDER NOTES
Encounter Date: 10/8/2019    SCRIBE #1 NOTE: I, Sosa Gamboa, am scribing for, and in the presence of,  Erika De Jesus PA-C. I have scribed the following portions of the note - Other sections scribed: HPI, ROS.       History     Chief Complaint   Patient presents with    Recurrent Skin Infections     Pt here with c/o boil to inner left thigh. Pt reports since this morning the size has doubled. PT denies any drainage.     CC: Boil    HPI: This 34 y.o. Female with a PMHx of DM, GERD, and Ovarian cyst presents to the ED for evaluation of inner L thigh pain at the site boil for x1 month. She c/o x2 episodes of diarrhea (yesterday), nonproductive cough, bilateral leg aches, and chills. She reports boil gradually increased in size this morning. She notes bearing weight exacerbates pain. She denies chest pain, SOB, vaginal discharge, nausea, emesis, numbness, tingling, dysuria or urinary frequency. No prior tx.     The history is provided by the patient. No  was used.     Review of patient's allergies indicates:  No Known Allergies  Past Medical History:   Diagnosis Date    Diabetes mellitus     Fibroid, uterine     GERD (gastroesophageal reflux disease)     Ovarian cyst      Past Surgical History:   Procedure Laterality Date     SECTION, CLASSIC      x 2    CYST REMOVAL      from both breast x 2     Family History   Problem Relation Age of Onset    Hypertension Mother     Diabetes Father     Sickle cell trait Brother     Diabetes Maternal Grandmother      Social History     Tobacco Use    Smoking status: Never Smoker    Smokeless tobacco: Never Used   Substance Use Topics    Alcohol use: Yes     Comment: occasionally    Drug use: Yes     Types: Marijuana     Comment: occassionally     Review of Systems   Constitutional: Positive for fever. Negative for chills.   HENT: Negative for congestion.    Respiratory: Positive for cough (nonproductive). Negative for shortness of breath.     Cardiovascular: Negative for chest pain.   Gastrointestinal: Positive for diarrhea. Negative for nausea and vomiting.   Genitourinary: Negative for dysuria, frequency, urgency and vaginal discharge.   Musculoskeletal: Negative for back pain and neck pain.        (+) bilateral leg aches     Skin: Negative for rash.        (+) boil to inner L thigh   Neurological: Negative for headaches.   Psychiatric/Behavioral: Negative for dysphoric mood.       Physical Exam     Initial Vitals [10/08/19 2227]   BP Pulse Resp Temp SpO2   128/78 (!) 114 18 (!) 100.8 °F (38.2 °C) 99 %      MAP       --         Physical Exam    Nursing note and vitals reviewed.  Constitutional: She appears well-developed and well-nourished. She is not diaphoretic. No distress.   HENT:   Head: Normocephalic and atraumatic.   Eyes: Conjunctivae and EOM are normal. Pupils are equal, round, and reactive to light.   Neck: Normal range of motion. Neck supple.   Cardiovascular: Normal rate, regular rhythm, normal heart sounds and intact distal pulses.   Pulmonary/Chest: Breath sounds normal. No respiratory distress. She has no wheezes. She has no rales.   Abdominal: Soft. Bowel sounds are normal. There is no tenderness. There is no rebound and no guarding.   Musculoskeletal: Normal range of motion. She exhibits no edema or tenderness.   Neurological: She is alert and oriented to person, place, and time. She has normal strength. GCS score is 15. GCS eye subscore is 4. GCS verbal subscore is 5. GCS motor subscore is 6.   Skin: Skin is warm and dry. Abscess noted.   4 x 6 cm raised area of erythema, swelling, tenderness and fluctuance noted to the left groin region. No vulvar ttp. No perirectal ttp.    Psychiatric: She has a normal mood and affect. Thought content normal.         ED Course   I & D - Incision and Drainage  Date/Time: 10/9/2019 1:35 AM  Performed by: Erika De Jesus PA-C  Authorized by: Kody Holloway MD   Body area: lower  extremity  Location details: left leg  Anesthesia: local infiltration    Anesthesia:  Local Anesthetic: lidocaine 1% without epinephrine  Anesthetic total: 8 mL  Scalpel size: 11  Incision type: single straight  Complexity: simple  Drainage: purulent and  bloody  Drainage amount: copious  Wound treatment: incision,  drainage,  deloculation,  expression of material and  wound packed  Packing material: 1/4 in gauze  Patient tolerance: Patient tolerated the procedure well with no immediate complications        Labs Reviewed   CBC W/ AUTO DIFFERENTIAL - Abnormal; Notable for the following components:       Result Value    Mean Corpuscular Hemoglobin Conc 31.5 (*)     All other components within normal limits   COMPREHENSIVE METABOLIC PANEL - Abnormal; Notable for the following components:    Sodium 135 (*)     Glucose 369 (*)     All other components within normal limits   POCT GLUCOSE - Abnormal; Notable for the following components:    POCT Glucose 366 (*)     All other components within normal limits   POCT URINE PREGNANCY   POCT GLUCOSE MONITORING CONTINUOUS          Imaging Results    None          Medical Decision Making:   History:   Old Medical Records: I decided to obtain old medical records.  Clinical Tests:   Lab Tests: Ordered and Reviewed       APC / Resident Notes:   Pt presenting 2/2 abscess. Patient did not have surrounding cellulitis. Patient initially febrile and tachycardic on arrival. Basic labs were obtained and she was given 1L IVFs, Toradol, Tylenol, and Morphine IV. CBC without leukocytosis and CMP unremarkable. POCT glucose elevated and consistent with previous lab values, no signs or symptoms to suggest DKA. Please see procedure note for I and D which patient tolerated. Patient dc home with Bactrim BID x 7 days. No other complaints and Neurovascularly intact.     Cautious return precautions discussed with patient and/or family with understanding. Prompt f/u with primary care physician  discussed. All questions answered. Patient comfortable with plan.                    Clinical Impression:       ICD-10-CM ICD-9-CM   1. Abscess L02.91 682.9   2. Hyperglycemia R73.9 790.29       I, Erika De Jesus, personally performed the services described in this documentation. All medical record entries made by the scribe were at my direction and in my presence. I have reviewed the chart and agree that the record reflects my personal performance and is accurate and complete.    Disposition:   Disposition: Discharged  Condition: Stable                        Erika De Jesus PA-C  10/09/19 0138

## 2019-10-09 NOTE — ED TRIAGE NOTES
Patient reports an abscess to left inner thigh x 1 month.  Patient reports that abscess became tender today.  Patient reports recurrent skin infections.  Patient denies urinary symptoms, but does reports low abdominal pain.

## 2020-03-09 ENCOUNTER — HOSPITAL ENCOUNTER (EMERGENCY)
Facility: HOSPITAL | Age: 35
Discharge: HOME | End: 2020-03-09
Attending: EMERGENCY MEDICINE
Payer: MEDICAID

## 2020-03-09 VITALS
TEMPERATURE: 99 F | WEIGHT: 220 LBS | HEART RATE: 83 BPM | DIASTOLIC BLOOD PRESSURE: 80 MMHG | BODY MASS INDEX: 37.56 KG/M2 | OXYGEN SATURATION: 96 % | HEIGHT: 64 IN | RESPIRATION RATE: 18 BRPM | SYSTOLIC BLOOD PRESSURE: 115 MMHG

## 2020-03-09 DIAGNOSIS — J02.9 ACUTE PHARYNGITIS, UNSPECIFIED ETIOLOGY: Primary | ICD-10-CM

## 2020-03-09 LAB — GROUP A STREP, MOLECULAR: NEGATIVE

## 2020-03-09 PROCEDURE — 96372 THER/PROPH/DIAG INJ SC/IM: CPT

## 2020-03-09 PROCEDURE — 63600175 PHARM REV CODE 636 W HCPCS: Performed by: PHYSICIAN ASSISTANT

## 2020-03-09 PROCEDURE — 99284 EMERGENCY DEPT VISIT MOD MDM: CPT | Mod: 25

## 2020-03-09 PROCEDURE — 87651 STREP A DNA AMP PROBE: CPT

## 2020-03-09 RX ORDER — KETOROLAC TROMETHAMINE 30 MG/ML
10 INJECTION, SOLUTION INTRAMUSCULAR; INTRAVENOUS
Status: COMPLETED | OUTPATIENT
Start: 2020-03-09 | End: 2020-03-09

## 2020-03-09 RX ADMIN — KETOROLAC TROMETHAMINE 10 MG: 30 INJECTION, SOLUTION INTRAMUSCULAR; INTRAVENOUS at 12:03

## 2020-03-09 RX ADMIN — PENICILLIN G BENZATHINE 1.2 MILLION UNITS: 1200000 INJECTION, SUSPENSION INTRAMUSCULAR at 12:03

## 2020-03-09 NOTE — DISCHARGE INSTRUCTIONS
Take over-the-counter Chloraseptic throat spray and Cepacol throat lozenges for relief.  You may also drink tea mixed with 1-2 tsp of honey and gargle with warm salt water.  Take over-the-counter Tylenol and/or Motrin as directed as needed for fever and pain relief.  Follow-up with your primary care provider in 2-3 days for re-evaluation.  Return to the ED for any concerning symptoms.    Our goal in the emergency department is to always give you outstanding care and exceptional service. You may receive a survey by mail or e-mail in the next week regarding your experience in our ED. We would greatly appreciate your completing and returning the survey. Your feedback provides us with a way to recognize our staff who give very good care and it helps us learn how to improve when your experience was below our aspiration of excellence.

## 2020-03-09 NOTE — MEDICAL/APP STUDENT
"  History     Chief Complaint   Patient presents with    Sore Throat     sore throat off and on x 2 days.  states feels like something is in throat when she eats or drinks.      Patient is a 37 y.o. F with pmhx of GERD and diabetes who presents to the ED complaining of right-sided throat and ear pain. The pain started Friday and has been gradually worsening. She has not been able to eat 2/2 odynophagia. She has Amoxicillin leftover from a previous unrelated medical encounter, which she took bid Saturday and . States that she feels like there is a "boil" in the back of her throat, and that the pain does not feel like the typical strep throat pain. Patient has never experienced this before. She still has her tonsils. She denies fever, chills, N/V. Endorses fatigue and a dry cough.           Past Medical History:   Diagnosis Date    Diabetes mellitus     Fibroid, uterine     GERD (gastroesophageal reflux disease)     Ovarian cyst        Past Surgical History:   Procedure Laterality Date     SECTION, CLASSIC      x 2    CYST REMOVAL      from both breast x 2       Family History   Problem Relation Age of Onset    Hypertension Mother     Diabetes Father     Sickle cell trait Brother     Diabetes Maternal Grandmother        Social History     Tobacco Use    Smoking status: Never Smoker    Smokeless tobacco: Never Used   Substance Use Topics    Alcohol use: Yes     Comment: occasionally    Drug use: Yes     Types: Marijuana     Comment: occassionally       Review of Systems   Constitutional: Positive for fatigue (2/2 not eating). Negative for chills, diaphoresis and fever.   HENT: Positive for ear pain (R side), sore throat (R side) and trouble swallowing (odynophagia). Negative for congestion, drooling, facial swelling and sinus pain.    Eyes: Negative for pain and visual disturbance.   Respiratory: Positive for cough (dry, 2/2 throat irritation). Negative for shortness of breath and wheezing. " "   Cardiovascular: Negative for chest pain and palpitations.   Gastrointestinal: Negative for abdominal pain, constipation, diarrhea, nausea and vomiting.   Genitourinary: Negative for dysuria, flank pain and hematuria.   Musculoskeletal: Negative for arthralgias and myalgias.   Skin: Negative for rash.   Neurological: Negative for dizziness, light-headedness and headaches.       Physical Exam   /72 (BP Location: Right arm, Patient Position: Sitting)   Pulse 85   Temp 98.3 °F (36.8 °C) (Oral)   Resp 16   Ht 5' 4" (1.626 m)   Wt 99.8 kg (220 lb)   SpO2 100%   BMI 37.76 kg/m²     Physical Exam    Nursing note and vitals reviewed.  Constitutional: She appears well-developed and well-nourished. She is not diaphoretic. No distress.   HENT:   Head: Normocephalic and atraumatic.   Mouth/Throat: Oropharynx is clear and moist.   Mild erythema to the R TM. No sinus tenderness. No oropharyngeal exudate, erythema, or edema noted. Uvula midline.   Eyes: Conjunctivae and EOM are normal. Pupils are equal, round, and reactive to light.   Neck: Normal range of motion. Neck supple. No JVD present.   No edema, erythema, lymphadenopathy, or appreciable mass.   Cardiovascular: Normal rate, regular rhythm, normal heart sounds and intact distal pulses. Exam reveals no gallop and no friction rub.    No murmur heard.  Pulmonary/Chest: Breath sounds normal. No respiratory distress. She has no wheezes.   Abdominal: Soft. Bowel sounds are normal. She exhibits no distension. There is no tenderness.   Musculoskeletal: Normal range of motion.   Lymphadenopathy:     She has no cervical adenopathy.   Neurological: She is alert and oriented to person, place, and time.   Skin: Skin is warm and dry. Capillary refill takes less than 2 seconds.   Psychiatric: She has a normal mood and affect.         ED Course         "

## 2020-03-09 NOTE — ED TRIAGE NOTES
"Pt presents to ED with sore throat at right side x 2 days. Pt states, " It feels like a boil or something. It only hurts when I swallow." Also reports right ear pain that started this AM. Denies fever and chills. Denies cough and congestion. Denies taking meds PTA. NAD noted.   "

## 2020-03-09 NOTE — ED NOTES
"Pt states feels like something in side of throat when she swallows. Pt states "it's not the flu, something is right here", clear speech, full sentences  "

## 2020-03-11 NOTE — ED PROVIDER NOTES
"Encounter Date: 3/9/2020       History     Chief Complaint   Patient presents with    Sore Throat     sore throat off and on x 2 days.  states feels like something is in throat when she eats or drinks.      Patient is a 37 y.o. F with pmhx of GERD and diabetes who presents to the ED complaining of right-sided throat and ear pain. The pain started Friday and has been gradually worsening. She has not been able to eat 2/2 odynophagia. She has Amoxicillin leftover from a previous unrelated medical encounter, which she took bid Saturday and . States that she feels like there is a "boil" in the back of her throat, and that the pain does not feel like the typical strep throat pain. Patient has never experienced this before. She still has her tonsils. She denies fever, chills, N/V. Endorses fatigue.    The history is provided by the patient.     Review of patient's allergies indicates:  No Known Allergies  Past Medical History:   Diagnosis Date    Diabetes mellitus     Fibroid, uterine     GERD (gastroesophageal reflux disease)     Ovarian cyst      Past Surgical History:   Procedure Laterality Date     SECTION, CLASSIC      x 2    CYST REMOVAL      from both breast x 2     Family History   Problem Relation Age of Onset    Hypertension Mother     Diabetes Father     Sickle cell trait Brother     Diabetes Maternal Grandmother      Social History     Tobacco Use    Smoking status: Never Smoker    Smokeless tobacco: Never Used   Substance Use Topics    Alcohol use: Yes     Comment: occasionally    Drug use: Yes     Types: Marijuana     Comment: occassionally     Review of Systems   Constitutional: Positive for fatigue. Negative for chills and fever.   HENT: Positive for ear pain, sore throat and trouble swallowing.    Eyes: Negative for visual disturbance.   Respiratory: Negative for cough and shortness of breath.    Cardiovascular: Negative for chest pain.   Gastrointestinal: Negative for " abdominal pain.   Genitourinary: Negative for dysuria.   Musculoskeletal: Negative for back pain.   Skin: Negative for wound.   Neurological: Negative for numbness.   Psychiatric/Behavioral: Negative for dysphoric mood.       Physical Exam     Initial Vitals [03/09/20 0927]   BP Pulse Resp Temp SpO2   116/72 85 16 98.3 °F (36.8 °C) 100 %      MAP       --         Physical Exam    Nursing note and vitals reviewed.  Constitutional: She appears well-developed and well-nourished. She is not diaphoretic. No distress.   HENT:   Head: Normocephalic and atraumatic.   Mouth/Throat: Oropharynx is clear and moist. No oropharyngeal exudate.   Mild erythema to the R TM. Normal ear canals. No sinus  Tenderness. No post-oropharyngeal exudate, erythema, or edema noted. Uvula midline.    Eyes: Conjunctivae and EOM are normal. Pupils are equal, round, and reactive to light.   Neck: Normal range of motion. Neck supple.   Bilateral cervical lymphadenopathy. No meningismus.    Cardiovascular: Normal rate, regular rhythm, normal heart sounds and intact distal pulses.   Pulmonary/Chest: Breath sounds normal. No respiratory distress. She has no wheezes. She has no rales.   Musculoskeletal: Normal range of motion. She exhibits no edema or tenderness.   Neurological: She is alert and oriented to person, place, and time. She has normal strength. GCS score is 15. GCS eye subscore is 4. GCS verbal subscore is 5. GCS motor subscore is 6.   Skin: Skin is warm and dry.   Psychiatric: She has a normal mood and affect. Thought content normal.         ED Course   Procedures  Labs Reviewed   GROUP A STREP, MOLECULAR          Imaging Results    None                APC / Resident Notes:   This is an evaluation of a 34 y.o. female that presents to the Emergency Department for sore throat and R otalgia. The patient is a non-toxic, afebrile, and well appearing female. On physical exam, the tonsils are symmetrical at +2 with minimal erythema and without  exudates. The uvula is midline with no drooling, hoarseness, trismus, facial swelling, meningeal signs; no findings to suggest peritonsillar or retropharyngeal abscess, epiglottitis, or airway compromise. There is cervical lymphadenopathy. TM's WNL. Breath sounds clear and equal to ascultation bilaterally. Mucus membranes are moist and she appears well hydrated.     Vital Signs Are Reassuring. Rapid Strep Test: Negative.     Given the above findings, my overall impression is acute pharyngitis. I do not suspect OM, OE, peritonsillar abscess, retropharyngeal abscess, epiglotitis, meningitis, significant dehydration or airway compromise. Rapid Strep negative, culture pending to r/o strep pharyngitis.     ED Course: Given Centor Criteria 2/4, will treat empirically with Bicillin IM, Toradol given for pain. DC Meds: Tylenol/Ibuprofen PRN, sore throat self care DC instructions. The diagnosis, treatment plan, instructions for follow-up and reevaluation with Primary Care as well as ED return precautions have been discussed with the patient and understanding of the information was verbalized. All questions or concerns from the patient have been addressed.                               Clinical Impression:       ICD-10-CM ICD-9-CM   1. Acute pharyngitis, unspecified etiology J02.9 462         Disposition:   Disposition: Discharged  Condition: Stable     ED Disposition Condition    Discharge Stable        ED Prescriptions     None        Follow-up Information     Follow up With Specialties Details Why Contact Info    Osbaldo Burgess NP Internal Medicine Schedule an appointment as soon as possible for a visit   [address] [facility]  [place] [address]  [phone]                                       Erika De Jesus PA-C  03/10/20 7996

## 2020-09-29 ENCOUNTER — HOSPITAL ENCOUNTER (EMERGENCY)
Facility: HOSPITAL | Age: 35
Discharge: HOME | End: 2020-09-29
Attending: EMERGENCY MEDICINE
Payer: MEDICAID

## 2020-09-29 VITALS
DIASTOLIC BLOOD PRESSURE: 79 MMHG | WEIGHT: 222 LBS | OXYGEN SATURATION: 98 % | BODY MASS INDEX: 39.34 KG/M2 | RESPIRATION RATE: 19 BRPM | HEART RATE: 71 BPM | HEIGHT: 63 IN | TEMPERATURE: 98 F | SYSTOLIC BLOOD PRESSURE: 125 MMHG

## 2020-09-29 DIAGNOSIS — M79.672 LEFT FOOT PAIN: ICD-10-CM

## 2020-09-29 DIAGNOSIS — L03.116 CELLULITIS OF LEFT FOOT: Primary | ICD-10-CM

## 2020-09-29 LAB
B-HCG UR QL: NEGATIVE
CTP QC/QA: YES

## 2020-09-29 PROCEDURE — 99284 EMERGENCY DEPT VISIT MOD MDM: CPT | Mod: 25

## 2020-09-29 PROCEDURE — 81025 URINE PREGNANCY TEST: CPT | Performed by: EMERGENCY MEDICINE

## 2020-09-29 RX ORDER — CLINDAMYCIN HYDROCHLORIDE 300 MG/1
300 CAPSULE ORAL EVERY 8 HOURS
Qty: 30 CAPSULE | Refills: 0 | Status: SHIPPED | OUTPATIENT
Start: 2020-09-29 | End: 2020-10-09

## 2020-09-29 RX ORDER — CIPROFLOXACIN 500 MG/1
500 TABLET ORAL EVERY 12 HOURS
Qty: 20 TABLET | Refills: 0 | Status: SHIPPED | OUTPATIENT
Start: 2020-09-29 | End: 2020-10-09

## 2020-09-29 NOTE — ED PROVIDER NOTES
Encounter Date: 2020       History     Chief Complaint   Patient presents with    Foot Pain     stepped on a toothpick yesterday    Otalgia     left ear pain to touch     34-year-old female with a history of type 2 diabetes, fibroids, hepatic steatosis, and obesity presenting for evaluation of left foot pain secondary to stepping on a toothpick yesterday.  Reports that the toothpick was partially imbedded in the soft tissue of the bottom of her foot but she removed it prior to arrival.  Reports that pain has worsened and redness has not surrounding the area.  Also reports left otalgia that has been present for the past month.  Reports pain is worse with manipulation of the tragus.  Denies drainage or changes in hearing.  No medications or other treatments attempted for any of her symptoms.        Review of patient's allergies indicates:  No Known Allergies  Past Medical History:   Diagnosis Date    Diabetes mellitus     Fibroid, uterine     GERD (gastroesophageal reflux disease)     Ovarian cyst      Past Surgical History:   Procedure Laterality Date     SECTION, CLASSIC      x 2    CYST REMOVAL      from both breast x 2     Family History   Problem Relation Age of Onset    Hypertension Mother     Diabetes Father     Sickle cell trait Brother     Diabetes Maternal Grandmother      Social History     Tobacco Use    Smoking status: Never Smoker    Smokeless tobacco: Never Used   Substance Use Topics    Alcohol use: Yes     Comment: occasionally    Drug use: Yes     Types: Marijuana     Comment: occassionally     Review of Systems   Constitutional: Negative for chills, fatigue and fever.   HENT: Positive for ear pain. Negative for congestion, ear discharge, hearing loss, nosebleeds, postnasal drip, rhinorrhea, sinus pressure and sore throat.    Eyes: Negative for photophobia and pain.   Respiratory: Negative for apnea, cough, choking, chest tightness, shortness of breath, wheezing and  stridor.    Cardiovascular: Negative for chest pain, palpitations and leg swelling.   Gastrointestinal: Negative for abdominal pain, constipation, diarrhea, nausea and vomiting.   Genitourinary: Negative for dysuria, flank pain, hematuria, pelvic pain and vaginal discharge.   Musculoskeletal: Negative for arthralgias, back pain, gait problem and neck pain.        +foot pain   Skin: Positive for color change (Redness) and wound. Negative for pallor and rash.   Neurological: Negative for dizziness, seizures, syncope, facial asymmetry, light-headedness and headaches.   Hematological: Negative for adenopathy.   Psychiatric/Behavioral: Negative for confusion. The patient is not nervous/anxious.        Physical Exam     Initial Vitals [09/29/20 1041]   BP Pulse Resp Temp SpO2   120/71 87 18 98.2 °F (36.8 °C) 96 %      MAP       --         Physical Exam    Nursing note and vitals reviewed.  Constitutional: Vital signs are normal. She appears well-developed and well-nourished. She is not diaphoretic. She is Obese . She is active.  Non-toxic appearance. She does not have a sickly appearance. She does not appear ill. No distress.   HENT:   Head: Normocephalic and atraumatic.   Right Ear: Hearing, tympanic membrane, external ear and ear canal normal.   Left Ear: Hearing, tympanic membrane, external ear and ear canal normal.   Nose: Nose normal.   Left TM and ear canal are normal.  No exudate, swelling, drainage.  No TM erythema, bulging, fluid level, or rupture.  No mastoid swelling, erythema, or tenderness.   Eyes: Conjunctivae and EOM are normal. Right eye exhibits no discharge. Left eye exhibits no discharge.   Neck: Normal range of motion. Neck supple. No tracheal deviation present.   Cardiovascular: Normal rate.   Pulmonary/Chest: No stridor. No respiratory distress.   Abdominal: Soft. She exhibits no distension. There is no abdominal tenderness.   Musculoskeletal: Normal range of motion. No tenderness.   Neurological:  She is alert and oriented to person, place, and time. She has normal strength. No cranial nerve deficit or sensory deficit.   Skin: Skin is warm and dry.   Pinpoint wound to the left plantar forefoot.  There is surrounding erythema, warmth, and induration.  No fluctuance.  Range of motion of all toes is normal.   Psychiatric: She has a normal mood and affect. Her behavior is normal. Judgment and thought content normal.         ED Course   Procedures  Labs Reviewed   POCT URINE PREGNANCY          Imaging Results          X-Ray Foot Complete Left (Final result)  Result time 09/29/20 11:29:43    Final result by Santo Garnica MD (09/29/20 11:29:43)                 Impression:      As above      Electronically signed by: Santo Garnica MD  Date:    09/29/2020  Time:    11:29             Narrative:    EXAMINATION:  XR FOOT COMPLETE 3 VIEW LEFT    CLINICAL HISTORY:  .  Pain in left foot    TECHNIQUE:  AP, lateral and oblique views of the left foot were performed.    COMPARISON:  Left foot 07/25/2019    FINDINGS:  No acute fractures or dislocations.  There is no osteoblastic or lytic lesions.  Soft tissues are unremarkable.  There is a small plantar calcaneal spur.                                 Medical Decision Making:   History:   Old Medical Records: I decided to obtain old medical records.  Differential Diagnosis:   Cellulitis, retained foreign body, osteomyelitis, abscess, otitis media, otitis externa, TM rupture, mastoiditis, others  Clinical Tests:   Radiological Study: Ordered and Reviewed  ED Management:  HPI and physical exam as above.    Physical exam remarkable for cellulitis to the left plantar forefoot secondary to a small pinpoint puncture wound.  No fluctuance.  X-ray without evidence of foreign body or other acute abnormality.  No abnormality on exam of the left ear.  Uncertain etiology of pain with manipulation of the tragus.  Will treat cellulitis with Cipro and clindamycin.  No evidence of systemic  symptoms. Advised patient to follow up with Podiatry for re-evaluation and further management.  ED return precautions given. All questions regarding diagnosis and plan were answered to the patient's fullest possible satisfaction. Patient expressed understanding of diagnosis, discharge instructions, and return precautions.            Patient note was created using [employer] voice dictation software.  Any errors in syntax or information may not have been identified and edited prior to signing this note.                               Clinical Impression:       ICD-10-CM ICD-9-CM   1. Cellulitis of left foot  L03.116 682.7   2. Left foot pain  M79.672 729.5                      Disposition:   Disposition: Discharged  Condition: Stable     ED Disposition Condition    Discharge Stable        ED Prescriptions     Medication Sig Dispense Start Date End Date Auth. Provider    clindamycin (CLEOCIN) 300 MG capsule Take 1 capsule (300 mg total) by mouth every 8 (eight) hours. for 10 days 30 capsule 9/29/2020 10/9/2020 Emir Brooks NP    ciprofloxacin HCl (CIPRO) 500 MG tablet Take 1 tablet (500 mg total) by mouth every 12 (twelve) hours. for 10 days 20 tablet 9/29/2020 10/9/2020 Emir Brooks NP        Follow-up Information     Follow up With Specialties Details Why Contact Info    Blank Spangler DPM Podiatry, Wound Care Schedule an appointment as soon as possible for a visit in 3 days For further evaluation [address] [facility]  [place] [address]  [phone]      [facility] Emergency Medicine Go to  If symptoms worsen, As needed [address]  [facility] [address] [phone]                                       Emir Brooks NP  09/29/20 8648

## 2020-09-29 NOTE — DISCHARGE INSTRUCTIONS
Take both antibiotics as prescribed until they are gone even if your symptoms get better.  You should not have any pills left over.  Follow-up with Podiatry.  Return to the emergency department for any new or worsening symptoms.    Thank you for coming to our Emergency Department today. It is important to remember that some problems are difficult to diagnose and may not be found during your first visit. Be sure to follow up with your primary care doctor.  If you do not have one, you may contact the one listed on your discharge paperwork or you may also call the [facility] Appointment Desk at [phone] to schedule an appointment with one.     Return to the ER with any questions/concerns, new/concerning symptoms, worsening or failure to improve. Do not drive or make any important decisions for 24 hours if you have received any pain medications, sedatives or mood altering drugs during your ER visit.

## 2020-09-29 NOTE — ED NOTES
Pt presented to the ED via pov. Pt c/o left foot pain. Pt states she was walking in her living room when she stepped on a toothpick and it went through her skin. Pt alert. Pt has a hx of DM. Pt has a puncture site to the top of her left foot, red and slightly warm.

## 2021-02-09 ENCOUNTER — OFFICE VISIT (OUTPATIENT)
Dept: URGENT CARE | Facility: CLINIC | Age: 36
End: 2021-02-09
Payer: MEDICAID

## 2021-02-09 VITALS
BODY MASS INDEX: 39.86 KG/M2 | OXYGEN SATURATION: 98 % | WEIGHT: 225 LBS | HEART RATE: 102 BPM | SYSTOLIC BLOOD PRESSURE: 104 MMHG | RESPIRATION RATE: 20 BRPM | TEMPERATURE: 98 F | DIASTOLIC BLOOD PRESSURE: 65 MMHG

## 2021-02-09 DIAGNOSIS — J30.9 ALLERGIC RHINITIS, UNSPECIFIED SEASONALITY, UNSPECIFIED TRIGGER: Primary | ICD-10-CM

## 2021-02-09 DIAGNOSIS — R05.9 COUGH: ICD-10-CM

## 2021-02-09 LAB
CTP QC/QA: YES
SARS-COV-2 RDRP RESP QL NAA+PROBE: NEGATIVE

## 2021-02-09 PROCEDURE — 99203 PR OFFICE/OUTPT VISIT, NEW, LEVL III, 30-44 MIN: ICD-10-PCS | Mod: S$GLB,,, | Performed by: EMERGENCY MEDICINE

## 2021-02-09 PROCEDURE — U0002: ICD-10-PCS | Mod: QW,S$GLB,, | Performed by: EMERGENCY MEDICINE

## 2021-02-09 PROCEDURE — 99203 OFFICE O/P NEW LOW 30 MIN: CPT | Mod: S$GLB,,, | Performed by: EMERGENCY MEDICINE

## 2021-02-09 PROCEDURE — U0002 COVID-19 LAB TEST NON-CDC: HCPCS | Mod: QW,S$GLB,, | Performed by: EMERGENCY MEDICINE

## 2021-02-09 RX ORDER — INSULIN LISPRO 100 [IU]/ML
INJECTION, SOLUTION INTRAVENOUS; SUBCUTANEOUS
COMMUNITY

## 2021-02-09 RX ORDER — ATORVASTATIN CALCIUM 40 MG/1
40 TABLET, FILM COATED ORAL DAILY
COMMUNITY
Start: 2020-12-30

## 2021-02-09 RX ORDER — SITAGLIPTIN 100 MG/1
100 TABLET, FILM COATED ORAL DAILY
COMMUNITY
Start: 2020-12-30

## 2021-02-09 RX ORDER — FLUTICASONE PROPIONATE 50 MCG
2 SPRAY, SUSPENSION (ML) NASAL DAILY
Qty: 16 G | Refills: 0 | Status: SHIPPED | OUTPATIENT
Start: 2021-02-09

## 2021-02-12 ENCOUNTER — TELEPHONE (OUTPATIENT)
Dept: URGENT CARE | Facility: CLINIC | Age: 36
End: 2021-02-12

## 2021-07-24 ENCOUNTER — OFFICE VISIT (OUTPATIENT)
Dept: URGENT CARE | Facility: CLINIC | Age: 36
End: 2021-07-24
Payer: MEDICAID

## 2021-07-24 VITALS
HEIGHT: 63 IN | SYSTOLIC BLOOD PRESSURE: 117 MMHG | WEIGHT: 207 LBS | DIASTOLIC BLOOD PRESSURE: 79 MMHG | BODY MASS INDEX: 36.68 KG/M2 | RESPIRATION RATE: 12 BRPM | TEMPERATURE: 97 F | OXYGEN SATURATION: 95 % | HEART RATE: 106 BPM

## 2021-07-24 DIAGNOSIS — J06.9 VIRAL URI: Primary | ICD-10-CM

## 2021-07-24 DIAGNOSIS — Z20.822 CLOSE EXPOSURE TO COVID-19 VIRUS: ICD-10-CM

## 2021-07-24 DIAGNOSIS — R05.9 COUGH: ICD-10-CM

## 2021-07-24 LAB
CTP QC/QA: YES
SARS-COV-2 RDRP RESP QL NAA+PROBE: NEGATIVE

## 2021-07-24 PROCEDURE — 99213 OFFICE O/P EST LOW 20 MIN: CPT | Mod: S$GLB,CS,, | Performed by: PHYSICIAN ASSISTANT

## 2021-07-24 PROCEDURE — 99213 PR OFFICE/OUTPT VISIT, EST, LEVL III, 20-29 MIN: ICD-10-PCS | Mod: S$GLB,CS,, | Performed by: PHYSICIAN ASSISTANT

## 2021-07-24 PROCEDURE — U0002 COVID-19 LAB TEST NON-CDC: HCPCS | Mod: QW,S$GLB,, | Performed by: PHYSICIAN ASSISTANT

## 2021-07-24 PROCEDURE — U0002: ICD-10-PCS | Mod: QW,S$GLB,, | Performed by: PHYSICIAN ASSISTANT

## 2021-07-24 RX ORDER — PROMETHAZINE HYDROCHLORIDE AND DEXTROMETHORPHAN HYDROBROMIDE 6.25; 15 MG/5ML; MG/5ML
5 SYRUP ORAL EVERY 4 HOURS PRN
Qty: 118 ML | Refills: 0 | Status: SHIPPED | OUTPATIENT
Start: 2021-07-24 | End: 2021-08-03

## 2021-07-24 RX ORDER — BENZONATATE 100 MG/1
100 CAPSULE ORAL 3 TIMES DAILY PRN
Qty: 20 CAPSULE | Refills: 0 | Status: SHIPPED | OUTPATIENT
Start: 2021-07-24 | End: 2021-08-03

## 2021-07-24 RX ORDER — BENZONATATE 100 MG/1
100 CAPSULE ORAL 3 TIMES DAILY PRN
Qty: 20 CAPSULE | Refills: 0 | Status: SHIPPED | OUTPATIENT
Start: 2021-07-24 | End: 2021-07-24

## 2021-07-24 RX ORDER — PROMETHAZINE HYDROCHLORIDE AND DEXTROMETHORPHAN HYDROBROMIDE 6.25; 15 MG/5ML; MG/5ML
5 SYRUP ORAL EVERY 4 HOURS PRN
Qty: 118 ML | Refills: 0 | Status: SHIPPED | OUTPATIENT
Start: 2021-07-24 | End: 2021-07-24

## 2021-09-07 ENCOUNTER — CLINICAL SUPPORT (OUTPATIENT)
Dept: URGENT CARE | Facility: CLINIC | Age: 36
End: 2021-09-07
Payer: MEDICAID

## 2021-09-07 DIAGNOSIS — Z03.818 ENCOUNTER FOR OBSERVATION FOR SUSPECTED EXPOSURE TO OTHER BIOLOGICAL AGENTS RULED OUT: Primary | ICD-10-CM

## 2021-09-07 PROCEDURE — 99211 OFF/OP EST MAY X REQ PHY/QHP: CPT | Mod: S$GLB,,, | Performed by: PHYSICIAN ASSISTANT

## 2021-09-07 PROCEDURE — 99211 PR OFFICE/OUTPT VISIT, EST, LEVL I: ICD-10-PCS | Mod: S$GLB,,, | Performed by: PHYSICIAN ASSISTANT

## 2021-09-07 PROCEDURE — U0005 INFEC AGEN DETEC AMPLI PROBE: HCPCS | Performed by: PHYSICIAN ASSISTANT

## 2021-09-07 PROCEDURE — U0003 INFECTIOUS AGENT DETECTION BY NUCLEIC ACID (DNA OR RNA); SEVERE ACUTE RESPIRATORY SYNDROME CORONAVIRUS 2 (SARS-COV-2) (CORONAVIRUS DISEASE [COVID-19]), AMPLIFIED PROBE TECHNIQUE, MAKING USE OF HIGH THROUGHPUT TECHNOLOGIES AS DESCRIBED BY CMS-2020-01-R: HCPCS | Performed by: PHYSICIAN ASSISTANT

## 2021-09-08 LAB — SARS-COV-2 RNA RESP QL NAA+PROBE: NOT DETECTED

## 2021-12-13 ENCOUNTER — HOSPITAL ENCOUNTER (EMERGENCY)
Facility: HOSPITAL | Age: 36
Discharge: HOME | End: 2021-12-13
Attending: EMERGENCY MEDICINE
Payer: MEDICAID

## 2021-12-13 VITALS
RESPIRATION RATE: 18 BRPM | WEIGHT: 200 LBS | HEART RATE: 98 BPM | TEMPERATURE: 98 F | DIASTOLIC BLOOD PRESSURE: 76 MMHG | BODY MASS INDEX: 35.44 KG/M2 | HEIGHT: 63 IN | OXYGEN SATURATION: 100 % | SYSTOLIC BLOOD PRESSURE: 122 MMHG

## 2021-12-13 DIAGNOSIS — J32.9 SINUSITIS, UNSPECIFIED CHRONICITY, UNSPECIFIED LOCATION: Primary | ICD-10-CM

## 2021-12-13 PROBLEM — E11.9 TYPE 2 DIABETES MELLITUS: Status: ACTIVE | Noted: 2021-12-13

## 2021-12-13 LAB
B-HCG UR QL: NEGATIVE
CTP QC/QA: YES
POC MOLECULAR INFLUENZA A AGN: NEGATIVE
POC MOLECULAR INFLUENZA B AGN: NEGATIVE
SARS-COV-2 RDRP RESP QL NAA+PROBE: NEGATIVE

## 2021-12-13 PROCEDURE — 99284 EMERGENCY DEPT VISIT MOD MDM: CPT | Mod: 25

## 2021-12-13 PROCEDURE — U0002 COVID-19 LAB TEST NON-CDC: HCPCS | Performed by: NURSE PRACTITIONER

## 2021-12-13 PROCEDURE — 87502 INFLUENZA DNA AMP PROBE: CPT

## 2021-12-13 PROCEDURE — 81025 URINE PREGNANCY TEST: CPT | Performed by: NURSE PRACTITIONER

## 2021-12-13 RX ORDER — FLUTICASONE PROPIONATE 50 MCG
1 SPRAY, SUSPENSION (ML) NASAL 2 TIMES DAILY PRN
Qty: 15 G | Refills: 0 | Status: SHIPPED | OUTPATIENT
Start: 2021-12-13

## 2021-12-13 RX ORDER — LORATADINE 10 MG/1
10 TABLET ORAL DAILY
Qty: 7 TABLET | Refills: 0 | Status: SHIPPED | OUTPATIENT
Start: 2021-12-13 | End: 2021-12-20

## 2021-12-13 RX ORDER — OXYMETAZOLINE HCL 0.05 %
1 SPRAY, NON-AEROSOL (ML) NASAL 2 TIMES DAILY
Qty: 15 ML | Refills: 0 | Status: SHIPPED | OUTPATIENT
Start: 2021-12-13 | End: 2021-12-16

## 2021-12-13 RX ORDER — MELOXICAM 7.5 MG/1
7.5 TABLET ORAL DAILY
Qty: 12 TABLET | Refills: 0 | Status: SHIPPED | OUTPATIENT
Start: 2021-12-13

## 2021-12-13 RX ORDER — PEDI MULTIVIT NO.27/FOLIC ACID 100 MCG
2 TABLET,CHEWABLE ORAL EVERY 4 HOURS PRN
Qty: 24 EACH | Refills: 0 | Status: SHIPPED | OUTPATIENT
Start: 2021-12-13 | End: 2021-12-20

## 2022-03-16 ENCOUNTER — HOSPITAL ENCOUNTER (EMERGENCY)
Facility: HOSPITAL | Age: 37
Discharge: HOME | End: 2022-03-16
Attending: EMERGENCY MEDICINE

## 2022-03-16 VITALS
DIASTOLIC BLOOD PRESSURE: 73 MMHG | HEIGHT: 63 IN | BODY MASS INDEX: 35.44 KG/M2 | RESPIRATION RATE: 18 BRPM | HEART RATE: 92 BPM | WEIGHT: 200 LBS | SYSTOLIC BLOOD PRESSURE: 127 MMHG | TEMPERATURE: 98 F | OXYGEN SATURATION: 97 %

## 2022-03-16 DIAGNOSIS — M25.571 ANKLE PAIN, RIGHT: ICD-10-CM

## 2022-03-16 DIAGNOSIS — S82.64XA CLOSED NONDISPLACED FRACTURE OF LATERAL MALLEOLUS OF RIGHT FIBULA, INITIAL ENCOUNTER: Primary | ICD-10-CM
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
## 2022-03-16 DIAGNOSIS — M25.471 ANKLE SWELLING, RIGHT: ICD-10-CM

## 2022-03-16 DIAGNOSIS — R07.81 RIB PAIN ON LEFT SIDE: ICD-10-CM

## 2022-03-16 DIAGNOSIS — M25.532 WRIST PAIN, LEFT: ICD-10-CM

## 2022-03-16 DIAGNOSIS — M79.605 LEFT LEG PAIN: ICD-10-CM

## 2022-03-16 LAB
B-HCG UR QL: NEGATIVE
CTP QC/QA: YES

## 2022-03-16 PROCEDURE — 63600175 PHARM REV CODE 636 W HCPCS: Performed by: NURSE PRACTITIONER

## 2022-03-16 PROCEDURE — 81025 URINE PREGNANCY TEST: CPT | Performed by: NURSE PRACTITIONER

## 2022-03-16 PROCEDURE — 99284 EMERGENCY DEPT VISIT MOD MDM: CPT | Mod: 25

## 2022-03-16 PROCEDURE — 96372 THER/PROPH/DIAG INJ SC/IM: CPT | Performed by: NURSE PRACTITIONER

## 2022-03-16 PROCEDURE — 29515 APPLICATION SHORT LEG SPLINT: CPT | Mod: RT

## 2022-03-16 RX ORDER — KETOROLAC TROMETHAMINE 30 MG/ML
30 INJECTION, SOLUTION INTRAMUSCULAR; INTRAVENOUS
Status: COMPLETED | OUTPATIENT
Start: 2022-03-16 | End: 2022-03-16

## 2022-03-16 RX ORDER — HYDROCODONE BITARTRATE AND ACETAMINOPHEN 5; 325 MG/1; MG/1
1 TABLET ORAL EVERY 6 HOURS PRN
Qty: 12 TABLET | Refills: 0 | Status: SHIPPED | OUTPATIENT
Start: 2022-03-16

## 2022-03-16 RX ADMIN — KETOROLAC TROMETHAMINE 30 MG: 30 INJECTION, SOLUTION INTRAMUSCULAR; INTRAVENOUS at 02:03

## 2022-03-16 NOTE — DISCHARGE INSTRUCTIONS
§ Please return to the Emergency Department for any new or worsening symptoms including: fever, chest pain, shortness of breath, loss of consciousness, dizziness, weakness, or any other concerns.     § Schedule an appointment for follow up with Orthopedics as soon as possible for a recheck of your symptoms. If you do not have one, contact the one listed on your discharge paperwork or call the [facility] Appointment Desk at [phone] to schedule an appointment.     Call#1 Dr. Arriaga  Call#2 Dr. Edmonds  Call #3 [facility] (and your primary care doctor or [facility])    § Please take all medication as prescribed. You have been prescribed Norco (Hydrocodone) for pain. Please do not take this medication while working, drinking alcohol, swimming, or while driving/operating heavy machinery. This medication may cause drowsiness, dizziness, impair judgment, and reduce physical capabilities.You should not drive, operate heavy machinery, or make life changing decisions while taking this medication.  This medication contains Tylenol. Please do not take any additional Tylenol while you are taking this medication.     Please keep your splint on and dry until you are seen by Orthopedics and they tell you that you are OK to remove it. Rest and Elevate the extremity to help reduce swelling and pain. Use the crutches and DO NOT put any weight on the leg until you are cleared by orthopedics and they tell you that you are OK to walk on it.

## 2022-03-16 NOTE — Clinical Note
"Breanna Fitch (Jacquelynn) was seen and treated in our emergency department on 3/16/2022.  She may return to work on 03/21/2022.       If you have any questions or concerns, please don't hesitate to call.       LPN    "

## 2022-03-16 NOTE — ED PROVIDER NOTES
Encounter Date: 3/16/2022    SCRIBE #1 NOTE: I, Omid Kilpatrick, am scribing for, and in the presence of,  PATRICIA Jordan. I have scribed the following portions of the note - Other sections scribed: HPI, ROS.       History     Chief Complaint   Patient presents with    Fall     Pt reports trip and fall an hour PTA. Denies LOC and head trauma. Reporting pain to left shoulder, left ribs, left hip, left knee, left wrist, left ankle, and right ankle. Patient ambulated to triage with steady gait.     36 y.o. female, with a pertinent past medical history of diabetes mellitus presents to the ED with concerns after a fall that occurred 1 hour prior to arrival. Pt states that she was walking out a building and tripped on a ramp curve. Pt denies hitting her head or losing consciousness. Pt now reports associated right ankle pain, left wrist pain, and left shoulder pain. Pt also reports right leg pain, left knee pain, and left-sided rib pain but states that these symptoms are not as painful. Pt is able to ambulate with a steady gait. No other exacerbating or alleviating factors. Patient denies other associated symptoms.       The history is provided by the patient. No  was used.     Review of patient's allergies indicates:  No Known Allergies  Past Medical History:   Diagnosis Date    Diabetes mellitus     Fibroid, uterine     GERD (gastroesophageal reflux disease)     Ovarian cyst      Past Surgical History:   Procedure Laterality Date     SECTION, CLASSIC      x 2    CYST REMOVAL      from both breast x 2     Family History   Problem Relation Age of Onset    Hypertension Mother     Diabetes Father     Sickle cell trait Brother     Diabetes Maternal Grandmother      Social History     Tobacco Use    Smoking status: Never Smoker    Smokeless tobacco: Never Used   Substance Use Topics    Alcohol use: Yes     Comment: occasionally    Drug use: Yes     Types: Marijuana      Comment: occassionally     Review of Systems   Constitutional: Negative for chills and fever.   HENT: Negative for congestion, rhinorrhea and sore throat.    Eyes: Negative for visual disturbance.   Respiratory: Negative for cough and shortness of breath.    Cardiovascular: Negative for chest pain.   Gastrointestinal: Negative for abdominal pain, diarrhea, nausea and vomiting.   Genitourinary: Negative for dysuria, frequency and hematuria.   Musculoskeletal: Negative for back pain.        (+) right ankle pain  (+) left wrist pain  (+) left shoulder pain  (+) right leg pain  (+) left knee pain  (+) left rib pain  (-) head injury   Skin: Negative for rash.   Neurological: Negative for dizziness, syncope, weakness and headaches.       Physical Exam     Initial Vitals [03/16/22 1350]   BP Pulse Resp Temp SpO2   129/81 98 16 98.7 °F (37.1 °C) 99 %      MAP       --         Physical Exam    Nursing note and vitals reviewed.  Constitutional: She appears well-developed and well-nourished. She is not diaphoretic. She is cooperative.  Non-toxic appearance. She does not have a sickly appearance. She does not appear ill. No distress.   HENT:   Head: Normocephalic and atraumatic. Head is without raccoon's eyes and without Starks's sign.   Right Ear: Tympanic membrane and external ear normal. No hemotympanum.   Left Ear: Tympanic membrane and external ear normal. No hemotympanum.   Nose: Nose normal.   Mouth/Throat: Mucous membranes are normal. No trismus in the jaw.   Eyes: Conjunctivae and EOM are normal.   Neck: Phonation normal. Neck supple. No tracheal deviation present.   Normal range of motion.   Full passive range of motion without pain.     Cardiovascular: Normal rate, regular rhythm and intact distal pulses.   Pulses:       Radial pulses are 2+ on the right side and 2+ on the left side.   Pulmonary/Chest: Effort normal and breath sounds normal. No accessory muscle usage or stridor. No tachypnea and no bradypnea. No  respiratory distress. She has no wheezes. She has no rhonchi. She has no rales. She exhibits tenderness. She exhibits no crepitus.     Abdominal: Abdomen is flat. She exhibits no distension. There is no abdominal tenderness.   Musculoskeletal:      Left shoulder: Tenderness present. No swelling or deformity. Normal range of motion.      Left upper arm: No tenderness or bony tenderness.      Left elbow: No swelling. Normal range of motion. No tenderness.      Left forearm: No tenderness or bony tenderness.      Left wrist: Swelling (mild) and tenderness (Dorsal - over ulnar wrist. ) present. No deformity or snuff box tenderness.      Left hand: No tenderness or bony tenderness.      Cervical back: Full passive range of motion without pain, normal range of motion and neck supple. No rigidity, tenderness or bony tenderness. No spinous process tenderness or muscular tenderness.      Thoracic back: No tenderness or bony tenderness.      Lumbar back: No tenderness or bony tenderness.      Right hip: No tenderness.      Left hip: No tenderness.      Right upper leg: No tenderness.      Left upper leg: No tenderness.      Right knee: No swelling or bony tenderness. No tenderness.      Left knee: No swelling or bony tenderness. No tenderness.      Left lower leg: Tenderness (proximal tib/fib) present.      Right ankle: No swelling.      Left ankle: Swelling (Lateral) present. Tenderness present over the lateral malleolus, medial malleolus and base of 5th metatarsal. No proximal fibula tenderness.      Left foot: No swelling.     Neurological: She is alert and oriented to person, place, and time. She has normal strength. No sensory deficit. Coordination and gait normal. GCS score is 15. GCS eye subscore is 4. GCS verbal subscore is 5. GCS motor subscore is 6.   Skin: Skin is warm, dry and intact. Capillary refill takes less than 2 seconds. No bruising and no rash noted. No cyanosis or erythema. Nails show no clubbing.    Psychiatric: She has a normal mood and affect. Her behavior is normal. Judgment and thought content normal.         ED Course   Orthopedic Injury    Date/Time: 3/16/2022 3:32 PM  Performed by: PATRICIA Jordan  Authorized by: Jay Pineda MD     Location procedure was performed:  [facility] EMERGENCY DEPARTMENT  Consent Done?:  Yes  Universal Protocol:     Verbal consent obtained?: Yes      Risks and benefits: Risks, benefits and alternatives were discussed      Consent given by:  Patient    Patient states understanding of procedure being performed: Yes      Patient identity confirmed:  Verbally with patient  Injury:     Injury location:  Ankle    Location details:  Right ankle    Injury type:  Fracture    Fracture type: lateral malleolus      Fracture type: lateral malleolus        Pre-procedure assessment:     Neurovascular status: Neurovascularly intact      Distal perfusion: normal      Neurological function: normal      Range of motion: reduced      Patient sedated?: No        Selections made in this section will also lock the Injury type section above.:     Manipulation performed?: No      Immobilization:  Splint    Splint type:  Short leg    Supplies used:  Ortho-Glass    Complications: No      Estimated blood loss (mL):  0    Specimens: No      Implants: No    Post-procedure assessment:     Neurovascular status: Neurovascularly intact      Distal perfusion: normal      Neurological function: normal      Range of motion: splinted      Patient tolerance:  Patient tolerated the procedure well with no immediate complications     Splint applied by nursing staff.       Labs Reviewed   POCT URINE PREGNANCY          Imaging Results          X-Ray Tibia Fibula 2 View Left (Final result)  Result time 03/16/22 15:16:43    Final result by Man Rodarte MD (03/16/22 15:16:43)                 Impression:      Normal examination.      Electronically signed by: Man Rodarte MD  Date:    03/16/2022  Time:    15:16              Narrative:    EXAMINATION:  XR TIBIA FIBULA 2 VIEW LEFT    CLINICAL HISTORY:  Pain in left leg    TECHNIQUE:  AP and lateral views of the left tibia and fibula were performed.    COMPARISON:  None.    FINDINGS:  No fracture.  No lytic or blastic lesion.  Soft tissues are unremarkable.                               X-Ray Wrist Complete Left (Final result)  Result time 03/16/22 15:17:05    Final result by Man Rodarte MD (03/16/22 15:17:05)                 Impression:      Normal examination.      Electronically signed by: Man Rodarte MD  Date:    03/16/2022  Time:    15:17             Narrative:    EXAMINATION:  XR WRIST COMPLETE 3 VIEWS LEFT    CLINICAL HISTORY:  Pain in left wrist    TECHNIQUE:  PA, lateral, and oblique views of the left wrist were performed.    COMPARISON:  None    FINDINGS:  No fracture or dislocation. Cartilage spaces are maintained. Soft tissues are unremarkable.                               X-Ray Shoulder 2 or More Views Left (Final result)  Result time 03/16/22 15:17:23    Final result by Man Rodarte MD (03/16/22 15:17:23)                 Impression:      Normal examination.      Electronically signed by: Man Rodarte MD  Date:    03/16/2022  Time:    15:17             Narrative:    EXAMINATION:  XR SHOULDER COMPLETE 2 OR MORE VIEWS LEFT    CLINICAL HISTORY:  left shoulder pain;    TECHNIQUE:  Two or three views of the left shoulder were performed.    COMPARISON:  None    FINDINGS:  Bones: No fracture.  No lytic or blastic lesion.    Joints: No evidence for glenohumeral dislocation.  Acromioclavicular joint is unremarkable.    Soft tissues: Unremarkable.                               X-Ray Ankle Complete Right (Final result)  Result time 03/16/22 15:18:43    Final result by Man Rodarte MD (03/16/22 15:18:43)                 Impression:      As above.      Electronically signed by: Man Rodarte MD  Date:    03/16/2022  Time:    15:18             Narrative:     EXAMINATION:  XR ANKLE COMPLETE 3 VIEW RIGHT    CLINICAL HISTORY:  Pain in right ankle and joints of right foot    TECHNIQUE:  AP, lateral, and oblique images of the right ankle were performed.    COMPARISON:  10/22/2012    FINDINGS:  Linear lucency within the lateral malleolus concerning for nondisplaced fracture (Richardson B).  Ankle mortise is symmetric.  Talar dome is maintained.  Soft tissue swelling noted.                               X-Ray Foot Complete Right (Final result)  Result time 03/16/22 15:19:23    Final result by Man Rodarte MD (03/16/22 15:19:23)                 Impression:      As above.      Electronically signed by: Man Rodarte MD  Date:    03/16/2022  Time:    15:19             Narrative:    EXAMINATION:  XR FOOT COMPLETE 3 VIEW RIGHT    CLINICAL HISTORY:  . Effusion, right ankle    TECHNIQUE:  AP, lateral, and oblique views of the right foot were performed.    COMPARISON:  None.    FINDINGS:  No fracture or dislocation.  Lisfranc articulation is congruent.  Hallux valgus noted.  Cartilage spaces are maintained.  Soft tissues are unremarkable.                               X-Ray Ribs 2 View Left (Final result)  Result time 03/16/22 15:20:07    Final result by Man Rodarte MD (03/16/22 15:20:07)                 Impression:      As above.      Electronically signed by: Man Rodarte MD  Date:    03/16/2022  Time:    15:20             Narrative:    EXAMINATION:  XR RIBS 2 VIEW LEFT    CLINICAL HISTORY:  Pleurodynia    TECHNIQUE:  Two views of the left ribs were performed.    COMPARISON:  None.    FINDINGS:  No evidence for left-sided rib fracture.  Left lung is clear.  No evidence for pneumothorax.                                 Medications   ketorolac injection 30 mg (30 mg Intramuscular Given 3/16/22 1430)     Medical Decision Making:   History:   Old Medical Records: I decided to obtain old medical records.       APC / Resident Notes:   This is an evaluation of a 36 y.o. female that  presents to the Emergency Department for injuries following a fall. Physical Exam shows a non-toxic, afebrile, and well appearing female. No sign of head injury or trauma.  No hematoma pain or septal hematoma.  No midline self or crepitus of the cervical, thoracic, lumbar spine.  No muscular tenderness over the back.  No signs of injury.  Tenderness of the left anterior posterior shoulder.  No crepitus or tenderness over the left clavicle.  Tenderness of the left anterior lateral lateral ribs without crepitus or step-off.  No flail segments.  Tenderness palpation of the left dorsal wrist over the ulnar aspect without crepitus.  No snuffbox tenderness. No tenderness proximally or distally.  No tenderness over the elbow.  2+ left radial pulse.  Abdomen is soft and nontender.  There is tenderness palpation over the left proximal tib-fib without tenderness over the knee.  There is tenderness and swelling of the right ankle with no tenderness over the 5th metatarsal.  No tenderness over the right knee. Vital signs are reassuring. If available, previous records reviewed.  RESULTS: UPT negative.  X-ray left tib-fib without acute fracture or dislocation.  Left wrist without acute fracture or dislocation.  Left shoulder without acute fracture or dislocation.  X-ray right foot without acute fracture or dislocation.  X-ray left ribs without acute fracture or dislocation.  X-ray right ankle with linear lucency within lateral malleolus concerning for nondisplaced Richardson B fracture.    My overall impression is fracture lateral malleolus following a fall. I considered, but at this time, do not suspect pneumothorax, acute displaced rib fracture, shoulder fracture, foot fracture, shoulder fracture, tib-fib fracture, intra-abdominal injury, intracranial injury.    ED Course:  Short-leg posterior splint with nonweightbearing instructions and crutches. Discharge Meds/Instructions:  Fracture instructions, splint instructions,  nonweightbearing instructions, orthopedic follow-up instructions. [facility] referral faxed. The diagnosis, treatment plan, instructions for follow-up as well as ED return precautions were discussed. All questions have been answered.  VI Hawthorne, YINGP-C     Scribe Attestation:   Scribe #1: I performed the above scribed service and the documentation accurately describes the services I performed. I attest to the accuracy of the note.                 Clinical Impression:   Final diagnoses:  [W19.XXXA] Fall, initial encounter  [M79.605] Left leg pain  [M25.532] Wrist pain, left  [M25.571] Ankle pain, right  [M25.471] Ankle swelling, right  [R07.81] Rib pain on left side  [S82.64XA] Closed nondisplaced fracture of lateral malleolus of right fibula, initial encounter (Primary)          ED Disposition Condition    Discharge Stable        ED Prescriptions     Medication Sig Dispense Start Date End Date Auth. Provider    HYDROcodone-acetaminophen (NORCO) 5-325 mg per tablet Take 1 tablet by mouth every 6 (six) hours as needed for Pain. 12 tablet 3/16/2022  PATRICIA Jordan        Follow-up Information     Follow up With Specialties Details Why Contact Info    Constantine Arriaga MD Orthopedic Surgery Call in 1 day To discuss your ED visit & schedule follow-up [address]  [address]  [place] [address]  [phone]      Rk Edmonds MD Orthopedic Surgery Call in 1 day to schedule an appointment for followup with Orthopedics [address]  [address]  [facility] [address]  [phone]      [facility] Surgical Oncology, Orthopedic Surgery, Genetics, Physical Medicine and Rehabilitation, Occupational Therapy, Radiology Schedule an appointment as soon as possible for a visit   [address]  [place] [address]  [phone]      [facility]  Schedule an appointment as soon as possible for a visit  For Follow-Up, This Week, If you do not have a Primary Care Doctor [address]  [facility] [address]  [phone]      [facility] Emergency Medicine Go to  If symptoms worsen [address]  [facility] [address] [phone]       I, VI Hawthorne, FNP-C, personally performed the services described in this documentation. All medical record entries made by the scribe were at my direction and in my presence. I have reviewed the chart and agree that the record reflects my personal performance and is accurate and complete.       Benny Davila, PATRICIA  03/16/22 1958

## 2022-03-16 NOTE — ED NOTES
Fall (Pt reports trip and fall an hour PTA. Denies LOC and head trauma. Reporting pain to left shoulder, left ribs, left hip, left knee, left wrist, left ankle, and right ankle. Patient ambulated to triage with steady gait.) no noticeable swelling to the left wrist but very tender to the touch.

## 2022-08-31 ENCOUNTER — NURSE TRIAGE (OUTPATIENT)
Dept: ADMINISTRATIVE | Facility: CLINIC | Age: 37
End: 2022-08-31
Payer: MEDICAID

## 2022-09-01 NOTE — TELEPHONE ENCOUNTER
Patient is c/o headache over right eye and dizziness. Also she has sob with movement and upper back pain.  No PCP in system for routing    Reason for Disposition   Patient sounds very sick or weak to the triager    Additional Information   Negative: SEVERE difficulty breathing (e.g., struggling for each breath, speaks in single words)   Negative: Difficult to awaken or acting confused (e.g., disoriented, slurred speech)   Negative: Bluish (or gray) lips or face now   Negative: Shock suspected (e.g., cold/pale/clammy skin, too weak to stand, low BP, rapid pulse)   Negative: Sounds like a life-threatening emergency to the triager   Negative: [1] Diagnosed or suspected COVID-19 AND [2] symptoms lasting 3 or more weeks   Negative: [1] COVID-19 exposure AND [2] NO symptoms   Negative: COVID-19 vaccine reaction suspected (e.g., fever, headache, muscle aches) occurring 1 to 3 days after getting vaccine   Negative: COVID-19 vaccine, questions about   Negative: [1] Lives with someone known to have influenza (flu test positive) AND [2] flu-like symptoms (e.g., cough, runny nose, sore throat, SOB; with or without fever)   Negative: [1] Adult with possible COVID-19 symptoms AND [2] triager concerned about severity of symptoms or other causes   Negative: COVID-19 and breastfeeding, questions about   Negative: SEVERE or constant chest pain or pressure  (Exception: Mild central chest pain, present only when coughing.)   Negative: MODERATE difficulty breathing (e.g., speaks in phrases, SOB even at rest, pulse 100-120)   Negative: [1] Headache AND [2] stiff neck (can't touch chin to chest)   Negative: Oxygen level (e.g., pulse oximetry) 90 percent or lower   Negative: Chest pain or pressure    Protocols used: Coronavirus (COVID-19) Diagnosed or [id number]

## 2023-02-20 ENCOUNTER — HOSPITAL ENCOUNTER (EMERGENCY)
Facility: HOSPITAL | Age: 38
Discharge: HOME | End: 2023-02-20
Attending: EMERGENCY MEDICINE
Payer: MEDICAID

## 2023-02-20 VITALS
DIASTOLIC BLOOD PRESSURE: 79 MMHG | OXYGEN SATURATION: 99 % | BODY MASS INDEX: 33.63 KG/M2 | RESPIRATION RATE: 18 BRPM | SYSTOLIC BLOOD PRESSURE: 119 MMHG | WEIGHT: 197 LBS | HEART RATE: 95 BPM | TEMPERATURE: 98 F | HEIGHT: 64 IN

## 2023-02-20 DIAGNOSIS — L02.411 ABSCESS OF RIGHT AXILLA: Primary | ICD-10-CM

## 2023-02-20 DIAGNOSIS — Z76.89 ENCOUNTER FOR INCISION AND DRAINAGE PROCEDURE: ICD-10-CM

## 2023-02-20 LAB
B-HCG UR QL: NEGATIVE
CTP QC/QA: YES

## 2023-02-20 PROCEDURE — 25000003 PHARM REV CODE 250: Performed by: NURSE PRACTITIONER

## 2023-02-20 PROCEDURE — 99284 EMERGENCY DEPT VISIT MOD MDM: CPT | Mod: 25

## 2023-02-20 PROCEDURE — 25000003 PHARM REV CODE 250

## 2023-02-20 PROCEDURE — 81025 URINE PREGNANCY TEST: CPT

## 2023-02-20 PROCEDURE — 10060 I&D ABSCESS SIMPLE/SINGLE: CPT

## 2023-02-20 RX ORDER — ACETAMINOPHEN 500 MG
500 TABLET ORAL EVERY 6 HOURS PRN
Qty: 20 TABLET | Refills: 0 | Status: SHIPPED | OUTPATIENT
Start: 2023-02-20

## 2023-02-20 RX ORDER — IBUPROFEN 600 MG/1
600 TABLET ORAL
Status: COMPLETED | OUTPATIENT
Start: 2023-02-20 | End: 2023-02-20

## 2023-02-20 RX ORDER — IBUPROFEN 600 MG/1
600 TABLET ORAL EVERY 6 HOURS PRN
Qty: 20 TABLET | Refills: 0 | Status: SHIPPED | OUTPATIENT
Start: 2023-02-20

## 2023-02-20 RX ORDER — LIDOCAINE HYDROCHLORIDE 10 MG/ML
10 INJECTION INFILTRATION; PERINEURAL ONCE
Status: COMPLETED | OUTPATIENT
Start: 2023-02-20 | End: 2023-02-20

## 2023-02-20 RX ORDER — SULFAMETHOXAZOLE AND TRIMETHOPRIM 800; 160 MG/1; MG/1
1 TABLET ORAL 2 TIMES DAILY
Qty: 14 TABLET | Refills: 0 | Status: SHIPPED | OUTPATIENT
Start: 2023-02-20 | End: 2023-02-27

## 2023-02-20 RX ADMIN — IBUPROFEN 600 MG: 600 TABLET ORAL at 03:02

## 2023-02-20 RX ADMIN — LIDOCAINE HYDROCHLORIDE 10 ML: 10 INJECTION, SOLUTION INFILTRATION; PERINEURAL at 03:02

## 2023-02-20 NOTE — ED PROVIDER NOTES
Encounter Date: 2023    SCRIBE #1 NOTE: I, Stefani Anderson, am scribing for, and in the presence of,  Alayna Holdsworth, PA-C. I have scribed the following portions of the note - Other sections scribed: HPI, ROS.     History     Chief Complaint   Patient presents with    Abscess     Pt states that she has an abscess in R armpit that is reoccurring, she noticed the flair up x2 days ago no drainage.      37 y.o. female, with a past medical history of DM and abscesses, who presents to the ED with a constant aching abscess to the right underarm that began 3 days ago. Patient states the pain is 8/10 and denies drainage. Patient has associated symptoms of chills and tingling to the right arm. No attempted Tx. Pain is exacerbated with touch to abscess. No other exacerbating or alleviating factors. Patient denies fever, shortness of breath, nausea, vomiting, or other associated symptoms. No known allergies.       The history is provided by the patient. No  was used.   Review of patient's allergies indicates:  No Known Allergies  Past Medical History:   Diagnosis Date    Diabetes mellitus     Fibroid, uterine     GERD (gastroesophageal reflux disease)     Ovarian cyst      Past Surgical History:   Procedure Laterality Date     SECTION, CLASSIC      x 2    CYST REMOVAL      from both breast x 2     Family History   Problem Relation Age of Onset    Hypertension Mother     Diabetes Father     Sickle cell trait Brother     Diabetes Maternal Grandmother      Social History     Tobacco Use    Smoking status: Never    Smokeless tobacco: Never   Substance Use Topics    Alcohol use: Yes     Comment: occasionally    Drug use: Yes     Types: Marijuana     Comment: occassionally     Review of Systems   Constitutional:  Positive for chills. Negative for diaphoresis and fever.   Respiratory:  Negative for shortness of breath.    Cardiovascular:  Negative for chest pain.   Gastrointestinal:  Negative for  abdominal pain, diarrhea, nausea and vomiting.   Genitourinary:  Negative for decreased urine volume, difficulty urinating, dysuria, frequency, hematuria and urgency.   Musculoskeletal:  Negative for arthralgias and myalgias.   Skin:  Positive for wound (Abscess to Right armpit).        (-) pus discharge   Neurological:  Negative for numbness and headaches.        (+) Tingling to right arm      Physical Exam     Initial Vitals [02/20/23 1310]   BP Pulse Resp Temp SpO2   123/76 98 18 98.2 °F (36.8 °C) 98 %      MAP       --         Physical Exam    Nursing note and vitals reviewed.  Constitutional: Vital signs are normal. She appears well-developed and well-nourished. She is not diaphoretic. She is active. She does not appear ill. No distress.   HENT:   Head: Normocephalic and atraumatic.   Right Ear: External ear normal.   Left Ear: External ear normal.   Nose: Nose normal.   Eyes: Conjunctivae, EOM and lids are normal. Pupils are equal, round, and reactive to light. Right eye exhibits no discharge. Left eye exhibits no discharge.   Neck: Neck supple.   Normal range of motion.   Full passive range of motion without pain.     Cardiovascular:  Normal rate and regular rhythm.           Pulmonary/Chest: Effort normal and breath sounds normal. No respiratory distress.   Abdominal: She exhibits no distension.   Musculoskeletal:         General: Normal range of motion.      Cervical back: Full passive range of motion without pain, normal range of motion and neck supple.     Neurological: She is alert and oriented to person, place, and time. She has normal strength. No sensory deficit. GCS eye subscore is 4. GCS verbal subscore is 5. GCS motor subscore is 6.   Skin: Skin is dry. Capillary refill takes less than 2 seconds. Abscess (2 cm right axilla) noted.       ED Course   I & D - Incision and Drainage    Date/Time: 2/20/2023 10:41 PM  Location procedure was performed: [facility] EMERGENCY DEPARTMENT  Performed by: Nisha  Holdsworth, PA-C  Authorized by: Yaron Burr MD   Type: abscess  Body area: upper extremity (Right axilla)  Anesthesia: local infiltration    Anesthesia:  Local Anesthetic: lidocaine 1% without epinephrine  Scalpel size: 11  Incision type: single straight  Complexity: simple  Drainage: bloody and purulent  Drainage amount: moderate  Wound treatment: incision, drainage, deloculation, expression of material and wound packed  Patient tolerance: Patient tolerated the procedure well with no immediate complications      Labs Reviewed   POCT URINE PREGNANCY          Imaging Results    None          Medications   LIDOcaine HCL 10 mg/ml (1%) injection 10 mL (10 mLs Other Given 2/20/23 1531)   ibuprofen tablet 600 mg (600 mg Oral Given 2/20/23 1531)     Medical Decision Making:   Initial Assessment:   37 y.o. female, with a past medical history of DM and abscesses, who presents to the ED with a constant aching abscess to the right underarm.  Patient's chart and medical history reviewed.  Differential Diagnosis:   Abscess  Cyst  Cellulitis  Hydradenitis suppurativa   Clinical Tests:   Lab Tests: Ordered and Reviewed  ED Management:  Patient's vitals reviewed.  She is afebrile, no respiratory distress, nontoxic-appearing in the ED. Patient had a 2cm right axilla abscess. UPT was negative. Patient given motrin for pain.  Bedside ultrasound showed a pocket of fluid.  I&D performed with pus and bloody discharge, wound was packed.  Nonadherent bandage applied.  Patient tolerated well.  Instructed patient she will need to return to ED in 2-3 days to have packing removed, she verbalized understanding.  Patient will be sent home with Bactrim for prophylactic treatment of infection.  Offered to give a referral to General surgery, she refused.  Patient will also be sent home on Motrin and Tylenol as needed for pain. Patient agrees with this plan. Discussed with her strict return precautions, she verbalized understanding.  Patient is stable for discharge.           Scribe Attestation:   Scribe #1: I performed the above scribed service and the documentation accurately describes the services I performed. I attest to the accuracy of the note.                   Clinical Impression:   Final diagnoses:  [L02.411] Abscess of right axilla (Primary)  [Z76.89] Encounter for incision and drainage procedure        ED Disposition Condition    Discharge Stable          ED Prescriptions       Medication Sig Dispense Start Date End Date Auth. Provider    ibuprofen (ADVIL,MOTRIN) 600 MG tablet Take 1 tablet (600 mg total) by mouth every 6 (six) hours as needed for Pain. 20 tablet 2/20/2023 -- Alayna Holdsworth, PA-C    acetaminophen (TYLENOL) 500 MG tablet Take 1 tablet (500 mg total) by mouth every 6 (six) hours as needed for Temperature greater than or Pain. 20 tablet 2/20/2023 -- Alayna Holdsworth, PA-C    sulfamethoxazole-trimethoprim 800-160mg (BACTRIM DS) 800-160 mg Tab Take 1 tablet by mouth 2 (two) times daily. for 7 days 14 tablet 2/20/2023 2/27/2023 Alayna Holdsworth, PA-C          Follow-up Information       Follow up With Specialties Details Why Contact Info    sObaldo Burgess NP Internal Medicine In 2 days Packing removal [address] [facility] [id number] [phone]      [facility] - Emergency Dept Emergency Medicine   [address] [facility] [address] [phone]        I, Alayna Holdsworth,PA-C, personally performed the services described in this documentation. All medical record entries made by the scribe were at my direction and in my presence. I have reviewed the chart and agree that the record reflects my personal performance and is accurate and complete.       Alayna Holdsworth, PA-C  02/20/23 1380

## 2023-02-20 NOTE — ED TRIAGE NOTES
Abscess (Pt states that she has an abscess in R armpit that is reoccurring, she noticed the flair up x2 days ago no drainage. )

## 2023-02-20 NOTE — FIRST PROVIDER EVALUATION
Emergency Department TeleTriage Encounter Note      CHIEF COMPLAINT    Chief Complaint   Patient presents with    Abscess     Pt states that she has an abscess in R armpit that is reoccurring, she noticed the flair up x2 days ago no drainage.        VITAL SIGNS   Initial Vitals [02/20/23 1310]   BP Pulse Resp Temp SpO2   123/76 98 18 98.2 °F (36.8 °C) 98 %      MAP       --            ALLERGIES    Review of patient's allergies indicates:  No Known Allergies    PROVIDER TRIAGE NOTE  This is a teletriage evaluation of a 37 y.o. female presenting to the ED complaining of abscess to right axilla. Has had recurrent abscess requiring I&D.      Well-appearing, no distress.     Initial orders will be placed and care will be transferred to an alternate provider when patient is roomed for a full evaluation. Any additional orders and the final disposition will be determined by that provider.         ORDERS  Labs Reviewed - No data to display    ED Orders (720h ago, onward)      Start Ordered     Status Ordering Provider    02/20/23 1430 02/20/23 1327  LIDOcaine HCL 10 mg/ml (1%) injection 10 mL  Once         Ordered SETH RODRIGUEZ.    02/20/23 1328 02/20/23 1327  I&D Tray to bedside  Once         Ordered SETH RODRIGUEZ              Virtual Visit Note: The provider triage portion of this emergency department evaluation and documentation was performed via [employer], a HIPAA-compliant telemedicine application, in concert with a tele-presenter in the room. A face to face patient evaluation with one of my colleagues will occur once the patient is placed in an emergency department room.      DISCLAIMER: This note was prepared with [employer] voice recognition transcription software. Garbled syntax, mangled pronouns, and other bizarre constructions may be attributed to that software system.

## 2023-02-20 NOTE — DISCHARGE INSTRUCTIONS
Thank you for coming to our Emergency Department today. It is important to remember that some problems are difficult to diagnose and may not be found during your first visit. Be sure to follow up with your primary care doctor and review any labs/imaging that was performed with them. If you do not have a primary care doctor, you may contact the one listed on your discharge paperwork or you may also call the [facility] Appointment Desk at [phone] to schedule an appointment with one.     All medications may potentially have side effects and it is impossible to predict which medications may give you side effects. If you feel that you are having a negative effect of any medication you should immediately stop taking them and seek medical attention.    Return to the ER with any questions/concerns, new/concerning symptoms, worsening or failure to improve. Do not drive or make any important decisions for 24 hours if you have received any pain medications, sedatives or mood altering drugs during your ER visit.

## 2023-11-07 ENCOUNTER — HOSPITAL ENCOUNTER (EMERGENCY)
Facility: HOSPITAL | Age: 38
Discharge: HOME | End: 2023-11-07
Attending: EMERGENCY MEDICINE
Payer: MEDICAID

## 2023-11-07 VITALS
RESPIRATION RATE: 18 BRPM | WEIGHT: 205 LBS | TEMPERATURE: 99 F | SYSTOLIC BLOOD PRESSURE: 117 MMHG | HEIGHT: 64 IN | HEART RATE: 93 BPM | DIASTOLIC BLOOD PRESSURE: 72 MMHG | BODY MASS INDEX: 35 KG/M2 | OXYGEN SATURATION: 98 %

## 2023-11-07 DIAGNOSIS — L03.011 CELLULITIS OF RIGHT MIDDLE FINGER: Primary | ICD-10-CM

## 2023-11-07 LAB
B-HCG UR QL: NEGATIVE
CTP QC/QA: YES

## 2023-11-07 PROCEDURE — 81025 URINE PREGNANCY TEST: CPT | Performed by: EMERGENCY MEDICINE

## 2023-11-07 PROCEDURE — 25000003 PHARM REV CODE 250: Performed by: PHYSICIAN ASSISTANT

## 2023-11-07 PROCEDURE — 99284 EMERGENCY DEPT VISIT MOD MDM: CPT

## 2023-11-07 RX ORDER — MELOXICAM 7.5 MG/1
7.5 TABLET ORAL DAILY
Qty: 12 TABLET | Refills: 0 | Status: SHIPPED | OUTPATIENT
Start: 2023-11-07

## 2023-11-07 RX ORDER — MUPIROCIN 20 MG/G
OINTMENT TOPICAL 2 TIMES DAILY
Qty: 22 G | Refills: 0 | Status: SHIPPED | OUTPATIENT
Start: 2023-11-07 | End: 2023-11-17

## 2023-11-07 RX ORDER — SULFAMETHOXAZOLE AND TRIMETHOPRIM 800; 160 MG/1; MG/1
1 TABLET ORAL 2 TIMES DAILY
Qty: 20 TABLET | Refills: 0 | Status: SHIPPED | OUTPATIENT
Start: 2023-11-07 | End: 2023-11-17

## 2023-11-07 RX ORDER — SULFAMETHOXAZOLE AND TRIMETHOPRIM 800; 160 MG/1; MG/1
1 TABLET ORAL
Status: COMPLETED | OUTPATIENT
Start: 2023-11-07 | End: 2023-11-07

## 2023-11-07 RX ADMIN — SULFAMETHOXAZOLE AND TRIMETHOPRIM 1 TABLET: 800; 160 TABLET ORAL at 10:11

## 2023-11-07 NOTE — DISCHARGE INSTRUCTIONS
Thank you for coming to our Emergency Department today. It is important to remember that some problems or medical conditions are difficult to diagnose and may not be found or addressed during your Emergency Department visit.  These conditions often start with non-specific symptoms and can only be diagnosed on follow up visits with your primary care physician or specialist when the symptoms continue or change. Please remember that all medical conditions can change, and we cannot predict how you will be feeling tomorrow or the next day. Return to the ER with any questions/concerns, new/concerning symptoms, worsening or failure to improve.       Be sure to follow up with your primary care doctor and review all labs/imaging/tests that were performed during your ER visit with them. It is very common for us to identify non-emergent incidental findings which must be followed up with your primary care physician.  Some labs/imaging/tests may be outside of the normal range, and require non-emergent follow-up and/or further investigation/treatment/procedures/testing to help diagnose/exclude/prevent complications or other potentially serious medical conditions. Some abnormalities may not have been discussed or addressed during your ER visit.     An ER visit does not replace a primary care visit, and many screening tests or follow-up tests cannot be ordered by an ER doctor or performed by the ER. Some tests may even require pre-approval.    If you do not have a primary care doctor, you may contact the one listed on your discharge paperwork or you may also call the [facility] Appointment Desk at [phone] , or [address] [place] [address] at  [phone] to schedule an appointment, or establish care with a primary care doctor or even a specialist and to obtain information about local resources. It is important to your health that you have a primary care doctor.    Please take all medications as directed. We have done our best to select  a medication for you that will treat your condition however, all medications may potentially have side-effects and it is impossible to predict which medications may give you side-effects or what those side-effects (if any) those medications may give you.  If you feel that you are having a negative effect or side-effect of any medication you should stop taking those medications immediately and seek medical attention. If you feel that you are having a life-threatening reaction call 911.        Do not drive, swim, climb to height, take a bath, operate heavy machinery, drink alcohol or take potentially sedating medications, sign any legal documents or make any important decisions for 24 hours if you have received any pain medications, sedatives or mood altering drugs during your ER visit or within 24 hours of taking them if they have been prescribed to you.     You can find additional resources for Dentists, hearing aids, durable medical equipment, low cost pharmacies and other resources at [email or web address]

## 2023-11-07 NOTE — ED PROVIDER NOTES
"Encounter Date: 2023    SCRIBE #1 NOTE: I, Alejandra Felix, am scribing for, and in the presence of,  Judd Villar PA-C. I have scribed the following portions of the note - Other sections scribed: HPI, ROS.       History     Chief Complaint   Patient presents with    Finger Pain     Pt reports pain and swelling to tip of right middle finger x2 days. Pt states "I think I have a hang nail". Pt denies taking any medications for the pain today.     This is a 38 year old female, with a PMHx of DM and GERD, who presents to the ED complaining of Right Middle Finger Pain, symptoms onset two days ago. Patient states that she bites her nails and recently had a new set of nails installed. No other alleviating or exacerbating factors. Patient denies fever, drainage, or other associated symptoms. Patient did not attempt treatment/medications pta. This is the extent of the patient's complaints in the ED. NKDA.                     The history is provided by the patient. No  was used.     Review of patient's allergies indicates:  No Known Allergies  Past Medical History:   Diagnosis Date    Diabetes mellitus     Fibroid, uterine     GERD (gastroesophageal reflux disease)     Ovarian cyst      Past Surgical History:   Procedure Laterality Date     SECTION, CLASSIC      x 2    CYST REMOVAL      from both breast x 2     Family History   Problem Relation Age of Onset    Hypertension Mother     Diabetes Father     Sickle cell trait Brother     Diabetes Maternal Grandmother      Social History     Tobacco Use    Smoking status: Never    Smokeless tobacco: Never   Substance Use Topics    Alcohol use: Yes     Comment: occasionally    Drug use: Yes     Types: Marijuana     Comment: occassionally     Review of Systems   Constitutional:  Negative for fever.   HENT:  Negative for trouble swallowing.    Eyes:  Negative for visual disturbance.   Respiratory:  Negative for cough and shortness of breath.  "   Cardiovascular:  Negative for chest pain.   Gastrointestinal:  Negative for abdominal pain.   Genitourinary:  Negative for difficulty urinating.   Musculoskeletal:  Negative for joint swelling.        (+) Right Finger Pain    Skin:  Negative for color change.        (-) Drainage    Neurological:  Negative for dizziness and headaches.   All other systems reviewed and are negative.      Physical Exam     Initial Vitals [11/07/23 0902]   BP Pulse Resp Temp SpO2   117/72 93 18 98.5 °F (36.9 °C) 98 %      MAP       --         Physical Exam    Nursing note and vitals reviewed.  Constitutional: She appears well-developed and well-nourished. She is not diaphoretic. No distress.   HENT:   Head: Atraumatic.   Right Ear: External ear normal.   Left Ear: External ear normal.   Eyes: Conjunctivae and EOM are normal.   Neck: No tracheal deviation present.   Normal range of motion.  Cardiovascular:  Normal rate and regular rhythm.           Pulmonary/Chest: No accessory muscle usage or stridor. No tachypnea. No respiratory distress.   Musculoskeletal:         General: Normal range of motion.      Cervical back: Normal range of motion.      Comments: Very small area of erythema with mild edema to the ulnar surface of the right middle finger nail bed without nail bed disruption or active drainage.  No fluctuance or induration.  Focal tenderness.  Capillary refill less 2 seconds.  Sensation intact and equal to both radial and ulnar surfaces.  Full ROM of joints.     Neurological: She is alert and oriented to person, place, and time. She displays no tremor. She displays no seizure activity. Coordination and gait normal.   Skin: Skin is intact. Capillary refill takes less than 2 seconds. No rash noted. No erythema.         ED Course   Procedures  Labs Reviewed   POCT URINE PREGNANCY          Imaging Results    None          Medications   sulfamethoxazole-trimethoprim 800-160mg per tablet 1 tablet (1 tablet Oral Given 11/7/23 1030)      Medical Decision Making  Right middle finger cellulitis.  No discernible abscess at this time.  I do offer empiric I&D attempt versus antibiotic therapy alone in conjunction with warm compresses; would prefer antibiotic therapy alone.  No joint involvement, or flexor tenosynovitis, or systemic symptoms.    Amount and/or Complexity of Data Reviewed  Labs: ordered.    Risk  Prescription drug management.            Scribe Attestation:   Scribe #1: I performed the above scribed service and the documentation accurately describes the services I performed. I attest to the accuracy of the note.                I, Judd Villar PA-C, personally performed the services described in this documentation. All medical record entries made by the scribe were at my direction and in my presence.  I have reviewed the chart and agree that the record reflects my personal performance and is accurate and complete.       Clinical Impression:   Final diagnoses:  [L03.011] Cellulitis of right middle finger (Primary)        ED Disposition Condition    Discharge Stable          ED Prescriptions       Medication Sig Dispense Start Date End Date Auth. Provider    sulfamethoxazole-trimethoprim 800-160mg (BACTRIM DS) 800-160 mg Tab Take 1 tablet by mouth 2 (two) times daily. for 10 days 20 tablet 11/7/2023 11/17/2023 Judd Villar PA-C    mupirocin (BACTROBAN) 2 % ointment Apply topically 2 (two) times daily. for 10 days 22 g 11/7/2023 11/17/2023 Judd Villar PA-C    meloxicam (MOBIC) 7.5 MG tablet Take 1 tablet (7.5 mg total) by mouth once daily. 12 tablet 11/7/2023 -- Judd Villar PA-C          Follow-up Information       Follow up With Specialties Details Why Contact Info    Osbaldo Burgess NP Internal Medicine Schedule an appointment as soon as possible for a visit in 1 day For re-evaluation [facility] [facility]  [place] [id number] [phone]      [facility] - Emergency Dept Emergency Medicine Go to  If symptoms worsen or  new symptoms develop [facility][note 3] [address] [facility] [place] [id number] [phone]             Judd Villar PAMalikC  11/07/23 1103

## 2023-11-10 ENCOUNTER — HOSPITAL ENCOUNTER (EMERGENCY)
Facility: HOSPITAL | Age: 38
Discharge: HOME | End: 2023-11-11
Attending: EMERGENCY MEDICINE
Payer: MEDICAID

## 2023-11-10 DIAGNOSIS — R42 DIZZINESS: Primary | ICD-10-CM

## 2023-11-10 LAB
ALBUMIN SERPL BCP-MCNC: 3.5 G/DL (ref 3.5–5.2)
ALP SERPL-CCNC: 103 U/L (ref 55–135)
ALT SERPL W/O P-5'-P-CCNC: 9 U/L (ref 10–44)
ANION GAP SERPL CALC-SCNC: 10 MMOL/L (ref 8–16)
AST SERPL-CCNC: 8 U/L (ref 10–40)
BACTERIA #/AREA URNS HPF: NORMAL /HPF
BASOPHILS # BLD AUTO: 0.03 K/UL (ref 0–0.2)
BASOPHILS NFR BLD: 0.3 % (ref 0–1.9)
BILIRUB SERPL-MCNC: 0.5 MG/DL (ref 0.1–1)
BILIRUB UR QL STRIP: NEGATIVE
BUN SERPL-MCNC: 9 MG/DL (ref 6–20)
CALCIUM SERPL-MCNC: 9.6 MG/DL (ref 8.7–10.5)
CHLORIDE SERPL-SCNC: 104 MMOL/L (ref 95–110)
CLARITY UR: ABNORMAL
CO2 SERPL-SCNC: 24 MMOL/L (ref 23–29)
COLOR UR: YELLOW
CREAT SERPL-MCNC: 0.8 MG/DL (ref 0.5–1.4)
DIFFERENTIAL METHOD: NORMAL
EOSINOPHIL # BLD AUTO: 0.1 K/UL (ref 0–0.5)
EOSINOPHIL NFR BLD: 1.1 % (ref 0–8)
ERYTHROCYTE [DISTWIDTH] IN BLOOD BY AUTOMATED COUNT: 11.9 % (ref 11.5–14.5)
EST. GFR  (NO RACE VARIABLE): >60 ML/MIN/1.73 M^2
GLUCOSE SERPL-MCNC: 153 MG/DL (ref 70–110)
GLUCOSE UR QL STRIP: NEGATIVE
HCT VFR BLD AUTO: 39.8 % (ref 37–48.5)
HGB BLD-MCNC: 13.2 G/DL (ref 12–16)
HGB UR QL STRIP: NEGATIVE
IMM GRANULOCYTES # BLD AUTO: 0.02 K/UL (ref 0–0.04)
IMM GRANULOCYTES NFR BLD AUTO: 0.2 % (ref 0–0.5)
KETONES UR QL STRIP: NEGATIVE
LEUKOCYTE ESTERASE UR QL STRIP: ABNORMAL
LYMPHOCYTES # BLD AUTO: 3.7 K/UL (ref 1–4.8)
LYMPHOCYTES NFR BLD: 40.6 % (ref 18–48)
MAGNESIUM SERPL-MCNC: 1.8 MG/DL (ref 1.6–2.6)
MCH RBC QN AUTO: 28.3 PG (ref 27–31)
MCHC RBC AUTO-ENTMCNC: 33.2 G/DL (ref 32–36)
MCV RBC AUTO: 85 FL (ref 82–98)
MICROSCOPIC COMMENT: NORMAL
MONOCYTES # BLD AUTO: 0.5 K/UL (ref 0.3–1)
MONOCYTES NFR BLD: 5.6 % (ref 4–15)
NEUTROPHILS # BLD AUTO: 4.7 K/UL (ref 1.8–7.7)
NEUTROPHILS NFR BLD: 52.2 % (ref 38–73)
NITRITE UR QL STRIP: NEGATIVE
NRBC BLD-RTO: 0 /100 WBC
PH UR STRIP: 6 [PH] (ref 5–8)
PLATELET # BLD AUTO: 255 K/UL (ref 150–450)
PMV BLD AUTO: 9.7 FL (ref 9.2–12.9)
POCT GLUCOSE: 128 MG/DL (ref 70–110)
POTASSIUM SERPL-SCNC: 3.7 MMOL/L (ref 3.5–5.1)
PROT SERPL-MCNC: 7.7 G/DL (ref 6–8.4)
PROT UR QL STRIP: ABNORMAL
RBC # BLD AUTO: 4.66 M/UL (ref 4–5.4)
SODIUM SERPL-SCNC: 138 MMOL/L (ref 136–145)
SP GR UR STRIP: 1.02 (ref 1–1.03)
SQUAMOUS #/AREA URNS HPF: 11 /HPF
TROPONIN I SERPL DL<=0.01 NG/ML-MCNC: 0.01 NG/ML (ref 0–0.03)
URN SPEC COLLECT METH UR: ABNORMAL
UROBILINOGEN UR STRIP-ACNC: NEGATIVE EU/DL
WBC # BLD AUTO: 8.98 K/UL (ref 3.9–12.7)
WBC #/AREA URNS HPF: 5 /HPF (ref 0–5)

## 2023-11-10 PROCEDURE — 96361 HYDRATE IV INFUSION ADD-ON: CPT

## 2023-11-10 PROCEDURE — 80053 COMPREHEN METABOLIC PANEL: CPT | Performed by: PHYSICIAN ASSISTANT

## 2023-11-10 PROCEDURE — 96360 HYDRATION IV INFUSION INIT: CPT

## 2023-11-10 PROCEDURE — 93010 ELECTROCARDIOGRAM REPORT: CPT | Mod: ,,, | Performed by: INTERNAL MEDICINE

## 2023-11-10 PROCEDURE — 84484 ASSAY OF TROPONIN QUANT: CPT | Performed by: PHYSICIAN ASSISTANT

## 2023-11-10 PROCEDURE — 93005 ELECTROCARDIOGRAM TRACING: CPT

## 2023-11-10 PROCEDURE — 93010 EKG 12-LEAD: ICD-10-PCS | Mod: ,,, | Performed by: INTERNAL MEDICINE

## 2023-11-10 PROCEDURE — 81000 URINALYSIS NONAUTO W/SCOPE: CPT | Performed by: PHYSICIAN ASSISTANT

## 2023-11-10 PROCEDURE — 83735 ASSAY OF MAGNESIUM: CPT | Performed by: PHYSICIAN ASSISTANT

## 2023-11-10 PROCEDURE — 99285 EMERGENCY DEPT VISIT HI MDM: CPT | Mod: 25

## 2023-11-10 PROCEDURE — 63600175 PHARM REV CODE 636 W HCPCS: Performed by: PHYSICIAN ASSISTANT

## 2023-11-10 PROCEDURE — 82962 GLUCOSE BLOOD TEST: CPT

## 2023-11-10 PROCEDURE — 85025 COMPLETE CBC W/AUTO DIFF WBC: CPT | Performed by: PHYSICIAN ASSISTANT

## 2023-11-10 PROCEDURE — 25000003 PHARM REV CODE 250: Performed by: PHYSICIAN ASSISTANT

## 2023-11-10 RX ORDER — MECLIZINE HYDROCHLORIDE 25 MG/1
25 TABLET ORAL
Status: COMPLETED | OUTPATIENT
Start: 2023-11-10 | End: 2023-11-10

## 2023-11-10 RX ADMIN — SODIUM CHLORIDE, POTASSIUM CHLORIDE, SODIUM LACTATE AND CALCIUM CHLORIDE 1000 ML: 600; 310; 30; 20 INJECTION, SOLUTION INTRAVENOUS at 09:11

## 2023-11-10 RX ADMIN — SODIUM CHLORIDE, POTASSIUM CHLORIDE, SODIUM LACTATE AND CALCIUM CHLORIDE 1000 ML: 600; 310; 30; 20 INJECTION, SOLUTION INTRAVENOUS at 10:11

## 2023-11-10 RX ADMIN — MECLIZINE HYDROCHLORIDE 25 MG: 25 TABLET ORAL at 10:11

## 2023-11-11 VITALS
HEIGHT: 64 IN | BODY MASS INDEX: 35 KG/M2 | TEMPERATURE: 98 F | HEART RATE: 93 BPM | WEIGHT: 205 LBS | RESPIRATION RATE: 20 BRPM | OXYGEN SATURATION: 99 % | SYSTOLIC BLOOD PRESSURE: 138 MMHG | DIASTOLIC BLOOD PRESSURE: 79 MMHG

## 2023-11-11 RX ORDER — ONDANSETRON 4 MG/1
4 TABLET, ORALLY DISINTEGRATING ORAL EVERY 6 HOURS PRN
Qty: 12 TABLET | Refills: 0 | Status: SHIPPED | OUTPATIENT
Start: 2023-11-11

## 2023-11-11 RX ORDER — MECLIZINE HYDROCHLORIDE 25 MG/1
25 TABLET ORAL 3 TIMES DAILY PRN
Qty: 20 TABLET | Refills: 0 | Status: SHIPPED | OUTPATIENT
Start: 2023-11-11

## 2023-11-11 NOTE — ED TRIAGE NOTES
Pt presents to ER with c/o dizziness since 0700. Pt states when she is still she's fine, but when she get's up and start moving around she is dizzy and light headed. Pt also reports that she has had diarrhea since this am. Pt denies any other issues at this time. Pt AAOx4.

## 2023-11-11 NOTE — DISCHARGE INSTRUCTIONS
Make sure you are drinking plenty of fluids if you are not eating as much.  Zofran as needed for nausea.  Meclizine as needed for persistent dizziness. Be aware, this medication is sedating.  Do not mix with alcohol or any other sedating medications.  Do not drive or operate machinery when taking this medication.     Contact your primary care provider for follow-up and re-evaluation.  Follow-up and establish care with a local ENT using information provided.    Return to this ED if your dizziness worsens despite treatment, if you develop severe headache, if you begin with chest pain or shortness of breath, if you develop any arm or leg weakness, difficulty speaking, unsteadiness or difficulty with walking, if any other problems occur.

## 2023-11-11 NOTE — ED PROVIDER NOTES
"Encounter Date: 11/10/2023       History     Chief Complaint   Patient presents with    Dizziness     Pt to ER with reports of waking up at 7 AM dizzy. Pt reports " when il sitting still I'm fine but as soon as I move my head I'm dizzy". Pt denies hx of vertigo. No sick contact      38-year-old female presents to ED with chief complaint dizziness.    Patient woke this morning with dizziness.  She describes dizziness as feeling off balance when walking, feels like she is spinning when she turns her head.  Dizziness resolves when she keeps her head straight, sits still.  She denies visual disturbance.  She denies feeling lightheaded or near syncopal.  She denies chest pain or shortness of breath.  No reported palpitations.  Denies associated nausea vomiting.  No history of ACS.  Denies hypertension, hyperlipidemia.  Nonsmoker.  Denies known family history of premature cardiac disease.  No neck pain or stiffness.  No recent illness.  No fever chills myalgias.  No sinus issues. Denies history of vertigo.  No otalgia.  No tinnitus.  No change in hearing. No history of CVA.  She denies headache.  No arm or leg weakness, slurred speech, facial droop, aphasia.  She does feel that she is leaning a bit to the left when she ambulates.  No head trauma.  No recent illness.  No sinus issues.      Recently increased the dose of her Ozempic on Monday of this week, admits to poor appetite and intake this week, early satiety, nausea without emesis.  She states this happened when she 1st started taking the Ozempic.      PMH:  Obesity  Uterine fibroids  Ovarian cysts  GERD  NIDDM    Rx: Ozempic qweekly. Currently on Bactrim for R hand paronychia.       Review of patient's allergies indicates:  No Known Allergies  Past Medical History:   Diagnosis Date    Diabetes mellitus     Fibroid, uterine     GERD (gastroesophageal reflux disease)     Ovarian cyst      Past Surgical History:   Procedure Laterality Date     SECTION, CLASSIC  "     x 2    CYST REMOVAL      from both breast x 2     Family History   Problem Relation Age of Onset    Hypertension Mother     Diabetes Father     Sickle cell trait Brother     Diabetes Maternal Grandmother      Social History     Tobacco Use    Smoking status: Never    Smokeless tobacco: Never   Substance Use Topics    Alcohol use: Yes     Comment: occasionally    Drug use: Yes     Types: Marijuana     Comment: occassionally     Review of Systems   Constitutional:  Positive for appetite change and fatigue. Negative for chills and fever.   HENT:  Negative for congestion, ear discharge, ear pain, hearing loss, rhinorrhea, sinus pressure, sinus pain, sore throat and trouble swallowing.    Eyes:  Negative for visual disturbance.   Respiratory:  Negative for cough and shortness of breath.    Cardiovascular:  Negative for chest pain.   Gastrointestinal:  Positive for diarrhea and nausea. Negative for abdominal pain, blood in stool and vomiting.   Genitourinary:  Negative for dysuria.   Musculoskeletal:  Negative for back pain, myalgias, neck pain and neck stiffness.   Skin:  Negative for rash.   Neurological:  Positive for dizziness. Negative for seizures, syncope, facial asymmetry, speech difficulty, weakness, light-headedness, numbness and headaches.       Physical Exam     Initial Vitals [11/10/23 1856]   BP Pulse Resp Temp SpO2   121/76 102 18 98.1 °F (36.7 °C) 99 %      MAP       --         Physical Exam    Nursing note and vitals reviewed.  Constitutional: She appears well-developed and well-nourished. She is not diaphoretic. No distress.   Well-appearing nontoxic.  Sitting upright on exam table.   HENT:   Head: Normocephalic and atraumatic.   No catch up saccade. Faint skew deviation with cover/uncover.  No nystagmus with [place]-Hallpike.    Bilateral TMs and ear canals wnl.     Face symmetric.   Eyes: EOM are normal. Pupils are equal, round, and reactive to light.   Fatigable left beating horizontal nystagmus.    Neck: Neck supple.   No carotid bruit   Normal range of motion.  Cardiovascular:  Normal rate, regular rhythm and intact distal pulses.           2/6 holosystolic murmur. Sinus tachycardia.    Pulmonary/Chest: Breath sounds normal. No respiratory distress.   Abdominal: Abdomen is soft.   Musculoskeletal:         General: No tenderness. Normal range of motion.      Cervical back: Normal range of motion and neck supple.     Neurological: She is alert and oriented to person, place, and time. She has normal strength. GCS score is 15. GCS eye subscore is 4. GCS verbal subscore is 5. GCS motor subscore is 6.   Grossly symmetric upper and lower extremity strength.  Negative Romberg.  No pronator drift.  Able to toe-walk and heel-walk without any issues.  She does deviated bit to the left with ambulation, however there is no ataxic gait.   Skin: Skin is warm. Capillary refill takes less than 2 seconds.   Psychiatric: She has a normal mood and affect. Thought content normal.         ED Course   Procedures  Labs Reviewed   COMPREHENSIVE METABOLIC PANEL - Abnormal; Notable for the following components:       Result Value    Glucose 153 (*)     AST 8 (*)     ALT 9 (*)     All other components within normal limits   URINALYSIS, REFLEX TO URINE CULTURE - Abnormal; Notable for the following components:    Appearance, UA Hazy (*)     Protein, UA Trace (*)     Leukocytes, UA Trace (*)     All other components within normal limits    Narrative:     Specimen Source->Urine   POCT GLUCOSE - Abnormal; Notable for the following components:    POCT Glucose 128 (*)     All other components within normal limits   CBC W/ AUTO DIFFERENTIAL   MAGNESIUM   TROPONIN I   URINALYSIS MICROSCOPIC    Narrative:     Specimen Source->Urine     EKG Readings: (Independently Interpreted)   Normal sinus rhythm, ventricular rate 86 beats per minute.  Normal AZ, normal QT, normal QRS duration.  No right axis deviation.  No ST elevation.  Mild artifact.   Questionable biphasic versus inverted T-wave lead 3, inverted T-wave V2.  EKG appears grossly similar previous dated 03/28/2018.       Imaging Results              CT Head Without Contrast (Final result)  Result time 11/10/23 23:43:33      Final result by Zoë Garcia MD (11/10/23 23:43:33)                   Impression:      No acute intracranial abnormality detected.      Electronically signed by: Zoë Garcia  Date:    11/10/2023  Time:    23:43               Narrative:    EXAMINATION:  CT OF THE HEAD WITHOUT    CLINICAL HISTORY:  Dizziness, persistent/recurrent, cardiac or vascular cause suspected;    TECHNIQUE:  5 mm unenhanced axial images were obtained from the skull base to the vertex.    COMPARISON:  None.    FINDINGS:  The ventricles, basal cisterns, and cortical sulci are within normal limits for patient's stated age. There is no acute intracranial hemorrhage, territorial infarct or mass effect, or midline shift. The visualized paranasal sinuses and mastoid air cells are clear.                                       X-Ray Chest 1 View (Final result)  Result time 11/10/23 21:25:05      Final result by Zoë Garcia MD (11/10/23 21:25:05)                   Impression:      Unremarkable one view of the chest.      Electronically signed by: Zoë Garcia  Date:    11/10/2023  Time:    21:25               Narrative:    EXAMINATION:  CHEST ONE VIEW    CLINICAL HISTORY:  Dizziness and giddiness    TECHNIQUE:  One view of the chest.    COMPARISON:  11/14/17    FINDINGS:  The cardiac silhouette is within normal limits.   There is no focal consolidation, pneumothorax, or pleural effusion.                                    X-Rays:   Independently Interpreted Readings:   Chest X-Ray: Personal interpretation:  No significant cardiomegaly, no dense consolidation, no convincing effusion, no obvious pneumothorax.     Medications   lactated ringers bolus 1,000 mL (0 mLs Intravenous Stopped 11/10/23  3837)   meclizine tablet 25 mg (25 mg Oral Given 11/10/23 2249)   lactated ringers bolus 1,000 mL (0 mLs Intravenous Stopped 11/11/23 0033)     Medical Decision Making  D/D: ACS, CVA, vertigo, dehydration, vertebral artery dissection    Amount and/or Complexity of Data Reviewed  External Data Reviewed: notes.  Labs: ordered. Decision-making details documented in ED Course.  Radiology: ordered and independent interpretation performed. Decision-making details documented in ED Course.  ECG/medicine tests: ordered and independent interpretation performed. Decision-making details documented in ED Course.     Details: Reassuring EKG.  No personal history of ACS.  No chest pain or shortness of breath.  Negative troponin.  Reassuring heart score.  Discussion of management or test interpretation with external provider(s): Confounding factors:  Currently on Bactrim for paronychia.  Increased dose of Ozempic on Monday--associated poor appetite and intake, with satiety since change in the dosage this week, also 4 episodes of watery loose stools this morning.    Fatigable left beating horizontal nystagmus with EOM testing.  No nystagmus with [place] Hallpike.   HINTS: no catch up saccade, no nystagmus, there is skew deviation with cover/uncover.     Given that the dizziness is reproducible, intermittent, think more related to peripheral cause.  Think unlikely central vertigo, however there are equivocal findings on my HINTS exam and ENT testing.  There is no ataxic gait although she does deviated bit to left with ambulation.  There is no history of CVA.  She denies headache.  There is no carotid bruit.  Reassuring vitals.  Otherwise healthy.  Given poor p.o. intake, will give a L of fluids and re-evaluate neurologically to see if any improvement of her dizziness.    Risk  Prescription drug management.      Additional MDM:   Heart Score:    History:          Slightly suspicious.  ECG:             Nonspecific repolarisation  disturbance  Age:               Less than 45 years  Risk factors: 1-2 risk factors  Troponin:       Less than or equal to normal limit  Heart Score = 2                ED Course as of 11/11/23 0422   Fri Nov 10, 2023   2219 No orthostasis.  Questionable UTI, however contaminated specimen.  No urinary complaints. [SM]   2245 No improvement in dizziness following fluids, will give another L of fluids, meclizine, order CT head, re-evaluate to see if any improvement. [SM]   Sat Nov 11, 2023   0005 Pt feels much better following 2nd litre of fluids, Meclizine.  At this time, I think she can be safely discharged as an outpatient.  Referral to ENT placed.  Advised lots of fluids if she is not eating as much, Zofran p.r.n. nausea.  No convincing evidence of LVO.  Think unlikely posterior circulation CVA, however have discussed red flags reasons for return with patient.  She does feel comfortable with discharge and outpatient follow-up. [SM]      ED Course User Index  [SM] William Sotelo PA-C                      Clinical Impression:   Final diagnoses:  [R42] Dizziness (Primary)        ED Disposition Condition    Discharge Stable          ED Prescriptions       Medication Sig Dispense Start Date End Date Auth. Provider    meclizine (ANTIVERT) 25 mg tablet Take 1 tablet (25 mg total) by mouth 3 (three) times daily as needed for Dizziness. 20 tablet 11/11/2023 -- William Sotelo PA-C    ondansetron (ZOFRAN-ODT) 4 MG TbDL Take 1 tablet (4 mg total) by mouth every 6 (six) hours as needed (Nausea). 12 tablet 11/11/2023 -- William Sotelo PA-C          Follow-up Information       Follow up With Specialties Details Why Contact [facility] - Ent Otolaryngology Schedule an appointment as soon as possible for a visit  For reevaluation [address] [facility] [address]  [phone]      Osbaldo Burgess, NP Internal Medicine Schedule an appointment as soon as possible for a visit  For  reevaluation [facility]  [place] [address]  [phone]                      William Sotelo, PA-C  11/11/23 0424

## 2023-11-16 ENCOUNTER — PATIENT OUTREACH (OUTPATIENT)
Dept: EMERGENCY MEDICINE | Facility: HOSPITAL | Age: 38
End: 2023-11-16
Payer: MEDICAID

## 2023-11-16 NOTE — PROGRESS NOTES
Chhaya Cifuentes  ED Navigator  Emergency Department    Project: [facility] ED Navigator  Role: Community Health Worker    Date: 11/16/2023  Patient Name: Breanna Fitch  MRN: [id number]  PCP: Osbaldo Burgess NP    Assessment:     Breanna Fitch is a 38 y.o. female who has presented to ED for dizziness. Patient has visited the ED 2 times in the past 3 months. Patient did not contact PCP.     ED Navigator Initial Assessment    ED Navigator Enrollment Documentation  Consent to Services  Does patient consent to completing the assessment?: Yes  Contact  Method of Initial Contact: Phone  Transportation  Does the patient have issues with Transportation?: No  Does the patient have transportation to and from healthcare appointments?: Yes  Insurance Coverage  Do you have coverage/adequate coverage?: Yes  Type/kind of coverage: Primary Cvg:  Medicaid/[place] [employer] [facility]  Is patient able to afford co-pays/deductibles?: Yes  Is patient able to afford HME or supplies?: Yes  Does patient have an established [facility] PCP?: Yes  Able to access?: Yes  Does the patient have a lack of adequate coverage?: No  Specialist Appointment  Did the patient come to the ED to see a specialist?: No  Does the patient have a pending specialist referral?: Yes  Does the patient have a specialist appointment made?: No  PCP Follow Up Appointment  Has the patient had an appointment with a primary care provider in the past year?: Yes  Approximate date: 5/16/23  Provider: Osbaldo Burgess NP  Does the patient have a follow up appontment with a PCP?: No  When was the last time you saw your PCP?: 5/16/23  Why does the patient not have a follow up scheduled?: Inconvenient appointment times  Medications  Is patient able to afford medication?: Yes  Is patient unable to get medication due to lack of transportation?: No  Psychological  Does the patient have psycho-social concerns?: No  Food  Does the patient have concerns about food?:  No  Communication/Education  Does the patient have limited English proficiency/English not primary language?: No  Does patient have low literacy and/or low health literacy?: Yes  Does patient have concerns with care?: No  Other Financial Concerns  Does the patient have immediate financial distress?: No  Does the patient have general financial concerns?: No  Other Social Barriers/Concerns  Does the patient have any additional barriers or concerns?: Work  Primary Barrier  Barriers identified: Cognitive barrier (health literacy, language and communication, etc.)  Root Cause of ED Utilization: Patient Knowledge/Low Health Literacy  Plan to address Patient Knowledge/Low Health Literacy: Provided information for [facility] 24/7 Nurse triage line [phone] or 1-866-[facility] ([phone])  Next steps: Provided Education  Was education/educational materials provided surrounding PCP services/creating a medical home?: Yes Was education verbal or written?: Verbal     Was education/educational materials provided surrounding low cost, healthy foods?: Yes Was education verbal or written?: Written     Was education/educational materials provided surrounding other items? If so, use comment to explain.: Yes Was education verbal or written?: Written   Plan: Provided information for [facility] 24/7 Nurse triage line, [phone] or [facility]-866[facility] ([phone])  Expected Date of Follow Up 1: 11/14/24  Additional Documentation: ED Navigator phoned patient regarding her recent ED visit. Patient doing fine. Assessment completed. Patient agreed to assistance finding a ENT. ED Navigator faxed patients ED provided referral to Dr Geovani Bellamy fax #[phone]. ED Navigator e-mailed patient Providers information. In addition to the provided information, [facility] form, [place] Virtual Visit Flyer, [facility] PCP scheduling assistance, [facility] on call RN#, and Heart Healthy Diet education sent to verified e-mail.  Chhaya  Esau          Social History     Socioeconomic History    Marital status: Single   Tobacco Use    Smoking status: Never    Smokeless tobacco: Never   Substance and Sexual Activity    Alcohol use: Yes     Comment: occasionally    Drug use: Yes     Types: Marijuana     Comment: occassionally    Sexual activity: Yes     Partners: Male     Birth control/protection: None     Social Determinants of Health     Financial Resource Strain: Low Risk  (11/16/2023)    Overall Financial Resource Strain (CARDIA)     Difficulty of Paying Living Expenses: Not hard at all   Food Insecurity: No Food Insecurity (11/16/2023)    Hunger Vital Sign     Worried About Running Out of Food in the Last Year: Never true     Ran Out of Food in the Last Year: Never true   Transportation Needs: No Transportation Needs (11/16/2023)    PRAPARE - Transportation     Lack of Transportation (Medical): No     Lack of Transportation (Non-Medical): No   Physical Activity: Inactive (11/16/2023)    Exercise Vital Sign     Days of Exercise per Week: 0 days     Minutes of Exercise per Session: 0 min   Stress: No Stress Concern Present (11/16/2023)    [employer] - Occupational Stress Questionnaire     Feeling of Stress : Not at all   Social Connections: Unknown (11/16/2023)    Social Connection and Isolation Panel [NHANES]     Frequency of Communication with Friends and Family: More than three times a week     Frequency of Social Gatherings with Friends and Family: More than three times a week     Attends Yarsanism Services: Patient refused     Active Member of Clubs or Organizations: Patient refused     Attends Club or Organization Meetings: Patient refused     Marital Status: Never    Housing Stability: Low Risk  (11/16/2023)    Housing Stability Vital Sign     Unable to Pay for Housing in the Last Year: No     Number of Places Lived in the Last Year: 1     Unstable Housing in the Last Year: No       Plan:     ED Navigator  phoned patient regarding her recent ED visit. Patient doing fine. Assessment completed. Patient agreed to assistance finding a ENT. ED Navigator faxed patients ED provided referral to Dr Geovani Bellamy fax #[phone]. ED Navigator e-mailed patient Providers information. In addition to the provided information, Right Care Right Place form, [place] Virtual Visit Flyer, [facility] PCP scheduling assistance, [facility] on call RN#, and Heart Healthy Diet education sent to verified e-mail.    Appointment made with: Osbaldo Burgess NP

## 2024-08-28 ENCOUNTER — LAB VISIT (OUTPATIENT)
Dept: LAB | Facility: HOSPITAL | Age: 39
End: 2024-08-28
Attending: STUDENT IN AN ORGANIZED HEALTH CARE EDUCATION/TRAINING PROGRAM
Payer: COMMERCIAL

## 2024-08-28 ENCOUNTER — OFFICE VISIT (OUTPATIENT)
Facility: CLINIC | Age: 39
End: 2024-08-28
Payer: COMMERCIAL

## 2024-08-28 ENCOUNTER — PATIENT OUTREACH (OUTPATIENT)
Dept: ADMINISTRATIVE | Facility: OTHER | Age: 39
End: 2024-08-28
Payer: COMMERCIAL

## 2024-08-28 VITALS
BODY MASS INDEX: 37.06 KG/M2 | RESPIRATION RATE: 18 BRPM | HEART RATE: 83 BPM | SYSTOLIC BLOOD PRESSURE: 122 MMHG | DIASTOLIC BLOOD PRESSURE: 88 MMHG | WEIGHT: 217.06 LBS | TEMPERATURE: 98 F | OXYGEN SATURATION: 98 % | HEIGHT: 64 IN

## 2024-08-28 DIAGNOSIS — E11.65 UNCONTROLLED TYPE 2 DIABETES MELLITUS WITH HYPERGLYCEMIA, WITHOUT LONG-TERM CURRENT USE OF INSULIN: Primary | ICD-10-CM

## 2024-08-28 DIAGNOSIS — F33.1 MODERATE EPISODE OF RECURRENT MAJOR DEPRESSIVE DISORDER: ICD-10-CM

## 2024-08-28 DIAGNOSIS — N92.6 MISSED PERIOD: ICD-10-CM

## 2024-08-28 DIAGNOSIS — E11.65 UNCONTROLLED TYPE 2 DIABETES MELLITUS WITH HYPERGLYCEMIA, WITHOUT LONG-TERM CURRENT USE OF INSULIN: ICD-10-CM

## 2024-08-28 DIAGNOSIS — Z11.3 ROUTINE SCREENING FOR STI (SEXUALLY TRANSMITTED INFECTION): ICD-10-CM

## 2024-08-28 DIAGNOSIS — N64.52 BREAST DISCHARGE: ICD-10-CM

## 2024-08-28 DIAGNOSIS — Z12.31 ENCOUNTER FOR SCREENING MAMMOGRAM FOR MALIGNANT NEOPLASM OF BREAST: ICD-10-CM

## 2024-08-28 DIAGNOSIS — E66.01 CLASS 2 SEVERE OBESITY DUE TO EXCESS CALORIES WITH SERIOUS COMORBIDITY AND BODY MASS INDEX (BMI) OF 37.0 TO 37.9 IN ADULT: ICD-10-CM

## 2024-08-28 LAB
ALBUMIN SERPL BCP-MCNC: 3.6 G/DL (ref 3.5–5.2)
ALP SERPL-CCNC: 115 U/L (ref 55–135)
ALT SERPL W/O P-5'-P-CCNC: 13 U/L (ref 10–44)
ANION GAP SERPL CALC-SCNC: 9 MMOL/L (ref 8–16)
AST SERPL-CCNC: 11 U/L (ref 10–40)
BASOPHILS # BLD AUTO: 0.02 K/UL (ref 0–0.2)
BASOPHILS NFR BLD: 0.3 % (ref 0–1.9)
BILIRUB SERPL-MCNC: 0.3 MG/DL (ref 0.1–1)
BUN SERPL-MCNC: 9 MG/DL (ref 6–20)
CALCIUM SERPL-MCNC: 9.6 MG/DL (ref 8.7–10.5)
CHLORIDE SERPL-SCNC: 103 MMOL/L (ref 95–110)
CHOLEST SERPL-MCNC: 209 MG/DL (ref 120–199)
CHOLEST/HDLC SERPL: 5.6 {RATIO} (ref 2–5)
CO2 SERPL-SCNC: 26 MMOL/L (ref 23–29)
CREAT SERPL-MCNC: 0.8 MG/DL (ref 0.5–1.4)
DIFFERENTIAL METHOD BLD: ABNORMAL
EOSINOPHIL # BLD AUTO: 0.1 K/UL (ref 0–0.5)
EOSINOPHIL NFR BLD: 1.4 % (ref 0–8)
ERYTHROCYTE [DISTWIDTH] IN BLOOD BY AUTOMATED COUNT: 11.7 % (ref 11.5–14.5)
EST. GFR  (NO RACE VARIABLE): >60 ML/MIN/1.73 M^2
ESTIMATED AVG GLUCOSE: 260 MG/DL (ref 68–131)
FSH SERPL-ACNC: 8.94 MIU/ML
GLUCOSE SERPL-MCNC: 205 MG/DL (ref 70–110)
HBA1C MFR BLD: 10.7 % (ref 4–5.6)
HBV SURFACE AG SERPL QL IA: NORMAL
HCG INTACT+B SERPL-ACNC: <1.2 MIU/ML
HCT VFR BLD AUTO: 43.4 % (ref 37–48.5)
HCV AB SERPL QL IA: NORMAL
HDLC SERPL-MCNC: 37 MG/DL (ref 40–75)
HDLC SERPL: 17.7 % (ref 20–50)
HGB BLD-MCNC: 13.7 G/DL (ref 12–16)
HIV 1+2 AB+HIV1 P24 AG SERPL QL IA: NORMAL
IMM GRANULOCYTES # BLD AUTO: 0.01 K/UL (ref 0–0.04)
IMM GRANULOCYTES NFR BLD AUTO: 0.1 % (ref 0–0.5)
LDLC SERPL CALC-MCNC: 141.8 MG/DL (ref 63–159)
LYMPHOCYTES # BLD AUTO: 3.5 K/UL (ref 1–4.8)
LYMPHOCYTES NFR BLD: 45.3 % (ref 18–48)
MCH RBC QN AUTO: 28.1 PG (ref 27–31)
MCHC RBC AUTO-ENTMCNC: 31.6 G/DL (ref 32–36)
MCV RBC AUTO: 89 FL (ref 82–98)
MONOCYTES # BLD AUTO: 0.3 K/UL (ref 0.3–1)
MONOCYTES NFR BLD: 4 % (ref 4–15)
NEUTROPHILS # BLD AUTO: 3.8 K/UL (ref 1.8–7.7)
NEUTROPHILS NFR BLD: 48.9 % (ref 38–73)
NONHDLC SERPL-MCNC: 172 MG/DL
NRBC BLD-RTO: 0 /100 WBC
PLATELET # BLD AUTO: 291 K/UL (ref 150–450)
PMV BLD AUTO: 10.5 FL (ref 9.2–12.9)
POTASSIUM SERPL-SCNC: 3.8 MMOL/L (ref 3.5–5.1)
PROLACTIN SERPL IA-MCNC: 5 NG/ML (ref 5.2–26.5)
PROT SERPL-MCNC: 7.9 G/DL (ref 6–8.4)
RBC # BLD AUTO: 4.87 M/UL (ref 4–5.4)
SODIUM SERPL-SCNC: 138 MMOL/L (ref 136–145)
TESTOST SERPL-MCNC: 21 NG/DL (ref 5–73)
TREPONEMA PALLIDUM IGG+IGM AB [PRESENCE] IN SERUM OR PLASMA BY IMMUNOASSAY: NONREACTIVE
TRIGL SERPL-MCNC: 151 MG/DL (ref 30–150)
TSH SERPL DL<=0.005 MIU/L-ACNC: 2.33 UIU/ML (ref 0.4–4)
WBC # BLD AUTO: 7.8 K/UL (ref 3.9–12.7)

## 2024-08-28 PROCEDURE — 80053 COMPREHEN METABOLIC PANEL: CPT | Performed by: STUDENT IN AN ORGANIZED HEALTH CARE EDUCATION/TRAINING PROGRAM

## 2024-08-28 PROCEDURE — 87389 HIV-1 AG W/HIV-1&-2 AB AG IA: CPT | Performed by: STUDENT IN AN ORGANIZED HEALTH CARE EDUCATION/TRAINING PROGRAM

## 2024-08-28 PROCEDURE — 99204 OFFICE O/P NEW MOD 45 MIN: CPT | Mod: S$GLB,,, | Performed by: STUDENT IN AN ORGANIZED HEALTH CARE EDUCATION/TRAINING PROGRAM

## 2024-08-28 PROCEDURE — 84702 CHORIONIC GONADOTROPIN TEST: CPT | Performed by: STUDENT IN AN ORGANIZED HEALTH CARE EDUCATION/TRAINING PROGRAM

## 2024-08-28 PROCEDURE — 84146 ASSAY OF PROLACTIN: CPT | Performed by: STUDENT IN AN ORGANIZED HEALTH CARE EDUCATION/TRAINING PROGRAM

## 2024-08-28 PROCEDURE — 83001 ASSAY OF GONADOTROPIN (FSH): CPT | Performed by: STUDENT IN AN ORGANIZED HEALTH CARE EDUCATION/TRAINING PROGRAM

## 2024-08-28 PROCEDURE — 3008F BODY MASS INDEX DOCD: CPT | Mod: CPTII,S$GLB,, | Performed by: STUDENT IN AN ORGANIZED HEALTH CARE EDUCATION/TRAINING PROGRAM

## 2024-08-28 PROCEDURE — 80061 LIPID PANEL: CPT | Performed by: STUDENT IN AN ORGANIZED HEALTH CARE EDUCATION/TRAINING PROGRAM

## 2024-08-28 PROCEDURE — 84443 ASSAY THYROID STIM HORMONE: CPT | Performed by: STUDENT IN AN ORGANIZED HEALTH CARE EDUCATION/TRAINING PROGRAM

## 2024-08-28 PROCEDURE — 84403 ASSAY OF TOTAL TESTOSTERONE: CPT | Performed by: STUDENT IN AN ORGANIZED HEALTH CARE EDUCATION/TRAINING PROGRAM

## 2024-08-28 PROCEDURE — 1159F MED LIST DOCD IN RCRD: CPT | Mod: CPTII,S$GLB,, | Performed by: STUDENT IN AN ORGANIZED HEALTH CARE EDUCATION/TRAINING PROGRAM

## 2024-08-28 PROCEDURE — 3074F SYST BP LT 130 MM HG: CPT | Mod: CPTII,S$GLB,, | Performed by: STUDENT IN AN ORGANIZED HEALTH CARE EDUCATION/TRAINING PROGRAM

## 2024-08-28 PROCEDURE — 86593 SYPHILIS TEST NON-TREP QUANT: CPT | Performed by: STUDENT IN AN ORGANIZED HEALTH CARE EDUCATION/TRAINING PROGRAM

## 2024-08-28 PROCEDURE — 3079F DIAST BP 80-89 MM HG: CPT | Mod: CPTII,S$GLB,, | Performed by: STUDENT IN AN ORGANIZED HEALTH CARE EDUCATION/TRAINING PROGRAM

## 2024-08-28 PROCEDURE — 36415 COLL VENOUS BLD VENIPUNCTURE: CPT | Performed by: STUDENT IN AN ORGANIZED HEALTH CARE EDUCATION/TRAINING PROGRAM

## 2024-08-28 PROCEDURE — 85025 COMPLETE CBC W/AUTO DIFF WBC: CPT | Performed by: STUDENT IN AN ORGANIZED HEALTH CARE EDUCATION/TRAINING PROGRAM

## 2024-08-28 PROCEDURE — 87340 HEPATITIS B SURFACE AG IA: CPT | Performed by: STUDENT IN AN ORGANIZED HEALTH CARE EDUCATION/TRAINING PROGRAM

## 2024-08-28 PROCEDURE — 99999 PR PBB SHADOW E&M-EST. PATIENT-LVL IV: CPT | Mod: PBBFAC,,, | Performed by: STUDENT IN AN ORGANIZED HEALTH CARE EDUCATION/TRAINING PROGRAM

## 2024-08-28 PROCEDURE — 86803 HEPATITIS C AB TEST: CPT | Performed by: STUDENT IN AN ORGANIZED HEALTH CARE EDUCATION/TRAINING PROGRAM

## 2024-08-28 PROCEDURE — 83036 HEMOGLOBIN GLYCOSYLATED A1C: CPT | Performed by: STUDENT IN AN ORGANIZED HEALTH CARE EDUCATION/TRAINING PROGRAM

## 2024-08-28 RX ORDER — SEMAGLUTIDE 1.34 MG/ML
1 INJECTION, SOLUTION SUBCUTANEOUS
Qty: 3 ML | Refills: 11 | Status: SHIPPED | OUTPATIENT
Start: 2024-08-28 | End: 2024-08-28

## 2024-08-28 RX ORDER — SERTRALINE HYDROCHLORIDE 50 MG/1
TABLET, FILM COATED ORAL
Qty: 57 TABLET | Refills: 0 | Status: SHIPPED | OUTPATIENT
Start: 2024-08-28 | End: 2024-10-27

## 2024-08-28 RX ORDER — INSULIN PUMP SYRINGE, 3 ML
EACH MISCELLANEOUS
Qty: 1 EACH | Refills: 0 | Status: SHIPPED | OUTPATIENT
Start: 2024-08-28 | End: 2024-08-28

## 2024-08-28 RX ORDER — SEMAGLUTIDE 1.34 MG/ML
1 INJECTION, SOLUTION SUBCUTANEOUS
Qty: 3 ML | Refills: 11 | Status: SHIPPED | OUTPATIENT
Start: 2024-08-28 | End: 2025-08-28

## 2024-08-28 RX ORDER — INSULIN PUMP SYRINGE, 3 ML
EACH MISCELLANEOUS
Qty: 1 EACH | Refills: 0 | Status: SHIPPED | OUTPATIENT
Start: 2024-08-28 | End: 2025-08-28

## 2024-08-28 RX ORDER — LANCETS
EACH MISCELLANEOUS
Qty: 100 EACH | Refills: 5 | Status: SHIPPED | OUTPATIENT
Start: 2024-08-28 | End: 2024-08-28

## 2024-08-28 RX ORDER — LANCETS
EACH MISCELLANEOUS
Qty: 100 EACH | Refills: 5 | Status: SHIPPED | OUTPATIENT
Start: 2024-08-28

## 2024-08-28 RX ORDER — SERTRALINE HYDROCHLORIDE 50 MG/1
TABLET, FILM COATED ORAL
Qty: 57 TABLET | Refills: 0 | Status: SHIPPED | OUTPATIENT
Start: 2024-08-28 | End: 2024-08-28

## 2024-08-28 NOTE — PROGRESS NOTES
CHW - Initial Contact    This Community Health Worker completed the Social Determinant of Health questionnaire with  MRN [id number] in clinic today.    Pt identified barriers of most importance are: NONE   Referrals to community agencies completed with patient/caregiver consent outside of [facility] include: NONE  Referrals were put through [facility] - : NO  Support and Services: No support & services have been documented.  Other information discussed the patient needs / wants help with: NONE   Follow up required: NO  No future outreach task assigned

## 2024-08-28 NOTE — PROGRESS NOTES
SUBJECTIVE     Chief Complaint   Patient presents with    Establish Care    Diabetes       HPI  Breanna Fitch is a 38 y.o. female with multiple medical diagnoses as listed in the medical history and problem list that presents for annual exam and establishment of care.    Pt reports that she has a good appetite and eats a varied diet including fruit and vegetables. She does not engage in cardiovascular exercise (walks a lot during work). She is not sleeping well at night. Wakes up at 3am, difficulty falling asleep. Endorses tiredness during the day. Pt does take any OTC supplements (multivitamin). She is under stress recently and mood has been down lately. She is sexually active and does not request sexually transmitted disease testing. Pt is UTD on age appropriate CA screening.     Pt lost Medicaid at the beginning of the year and just got onto her employer's health plan.     DM: Pt has been sharing Ozempic with her mom. She has been taking 2mg every 2 weeks for the past couple months. She was previously taking 1mg once a week before she lost her insurance. She has been on ozempic for about 2 years now.     Pt reports history of depression. Mood has been down lately. Having difficulty falling and staying asleep. Was previously on medication that helped for this. Cannot recall which medication it was.     Pt reports R nipple discharge since getting her nipples pierced last October. Reports milky white/yellow discharge mainly from the right but occasionally from the left. Endorses soreness/tenderness to the area. Denies warmth, redness, swollen. Denies fevers, chills.     Pt had an impacted tooth extracted and is currently on PCN.     Works as an  at several schools on the [employer]. Drinks alcohol 1-2x/month, never smoker, denies any other drug use.       PAST MEDICAL HISTORY:  Past Medical History:   Diagnosis Date    Diabetes mellitus     Fibroid, uterine     GERD (gastroesophageal reflux  disease)     Ovarian cyst        PAST SURGICAL HISTORY:  Past Surgical History:   Procedure Laterality Date     SECTION, CLASSIC      x 2    CYST REMOVAL      from both breast x 2       SOCIAL HISTORY:  Social History     Socioeconomic History    Marital status: Single   Tobacco Use    Smoking status: Never    Smokeless tobacco: Never   Substance and Sexual Activity    Alcohol use: Yes     Comment: occasionally    Drug use: Not Currently     Types: Marijuana     Comment: occassionally    Sexual activity: Yes     Partners: Male     Birth control/protection: None     Social Determinants of Health     Financial Resource Strain: Medium Risk (2024)    Overall Financial Resource Strain (CARDIA)     Difficulty of Paying Living Expenses: Somewhat hard   Food Insecurity: Food Insecurity Present (2024)    Hunger Vital Sign     Worried About Running Out of Food in the Last Year: Sometimes true     Ran Out of Food in the Last Year: Sometimes true   Transportation Needs: No Transportation Needs (2024)    PRAPARE - Transportation     Lack of Transportation (Medical): No     Lack of Transportation (Non-Medical): No   Physical Activity: Inactive (2024)    Exercise Vital Sign     Days of Exercise per Week: 0 days     Minutes of Exercise per Session: 0 min   Stress: No Stress Concern Present (2024)    [employer] [place] [employer] - Occupational Stress Questionnaire     Feeling of Stress : Only a little   Recent Concern: Stress - Stress Concern Present (2024)    [employer] [place] [employer] - Occupational Stress Questionnaire     Feeling of Stress : Very much   Housing Stability: Low Risk  (2024)    Housing Stability Vital Sign     Unable to Pay for Housing in the Last Year: No     Homeless in the Last Year: No       FAMILY HISTORY:  Family History   Problem Relation Name Age of Onset    Hypertension Mother      Diabetes Father      Sickle cell trait Brother       "Diabetes Maternal Grandmother         ALLERGIES AND MEDICATIONS: updated and reviewed.  Review of patient's allergies indicates:  No Known Allergies  Current Outpatient Medications   Medication Sig Dispense Refill    blood sugar diagnostic Strp To check BG 3 times daily, to use with insurance preferred meter 1 each 0    blood-glucose meter kit To check BG 3 times daily, to use with insurance preferred meter 1 each 0    lancets Misc To check BG 3 times daily, to use with insurance preferred meter 100 each 5    semaglutide (OZEMPIC) 1 mg/dose (4 mg/3 mL) Inject 1 mg into the skin every 7 days. 3 mL 11    sertraline (ZOLOFT) 50 MG tablet Take 0.5 tablets (25 mg total) by mouth once daily for 7 days, THEN 1 tablet (50 mg total) once daily. 57 tablet 0     No current facility-administered medications for this visit.       ROS  Review of Systems   Constitutional:  Negative for chills, fatigue and fever.   HENT:  Negative for rhinorrhea and sore throat.    Respiratory:  Negative for cough and shortness of breath.    Cardiovascular:  Negative for chest pain and palpitations.   Gastrointestinal:  Negative for constipation, diarrhea, nausea and vomiting.   Genitourinary:  Negative for dysuria.   Musculoskeletal:  Negative for joint swelling.   Skin:  Negative for rash and wound.   Neurological:  Negative for light-headedness and headaches.   Psychiatric/Behavioral:  Negative for dysphoric mood and sleep disturbance. The patient is not nervous/anxious.          OBJECTIVE     Physical Exam  Vitals:    08/28/24 0955   BP: 122/88   Pulse: 83   Resp: 18   Temp: 97.8 °F (36.6 °C)    Body mass index is 37.26 kg/m².  Weight: 98.5 kg (217 lb 0.7 oz)   Height: 5' 4" (162.6 cm)     Physical Exam  Vitals reviewed.   Constitutional:       General: She is not in acute distress.  HENT:      Right Ear: External ear normal.      Left Ear: External ear normal.      Nose: Nose normal.      Mouth/Throat:      Mouth: Mucous membranes are moist. "   Eyes:      Extraocular Movements: Extraocular movements intact.      Conjunctiva/sclera: Conjunctivae normal.      Pupils: Pupils are equal, round, and reactive to light.   Pulmonary:      Effort: Pulmonary effort is normal.   Abdominal:      General: There is no distension.      Palpations: Abdomen is soft.   Musculoskeletal:         General: No swelling. Normal range of motion.      Cervical back: Normal range of motion.   Skin:     General: Skin is warm and dry.      Findings: No rash.   Neurological:      General: No focal deficit present.      Mental Status: She is alert and oriented to person, place, and time.   Psychiatric:         Mood and Affect: Mood normal.         Behavior: Behavior normal.           Health Maintenance         Date Due Completion Date    Lipid Panel Never done ---    Hemoglobin A1c Never done ---    Pneumococcal Vaccines (Age 0-64) (1 of 2 - PCV) Never done ---    TETANUS VACCINE Never done ---    Cervical Cancer Screening 11/25/2020 11/25/2019    COVID-19 Vaccine (3 - 2023-24 season) 09/01/2023 8/2/2021    Influenza Vaccine (1) 09/01/2024 ---              ASSESSMENT     38 y.o. female with     1. Uncontrolled type 2 diabetes mellitus with hyperglycemia, without long-term current use of insulin    2. Class 2 severe obesity due to excess calories with serious comorbidity and body mass index (BMI) of 37.0 to 37.9 in adult    3. Moderate episode of recurrent major depressive disorder    4. Encounter for screening mammogram for malignant neoplasm of breast    5. Routine screening for STI (sexually transmitted infection)    6. Breast discharge    7. Missed period        PLAN:     1. Uncontrolled type 2 diabetes mellitus with hyperglycemia, without long-term current use of insulin  -Uncontrolled, restart meds. Patient is encouraged to follow a diet low in carbohydrates and simple sugars. Advised to focus on good food choices and increased physical activity and encouraged to adhere to  medication regimen and/or lifestyle adjustments, and to check glucose level as recommended.  Contact office if glucose levels are not improving over time.  Will monitor HbA1c appropriately.   - Hemoglobin A1C; Future  - Lipid Panel; Future  - TSH; Future  - Comprehensive Metabolic Panel; Future  - CBC Auto Differential; Future  - semaglutide (OZEMPIC) 1 mg/dose (4 mg/3 mL); Inject 1 mg into the skin every 7 days.  Dispense: 3 mL; Refill: 11  - lancets Misc; To check BG 3 times daily, to use with insurance preferred meter  Dispense: 100 each; Refill: 5  - blood-glucose meter kit; To check BG 3 times daily, to use with insurance preferred meter  Dispense: 1 each; Refill: 0  - blood sugar diagnostic Strp; To check BG 3 times daily, to use with insurance preferred meter  Dispense: 1 each; Refill: 0    2. Class 2 severe obesity due to excess calories with serious comorbidity and body mass index (BMI) of 37.0 to 37.9 in adult  - Noted. Pt counseled on diet and exercise for healthy lifestyle.  - Hemoglobin A1C; Future  - Lipid Panel; Future  - TSH; Future  - Comprehensive Metabolic Panel; Future  - CBC Auto Differential; Future  - semaglutide (OZEMPIC) 1 mg/dose (4 mg/3 mL); Inject 1 mg into the skin every 7 days.  Dispense: 3 mL; Refill: 11    3. Moderate episode of recurrent major depressive disorder  -Uncontrolled. Start zoloft. F/u 6 weeks.  - Hemoglobin A1C; Future  - Lipid Panel; Future  - TSH; Future  - Comprehensive Metabolic Panel; Future  - CBC Auto Differential; Future  - sertraline (ZOLOFT) 50 MG tablet; Take 0.5 tablets (25 mg total) by mouth once daily for 7 days, THEN 1 tablet (50 mg total) once daily.  Dispense: 57 tablet; Refill: 0    4. Encounter for screening mammogram for malignant neoplasm of breast  - Due, ordered.  - Mammo Digital Screening Bilat w/ Scotty; Future    5. Routine screening for STI (sexually transmitted infection)  - Due, ordered.  - Hepatitis B Surface Antigen; Future  - Hepatitis C  Antibody; Future  - HIV 1/2 Ag/Ab (4th Gen); Future  - Treponema Pallidium Antibodies IgG, IgM; Future    6. Breast discharge  - New. Check labs and mammo. F/u pending results.  - PROLACTIN; Future  - FOLLICLE STIMULATING HORMONE; Future    7. Missed period  - New. Check labs and mammo. F/u pending results.  - HCG, QUANTITATIVE, PREGNANCY; Future  - TESTOSTERONE; Future  - FOLLICLE STIMULATING HORMONE; Future        Dominique Alex MD  08/28/2024 10:09 AM        Follow up in about 6 weeks (around 10/9/2024).

## 2024-08-30 DIAGNOSIS — E11.65 UNCONTROLLED TYPE 2 DIABETES MELLITUS WITH HYPERGLYCEMIA, WITHOUT LONG-TERM CURRENT USE OF INSULIN: Primary | ICD-10-CM

## 2024-08-30 RX ORDER — GLIMEPIRIDE 2 MG/1
2 TABLET ORAL
Qty: 60 TABLET | Refills: 3 | Status: SHIPPED | OUTPATIENT
Start: 2024-08-30 | End: 2025-08-30

## 2024-10-09 ENCOUNTER — OFFICE VISIT (OUTPATIENT)
Facility: CLINIC | Age: 39
End: 2024-10-09
Payer: COMMERCIAL

## 2024-10-09 VITALS
RESPIRATION RATE: 18 BRPM | OXYGEN SATURATION: 98 % | HEIGHT: 64 IN | SYSTOLIC BLOOD PRESSURE: 104 MMHG | BODY MASS INDEX: 36.83 KG/M2 | TEMPERATURE: 98 F | WEIGHT: 215.75 LBS | DIASTOLIC BLOOD PRESSURE: 78 MMHG | HEART RATE: 88 BPM

## 2024-10-09 DIAGNOSIS — K30 INDIGESTION: ICD-10-CM

## 2024-10-09 DIAGNOSIS — L03.90 WOUND CELLULITIS: ICD-10-CM

## 2024-10-09 DIAGNOSIS — E66.812 CLASS 2 SEVERE OBESITY DUE TO EXCESS CALORIES WITH SERIOUS COMORBIDITY AND BODY MASS INDEX (BMI) OF 37.0 TO 37.9 IN ADULT: Primary | ICD-10-CM

## 2024-10-09 DIAGNOSIS — F33.1 MODERATE EPISODE OF RECURRENT MAJOR DEPRESSIVE DISORDER: ICD-10-CM

## 2024-10-09 DIAGNOSIS — E11.65 UNCONTROLLED TYPE 2 DIABETES MELLITUS WITH HYPERGLYCEMIA, WITHOUT LONG-TERM CURRENT USE OF INSULIN: ICD-10-CM

## 2024-10-09 DIAGNOSIS — E66.01 CLASS 2 SEVERE OBESITY DUE TO EXCESS CALORIES WITH SERIOUS COMORBIDITY AND BODY MASS INDEX (BMI) OF 37.0 TO 37.9 IN ADULT: Primary | ICD-10-CM

## 2024-10-09 DIAGNOSIS — N91.2 AMENORRHEA: ICD-10-CM

## 2024-10-09 PROCEDURE — 1160F RVW MEDS BY RX/DR IN RCRD: CPT | Mod: CPTII,S$GLB,, | Performed by: STUDENT IN AN ORGANIZED HEALTH CARE EDUCATION/TRAINING PROGRAM

## 2024-10-09 PROCEDURE — 99999 PR PBB SHADOW E&M-EST. PATIENT-LVL V: CPT | Mod: PBBFAC,,, | Performed by: STUDENT IN AN ORGANIZED HEALTH CARE EDUCATION/TRAINING PROGRAM

## 2024-10-09 PROCEDURE — 99214 OFFICE O/P EST MOD 30 MIN: CPT | Mod: S$GLB,,, | Performed by: STUDENT IN AN ORGANIZED HEALTH CARE EDUCATION/TRAINING PROGRAM

## 2024-10-09 PROCEDURE — 3008F BODY MASS INDEX DOCD: CPT | Mod: CPTII,S$GLB,, | Performed by: STUDENT IN AN ORGANIZED HEALTH CARE EDUCATION/TRAINING PROGRAM

## 2024-10-09 PROCEDURE — 3078F DIAST BP <80 MM HG: CPT | Mod: CPTII,S$GLB,, | Performed by: STUDENT IN AN ORGANIZED HEALTH CARE EDUCATION/TRAINING PROGRAM

## 2024-10-09 PROCEDURE — 3046F HEMOGLOBIN A1C LEVEL >9.0%: CPT | Mod: CPTII,S$GLB,, | Performed by: STUDENT IN AN ORGANIZED HEALTH CARE EDUCATION/TRAINING PROGRAM

## 2024-10-09 PROCEDURE — 3074F SYST BP LT 130 MM HG: CPT | Mod: CPTII,S$GLB,, | Performed by: STUDENT IN AN ORGANIZED HEALTH CARE EDUCATION/TRAINING PROGRAM

## 2024-10-09 PROCEDURE — 1159F MED LIST DOCD IN RCRD: CPT | Mod: CPTII,S$GLB,, | Performed by: STUDENT IN AN ORGANIZED HEALTH CARE EDUCATION/TRAINING PROGRAM

## 2024-10-09 RX ORDER — SERTRALINE HYDROCHLORIDE 50 MG/1
50 TABLET, FILM COATED ORAL DAILY
Qty: 90 EACH | Refills: 3 | Status: SHIPPED | OUTPATIENT
Start: 2024-10-09 | End: 2025-10-09

## 2024-10-09 RX ORDER — GLIMEPIRIDE 2 MG/1
2 TABLET ORAL
Qty: 60 TABLET | Refills: 3 | Status: SHIPPED | OUTPATIENT
Start: 2024-10-09 | End: 2025-10-09

## 2024-10-09 RX ORDER — FAMOTIDINE 40 MG/1
40 TABLET, FILM COATED ORAL DAILY
Qty: 90 TABLET | Refills: 3 | Status: SHIPPED | OUTPATIENT
Start: 2024-10-09 | End: 2025-10-09

## 2024-10-09 NOTE — PATIENT INSTRUCTIONS
Please call [phone] to schedule your mammogram.     Tracy Diallo MD- Ob/gyn [phone]

## 2024-10-09 NOTE — PROGRESS NOTES
SUBJECTIVE     Chief Complaint   Patient presents with    Diabetes     Follow up    Hand Pain     Pt stated she went fishing and a fish scale went through her finger on her right hand       History of Present Illness    CHIEF COMPLAINT:  Breanna presents today for follow up.    DIABETES MANAGEMENT:  She reports monitoring blood sugar. The highest reading recorded was 182, with most readings ranging from 120s to 150s. She is currently taking Glimepiride. She reports indigestion as a side effect of Ozempic.    MENTAL HEALTH:  She is taking Zoloft 50 mg and reports improved mood and feeling calmer since starting the medication.    WOUND CARE:  She reports slight pain associated with a finger wound, which is limiting her mobility. She denies any signs of infection or inflammation.    GYNECOLOGICAL HEALTH:  She reports a history of fibroids and experiences irregular periods. Her previous OB/GYN, Dr. Robles, had suggested a hysterectomy. She expresses interest in seeking a second opinion.     PREVENTIVE CARE:  She has not yet scheduled her mammogram    ROS:  General: -fever, -chills, -fatigue, -weight gain, -weight loss  Eyes: -vision changes, -redness, -discharge  ENT: -ear pain, -nasal congestion, -sore throat  Cardiovascular: -chest pain, -palpitations, -lower extremity edema  Respiratory: -cough, -shortness of breath  Gastrointestinal: -abdominal pain, -nausea, -vomiting, -diarrhea, -constipation, -blood in stool  Genitourinary: -dysuria, -hematuria, -frequency  Musculoskeletal: -joint pain, -muscle pain  Skin: -rash, -lesion  Neurological: -headache, -dizziness, -numbness, -tingling  Psychiatric: -anxiety, -depression, -sleep difficulty, -mood swings           PAST MEDICAL HISTORY:  Past Medical History:   Diagnosis Date    Diabetes mellitus     Fibroid, uterine     GERD (gastroesophageal reflux disease)     Ovarian cyst        ALLERGIES AND MEDICATIONS: updated and reviewed.  Review of patient's allergies  "indicates:  No Known Allergies  Current Outpatient Medications   Medication Sig Dispense Refill    blood sugar diagnostic Strp To check BG 3 times daily, to use with insurance preferred meter 1 each 0    blood-glucose meter kit To check BG 3 times daily, to use with insurance preferred meter 1 each 0    lancets Misc To check BG 3 times daily, to use with insurance preferred meter 100 each 5    semaglutide (OZEMPIC) 1 mg/dose (4 mg/3 mL) Inject 1 mg into the skin every 7 days. 3 mL 11    famotidine (PEPCID) 40 MG tablet Take 1 tablet (40 mg total) by mouth once daily. 90 tablet 3    glimepiride (AMARYL) 2 MG tablet Take 1 tablet (2 mg total) by mouth before breakfast. 60 tablet 3    sertraline (ZOLOFT) 50 MG tablet Take 1 tablet (50 mg total) by mouth once daily. 90 each 3     No current facility-administered medications for this visit.         OBJECTIVE     Physical Exam  Vitals:    10/09/24 0931   BP: 104/78   Pulse: 88   Resp: 18   Temp: 97.5 °F (36.4 °C)    Body mass index is 37.03 kg/m².  Weight: 97.8 kg (215 lb 11.5 oz)   Height: 5' 4" (162.6 cm)     Physical Exam  Vitals reviewed.   Constitutional:       General: She is not in acute distress.  HENT:      Right Ear: External ear normal.      Left Ear: External ear normal.      Nose: Nose normal.      Mouth/Throat:      Mouth: Mucous membranes are moist.   Eyes:      Extraocular Movements: Extraocular movements intact.      Conjunctiva/sclera: Conjunctivae normal.      Pupils: Pupils are equal, round, and reactive to light.   Pulmonary:      Effort: Pulmonary effort is normal.   Abdominal:      General: There is no distension.      Palpations: Abdomen is soft.   Musculoskeletal:         General: No swelling. Normal range of motion.      Cervical back: Normal range of motion.   Skin:     General: Skin is warm and dry.      Findings: No rash.   Neurological:      General: No focal deficit present.      Mental Status: She is alert and oriented to person, place, and " time.   Psychiatric:         Mood and Affect: Mood normal.         Behavior: Behavior normal.           Health Maintenance         Date Due Completion Date    Pneumococcal Vaccines (Age 0-64) (1 of 2 - PCV) Never done ---    TETANUS VACCINE Never done ---    Cervical Cancer Screening 11/25/2020 11/25/2019    Mammogram 01/09/2024 1/9/2023    Influenza Vaccine (1) Never done ---    COVID-19 Vaccine (3 - 2024-25 season) 09/01/2024 8/2/2021    Hemoglobin A1c 08/28/2025 8/28/2024    RSV Vaccine (Age 60+ and Pregnant patients) (1 - 1-dose 75+ series) 10/08/2059 ---              ASSESSMENT     39 y.o. female with     1. Class 2 severe obesity due to excess calories with serious comorbidity and body mass index (BMI) of 37.0 to 37.9 in adult    2. Uncontrolled type 2 diabetes mellitus with hyperglycemia, without long-term current use of insulin    3. Moderate episode of recurrent major depressive disorder    4. Amenorrhea    5. Wound cellulitis    6. Indigestion      Assessed patient's blood sugar control on Ozempic, noting improvement with levels ranging from 120s to 150s, with a highest reading of 182  Evaluated wound on patient's body, determining it is healing well without signs of infection  Reviewed patient's GYN history, including previous fibroids and normal blood test results  Considered PCOS as a potential cause for irregular periods due to insulin resistance  Determined further GYN evaluation is necessary  PLAN:     1. Uncontrolled type 2 diabetes mellitus with hyperglycemia, without long-term current use of insulin  - Continued Ozempic injections.  - Started glimepiride, new prescription sent to [facility] pharmacy.  - glimepiride (AMARYL) 2 MG tablet; Take 1 tablet (2 mg total) by mouth before breakfast.  Dispense: 60 tablet; Refill: 3    2. Class 2 severe obesity due to excess calories with serious comorbidity and body mass index (BMI) of 37.0 to 37.9 in adult  - Discussed how Ozempic can affect appetite and  eating habits. Continue ozempic.    3. Moderate episode of recurrent major depressive disorder  - Increased Zoloft to 50 mg, new prescription sent to [facility] pharmacy.  - sertraline (ZOLOFT) 50 MG tablet; Take 1 tablet (50 mg total) by mouth once daily.  Dispense: 90 each; Refill: 3    4. Amenorrhea  - Ambulatory referral/consult to Obstetrics / Gynecology; Future    5. Wound cellulitis  - Explained that keeping the wound covered will help it heal faster due to the dry, calloused nature of the skin in that area.  - Breanna to keep wound covered with antibiotic ointment or Vaseline.  - Breanna to change wound dressing 1 time daily.    6. Indigestion  - Started Pepcid for indigestion related to Ozempic.  - Continued OTC Pepto-Bismol as needed for indigestion.  - famotidine (PEPCID) 40 MG tablet; Take 1 tablet (40 mg total) by mouth once daily.  Dispense: 90 tablet; Refill: 3    BREAST CANCER SCREENING and galactorrhea:  - Breanna to schedule mammogram using provided contact information.      Dominique Alex MD  10/09/2024 10:05 AM        Follow up in about 2 months (around 12/9/2024).    This note was generated with the assistance of ambient listening technology. Verbal consent was obtained by the patient and accompanying visitor(s) for the recording of patient appointment to facilitate this note. I attest to having reviewed and edited the generated note for accuracy, though some syntax or spelling errors may persist. Please contact the author of this note for any clarification.

## 2024-10-16 DIAGNOSIS — E11.9 TYPE 2 DIABETES MELLITUS WITHOUT COMPLICATION, UNSPECIFIED WHETHER LONG TERM INSULIN USE: ICD-10-CM

## 2024-10-16 DIAGNOSIS — E11.9 TYPE 2 DIABETES MELLITUS WITHOUT COMPLICATION: ICD-10-CM

## 2024-11-01 ENCOUNTER — HOSPITAL ENCOUNTER (EMERGENCY)
Facility: HOSPITAL | Age: 39
Discharge: HOME | End: 2024-11-01
Attending: EMERGENCY MEDICINE
Payer: COMMERCIAL

## 2024-11-01 VITALS
HEIGHT: 63 IN | BODY MASS INDEX: 37.21 KG/M2 | WEIGHT: 210 LBS | RESPIRATION RATE: 18 BRPM | SYSTOLIC BLOOD PRESSURE: 125 MMHG | OXYGEN SATURATION: 98 % | DIASTOLIC BLOOD PRESSURE: 77 MMHG | HEART RATE: 95 BPM | TEMPERATURE: 99 F

## 2024-11-01 DIAGNOSIS — L02.91 ABSCESS: Primary | ICD-10-CM

## 2024-11-01 LAB
BACTERIA #/AREA URNS HPF: NORMAL /HPF
BILIRUB UR QL STRIP: NEGATIVE
CLARITY UR: CLEAR
COLOR UR: YELLOW
GLUCOSE UR QL STRIP: ABNORMAL
HGB UR QL STRIP: NEGATIVE
KETONES UR QL STRIP: NEGATIVE
LEUKOCYTE ESTERASE UR QL STRIP: NEGATIVE
MICROSCOPIC COMMENT: NORMAL
NITRITE UR QL STRIP: NEGATIVE
PH UR STRIP: 6 [PH] (ref 5–8)
PROT UR QL STRIP: NEGATIVE
RBC #/AREA URNS HPF: 2 /HPF (ref 0–4)
SP GR UR STRIP: 1.02 (ref 1–1.03)
SQUAMOUS #/AREA URNS HPF: 5 /HPF
URN SPEC COLLECT METH UR: ABNORMAL
UROBILINOGEN UR STRIP-ACNC: NEGATIVE EU/DL
WBC #/AREA URNS HPF: 2 /HPF (ref 0–5)
YEAST URNS QL MICRO: NORMAL

## 2024-11-01 PROCEDURE — 81000 URINALYSIS NONAUTO W/SCOPE: CPT | Performed by: EMERGENCY MEDICINE

## 2024-11-01 PROCEDURE — 99284 EMERGENCY DEPT VISIT MOD MDM: CPT | Mod: 25

## 2024-11-01 PROCEDURE — 87070 CULTURE OTHR SPECIMN AEROBIC: CPT | Performed by: EMERGENCY MEDICINE

## 2024-11-01 PROCEDURE — 10060 I&D ABSCESS SIMPLE/SINGLE: CPT

## 2024-11-01 PROCEDURE — 63600175 PHARM REV CODE 636 W HCPCS: Performed by: EMERGENCY MEDICINE

## 2024-11-01 RX ORDER — IBUPROFEN 600 MG/1
600 TABLET ORAL EVERY 6 HOURS PRN
Qty: 20 TABLET | Refills: 0 | Status: SHIPPED | OUTPATIENT
Start: 2024-11-01

## 2024-11-01 RX ORDER — ACETAMINOPHEN 500 MG
500 TABLET ORAL EVERY 6 HOURS PRN
Qty: 13 TABLET | Refills: 0 | Status: SHIPPED | OUTPATIENT
Start: 2024-11-01

## 2024-11-01 RX ORDER — IBUPROFEN 600 MG/1
600 TABLET ORAL EVERY 6 HOURS PRN
Qty: 20 TABLET | Refills: 0 | Status: SHIPPED | OUTPATIENT
Start: 2024-11-01 | End: 2024-11-01

## 2024-11-01 RX ORDER — LIDOCAINE HYDROCHLORIDE 10 MG/ML
10 INJECTION, SOLUTION INFILTRATION; PERINEURAL
Status: COMPLETED | OUTPATIENT
Start: 2024-11-01 | End: 2024-11-01

## 2024-11-01 RX ORDER — ACETAMINOPHEN 500 MG
500 TABLET ORAL EVERY 6 HOURS PRN
Qty: 13 TABLET | Refills: 0 | Status: SHIPPED | OUTPATIENT
Start: 2024-11-01 | End: 2024-11-01

## 2024-11-01 RX ADMIN — LIDOCAINE HYDROCHLORIDE 10 ML: 10 INJECTION, SOLUTION INFILTRATION; PERINEURAL at 11:11

## 2024-11-01 NOTE — ED PROVIDER NOTES
"Encounter Date: 2024    SCRIBE #1 NOTE: I, Cathifloyd Ta, am scribing for, and in the presence of,  Amadeo Robles MD. I have scribed the following portions of the note - Other sections scribed: HPI, ROS, PE.       History     Chief Complaint   Patient presents with    Cyst     Patient with cyst to left axillary      This 39 y.o female with a medical history of Diabetes mellitus presents to the ED c/o a "boil" to the left axillary region for the last 2x weeks. Pt reports experiencing associated pain to the entire area. The symptoms are acute, constant and severe (9/10). No drainage. She states that she has experienced similar symptoms to the same location in the past and had to have the area incised and drained. No use of alcohol, drugs or tobacco. She denies fever, abdominal pain, or any other associated symptoms. No alleviating factors.     The history is provided by the patient.     Review of patient's allergies indicates:  No Known Allergies  Past Medical History:   Diagnosis Date    Diabetes mellitus     Fibroid, uterine     GERD (gastroesophageal reflux disease)     Ovarian cyst      Past Surgical History:   Procedure Laterality Date     SECTION, CLASSIC      x 2    CYST REMOVAL      from both breast x 2     Family History   Problem Relation Name Age of Onset    Hypertension Mother      Diabetes Father      Sickle cell trait Brother      Diabetes Maternal Grandmother       Social History     Tobacco Use    Smoking status: Never    Smokeless tobacco: Never   Substance Use Topics    Alcohol use: Yes     Comment: occasionally    Drug use: Not Currently     Types: Marijuana     Comment: occassionally     Review of Systems   Constitutional:  Negative for fever.   HENT:  Negative for sore throat.    Eyes:  Negative for visual disturbance.   Respiratory:  Negative for shortness of breath.    Cardiovascular:  Negative for chest pain.   Gastrointestinal:  Negative for abdominal pain and nausea. " "  Genitourinary:  Negative for dysuria.   Musculoskeletal:  Negative for back pain.   Skin:  Negative for rash.        (+) "boil" to the left axillary region with pain   Neurological:  Negative for weakness.   All other systems reviewed and are negative.      Physical Exam     Initial Vitals [11/01/24 1051]   BP Pulse Resp Temp SpO2   131/77 95 18 98.7 °F (37.1 °C) 97 %      MAP       --         Physical Exam    Nursing note and vitals reviewed.  Constitutional: She appears well-developed and well-nourished.   HENT:   Head: Normocephalic and atraumatic. Mouth/Throat: Oropharynx is clear and moist and mucous membranes are normal.   Eyes: Conjunctivae and EOM are normal. Pupils are equal, round, and reactive to light. Right conjunctiva is not injected. Left conjunctiva is not injected. No scleral icterus.   Neck: Neck supple.   Normal range of motion.   Full passive range of motion without pain.     Cardiovascular:  Normal rate, regular rhythm, S1 normal, S2 normal, normal heart sounds and normal pulses.     Exam reveals no gallop and no friction rub.       No murmur heard.  Pulses:       Radial pulses are 2+ on the right side and 2+ on the left side.   Pulmonary/Chest: Effort normal and breath sounds normal. No respiratory distress.   Abdominal: Abdomen is soft. She exhibits no distension. There is no abdominal tenderness.   Musculoskeletal:         General: No edema. Normal range of motion.      Cervical back: Full passive range of motion without pain, normal range of motion and neck supple.      Comments: Good active ROM of all extremities. No lower extremity edema or cyanosis.      Neurological: No cranial nerve deficit. Gait normal.   A&Ox4, normal speech.   Skin: Skin is warm. No ecchymosis and no rash noted.   There is a 4x4 cm area of fluctuance to the left axillary area.   Psychiatric: She has a normal mood and affect. Thought content normal.         ED Course   I & D - Incision and Drainage    Date/Time: " 11/4/2024 6:40 AM  Location procedure was performed: [facility] EMERGENCY DEPARTMENT    Performed by: Amadeo Robles MD  Authorized by: Amadeo Robles MD  Anesthesia: local infiltration    Anesthesia:  Local Anesthetic: lidocaine 1% without epinephrine    Patient sedated: no  Scalpel size: 11  Incision type: single straight  Complexity: simple  Drainage: pus  Drainage amount: copious  Wound treatment: incision and wound left open  Packing material: 1/4 in gauze  Complications: No  Estimated blood loss (mL): 3  Specimens: No  Implants: No  Patient tolerance: Patient tolerated the procedure well with no immediate complications        Labs Reviewed   URINALYSIS, REFLEX TO URINE CULTURE - Abnormal       Result Value    Specimen UA Urine, Clean Catch      Color, UA Yellow      Appearance, UA Clear      pH, UA 6.0      Specific Gravity, UA 1.025      Protein, UA Negative      Glucose, UA 3+ (*)     Ketones, UA Negative      Bilirubin (UA) Negative      Occult Blood UA Negative      Nitrite, UA Negative      Urobilinogen, UA Negative      Leukocytes, UA Negative      Narrative:     Specimen Source->Urine   CULTURE, AEROBIC  (SPECIFY SOURCE)    Aerobic Bacterial Culture No significant isolate     URINALYSIS MICROSCOPIC    RBC, UA 2      WBC, UA 2      Bacteria None      Yeast, UA None      Squam Epithel, UA 5      Microscopic Comment SEE COMMENT      Narrative:     Specimen Source->Urine          Imaging Results    None          Medications   LIDOcaine HCL 10 mg/ml (1%) injection 10 mL (10 mLs Infiltration Given by Other 11/1/24 1140)     Medical Decision Making  Differential diagnosis includes but is not limited to:  Abscess, cellulitis.    Per chart review, patient was seen in this ED on 2/20/2023, for an abscess to the right axilla. Bedside ultrasound showed a pocket of fluid and an I&D was performed with pus and bloody discharge. The wound was packed and nonadherent bandage was applied. Patient tolerated the procedure  well. She was subsequently placed on Bactrim for prophylaxis and instructed to return to the ED 2-3 days later to have the packing removed.    I&D successful with marked improvement symptoms.  Packed.  Vitals reassuring.  Discharging with outpatient follow-up.  To follow-up in 2-3 days for recheck. I discussed with the patient/family the diagnosis, treatment plan, indications for return to the emergency department, and for expected follow-up. The patient/family verbalized an understanding. The patient/family is asked if there are any questions or concerns. We discuss the case, until all issues are addressed to the patient/family's satisfaction. Patient/family understands and is agreeable to the plan.   Amadeo Robles    DISCLAIMER: This note was prepared with [employer] voice recognition transcription software. Garbled syntax, mangled pronouns, and other bizarre constructions may be attributed to that software system.      Amount and/or Complexity of Data Reviewed  Labs: ordered.    Risk  OTC drugs.  Prescription drug management.            Scribe Attestation:   Scribe #1: I performed the above scribed service and the documentation accurately describes the services I performed. I attest to the accuracy of the note.                         I, amadeo robles, personally performed the services described in this documentation. All medical record entries made by the scribe were at my direction and in my presence. I have reviewed the chart and agree that the record reflects my personal performance and is accurate and complete.      DISCLAIMER: This note was prepared with [employer] voice recognition transcription software. Garbled syntax, mangled pronouns, and other bizarre constructions may be attributed to that software system.       Clinical Impression:  Final diagnoses:  [L02.91] Abscess (Primary)          ED Disposition Condition    Discharge Stable          ED Prescriptions       Medication Sig Dispense Start Date End Date  Auth. Provider    ibuprofen (ADVIL,MOTRIN) 600 MG tablet  (Status: Discontinued) Take 1 tablet (600 mg total) by mouth every 6 (six) hours as needed for Pain. 20 tablet 11/1/2024 11/1/2024 Amadeo Robles MD    acetaminophen (TYLENOL) 500 MG tablet  (Status: Discontinued) Take 1 tablet (500 mg total) by mouth every 6 (six) hours as needed. 13 tablet 11/1/2024 11/1/2024 Amadeo Robles MD    acetaminophen (TYLENOL) 500 MG tablet Take 1 tablet (500 mg total) by mouth every 6 (six) hours as needed. 13 tablet 11/1/2024 -- Amadeo Robles MD    ibuprofen (ADVIL,MOTRIN) 600 MG tablet Take 1 tablet (600 mg total) by mouth every 6 (six) hours as needed for Pain. 20 tablet 11/1/2024 -- Amadeo Robles MD          Follow-up Information       Follow up With Specialties Details Why Contact Info    Dominique Alex MD Family Medicine Schedule an appointment as soon as possible for a visit in 2 days  [address] [place] [id number]  [phone]               Amadeo Robles MD  11/04/24 0692

## 2024-11-01 NOTE — DISCHARGE INSTRUCTIONS

## 2024-11-01 NOTE — Clinical Note
"Breanna Givens" Constantine was seen and treated in our emergency department on 11/1/2024.  She may return to work on 11/03/2024.       If you have any questions or concerns, please don't hesitate to call.      Amadeo Robles MD"

## 2024-11-03 LAB — BACTERIA SPEC AEROBE CULT: NORMAL

## 2024-11-04 ENCOUNTER — PATIENT OUTREACH (OUTPATIENT)
Dept: EMERGENCY MEDICINE | Facility: HOSPITAL | Age: 39
End: 2024-11-04
Payer: COMMERCIAL

## 2024-11-04 NOTE — PROGRESS NOTES
Patient was seen in the ED on 11/1/24. Phoned patient to assist with Post ED Discharge Navigation. Patient declined assistance scheduling a PCP follow-up within 7 days. Patient requested In Basket message to ED Provider. Patient was supposed to receive antibiotics, however non called in. Message sent for assistance.  Chhaya Cifuentes

## 2024-11-05 ENCOUNTER — PATIENT OUTREACH (OUTPATIENT)
Dept: EMERGENCY MEDICINE | Facility: HOSPITAL | Age: 39
End: 2024-11-05
Payer: COMMERCIAL

## 2024-11-05 NOTE — PROGRESS NOTES
Returned patient's call. Patient inquired about antibiotics that was supposed to be prescribed by ED Provider. Informed patient In Basket message sent to ED Provider.  Chhaya Cifuentes

## 2024-11-24 ENCOUNTER — HOSPITAL ENCOUNTER (EMERGENCY)
Facility: HOSPITAL | Age: 39
Discharge: HOME | End: 2024-11-24
Attending: EMERGENCY MEDICINE
Payer: COMMERCIAL

## 2024-11-24 VITALS
HEIGHT: 63 IN | SYSTOLIC BLOOD PRESSURE: 113 MMHG | HEART RATE: 90 BPM | DIASTOLIC BLOOD PRESSURE: 71 MMHG | BODY MASS INDEX: 37.21 KG/M2 | RESPIRATION RATE: 16 BRPM | OXYGEN SATURATION: 96 % | TEMPERATURE: 98 F | WEIGHT: 210 LBS

## 2024-11-24 DIAGNOSIS — R81 GLUCOSURIA: ICD-10-CM

## 2024-11-24 DIAGNOSIS — L73.2 HIDRADENITIS SUPPURATIVA: Primary | ICD-10-CM

## 2024-11-24 DIAGNOSIS — S29.011A INTERCOSTAL MUSCLE STRAIN, INITIAL ENCOUNTER: ICD-10-CM

## 2024-11-24 LAB
B-HCG UR QL: NEGATIVE
BILIRUB UR QL STRIP: NEGATIVE
CLARITY UR: CLEAR
COLOR UR: YELLOW
CTP QC/QA: YES
GLUCOSE UR QL STRIP: ABNORMAL
HGB UR QL STRIP: NEGATIVE
KETONES UR QL STRIP: NEGATIVE
LEUKOCYTE ESTERASE UR QL STRIP: NEGATIVE
NITRITE UR QL STRIP: NEGATIVE
PH UR STRIP: 6 [PH] (ref 5–8)
PROT UR QL STRIP: NEGATIVE
SP GR UR STRIP: 1.03 (ref 1–1.03)
URN SPEC COLLECT METH UR: ABNORMAL
UROBILINOGEN UR STRIP-ACNC: NEGATIVE EU/DL

## 2024-11-24 PROCEDURE — 81003 URINALYSIS AUTO W/O SCOPE: CPT | Performed by: PHYSICIAN ASSISTANT

## 2024-11-24 PROCEDURE — 96372 THER/PROPH/DIAG INJ SC/IM: CPT | Performed by: PHYSICIAN ASSISTANT

## 2024-11-24 PROCEDURE — 63600175 PHARM REV CODE 636 W HCPCS: Performed by: PHYSICIAN ASSISTANT

## 2024-11-24 PROCEDURE — 81025 URINE PREGNANCY TEST: CPT

## 2024-11-24 PROCEDURE — 99284 EMERGENCY DEPT VISIT MOD MDM: CPT | Mod: 25

## 2024-11-24 RX ORDER — SULFAMETHOXAZOLE AND TRIMETHOPRIM 800; 160 MG/1; MG/1
1 TABLET ORAL 2 TIMES DAILY
Qty: 14 TABLET | Refills: 0 | Status: SHIPPED | OUTPATIENT
Start: 2024-11-24 | End: 2024-12-01

## 2024-11-24 RX ORDER — MELOXICAM 7.5 MG/1
7.5 TABLET ORAL DAILY
Qty: 12 TABLET | Refills: 0 | Status: SHIPPED | OUTPATIENT
Start: 2024-11-24

## 2024-11-24 RX ORDER — KETOROLAC TROMETHAMINE 30 MG/ML
30 INJECTION, SOLUTION INTRAMUSCULAR; INTRAVENOUS
Status: COMPLETED | OUTPATIENT
Start: 2024-11-24 | End: 2024-11-24

## 2024-11-24 RX ORDER — LIDOCAINE 50 MG/G
1 PATCH TOPICAL DAILY
Qty: 6 PATCH | Refills: 0 | Status: SHIPPED | OUTPATIENT
Start: 2024-11-24

## 2024-11-24 RX ORDER — ORPHENADRINE CITRATE 100 MG/1
100 TABLET, EXTENDED RELEASE ORAL 2 TIMES DAILY
Qty: 10 TABLET | Refills: 0 | Status: SHIPPED | OUTPATIENT
Start: 2024-11-24 | End: 2024-11-29

## 2024-11-24 RX ADMIN — KETOROLAC TROMETHAMINE 30 MG: 30 INJECTION, SOLUTION INTRAMUSCULAR; INTRAVENOUS at 01:11

## 2024-11-24 NOTE — ED PROVIDER NOTES
Encounter Date: 11/24/2024    SCRIBE #1 NOTE: ISiri, vanessa scribing for, and in the presence of,  Judd Villar PA-C. I have scribed the following portions of the note - Other sections scribed: HPI, ROS.       History     Chief Complaint   Patient presents with    Abscess     Pt to ED c/o boil to left armpit that is recurrent about 2 x's per week . Reports the boil was drained here about 1 month ago but was not put on antibiotics. Now reporting pain to the whole left side of her body.      39 y.o. DF w/ PMHx of DM, uterine fibroids, ovarian cyst, GERD presents to the ED with a chief complaint of left-sided body pain for 3-4 days. She reports the pain has been worsening, is exacerbated by twisting and movement, and she has some intermittent numbness in her leg. Reports that she stands a lot for work, but denies any direct injury/trauma or recent strenuous activity. She notes the pain is most pronounced in her left hip and knee and also reports having dysuria. Attempted treatment with Ibuprofen without relief. Denies associated frequency, urgency, hematuria, vaginal pain, vaginal discharge, saddle anaesthesia, weakness, chest pain, dyspnea, nausea, vomiting, fever, rash or other associated symptoms.     Patient also notes a separate complaint regarding an abscess to her left axilla. She reports that it has been recurrent in the past two weeks, had it aspirated in the ED earlier this month, has been swelling and draining twice per week with her squeezing it to empty the contents, noting mal odor, but has noticed it will increase in size afterwards. Denies redness to the area at this time. Denies antiperspirant use. Reports a history of abscesses to both axilla and her groin area. Patient is requesting oral antibiotics. No other complaints at this time.     The history is provided by the patient. No  was used.     Review of patient's allergies indicates:  No Known Allergies  Past  Medical History:   Diagnosis Date    Diabetes mellitus     Fibroid, uterine     GERD (gastroesophageal reflux disease)     Ovarian cyst      Past Surgical History:   Procedure Laterality Date     SECTION, CLASSIC      x 2    CYST REMOVAL      from both breast x 2     Family History   Problem Relation Name Age of Onset    Hypertension Mother      Diabetes Father      Sickle cell trait Brother      Diabetes Maternal Grandmother       Social History     Tobacco Use    Smoking status: Never    Smokeless tobacco: Never   Substance Use Topics    Alcohol use: Yes     Comment: occasionally    Drug use: Not Currently     Types: Marijuana     Comment: occassionally     Review of Systems   Constitutional:  Negative for fever.   HENT:  Negative for congestion, sore throat and trouble swallowing.    Respiratory:  Negative for cough and shortness of breath.    Cardiovascular:  Negative for chest pain.   Gastrointestinal:  Negative for abdominal pain, constipation, diarrhea, nausea and vomiting.   Genitourinary:  Positive for dysuria. Negative for decreased urine volume, flank pain, frequency, hematuria, urgency, vaginal bleeding, vaginal discharge and vaginal pain.   Musculoskeletal:  Positive for arthralgias (L-sided, hip, knee) and myalgias (L side). Negative for back pain.   Skin:  Positive for wound (abscess L axilla). Negative for color change and rash.   Neurological:  Positive for numbness (intermittent, legs). Negative for weakness and headaches.   All other systems reviewed and are negative.      Physical Exam     Initial Vitals [24 1227]   BP Pulse Resp Temp SpO2   113/71 90 16 98.1 °F (36.7 °C) 96 %      MAP       --         Physical Exam    Nursing note and vitals reviewed.  Constitutional: She appears well-developed and well-nourished. She is not diaphoretic. No distress.   HENT:   Head: Atraumatic.   Right Ear: External ear normal.   Left Ear: External ear normal.   Eyes: Conjunctivae and EOM are  normal.   Neck: No tracheal deviation present. No JVD present.   Normal range of motion.  Cardiovascular:  Normal rate and regular rhythm.           Pulmonary/Chest: No accessory muscle usage or stridor. No tachypnea. No respiratory distress.   Abdominal: Abdomen is soft. She exhibits no distension. There is no abdominal tenderness. There is no guarding.   Musculoskeletal:         General: Normal range of motion.      Cervical back: Normal range of motion.      Comments: Reproducible and positional tenderness to the left flank intercostal musculature without bony tenderness or overlying skin abnormalities.  No CVA tenderness     Neurological: She is alert and oriented to person, place, and time. She displays no tremor. She displays no seizure activity. Coordination and gait normal.   Skin: Skin is intact. No rash noted. No pallor.   Area of swelling and fluctuance to the left axilla without tenderness, erythema, or drainage.  Full ROM of left shoulder.         ED Course   Procedures  Labs Reviewed   URINALYSIS, REFLEX TO URINE CULTURE - Abnormal       Result Value    Specimen UA Urine, Clean Catch      Color, UA Yellow      Appearance, UA Clear      pH, UA 6.0      Specific Gravity, UA 1.030      Protein, UA Negative      Glucose, UA 1+ (*)     Ketones, UA Negative      Bilirubin (UA) Negative      Occult Blood UA Negative      Nitrite, UA Negative      Urobilinogen, UA Negative      Leukocytes, UA Negative      Narrative:     Specimen Source->Urine   POCT URINE PREGNANCY    POC Preg Test, Ur Negative       Acceptable Yes            Imaging Results    None          Medications   ketorolac injection 30 mg (30 mg Intramuscular Given 11/24/24 1316)     Medical Decision Making  Multiple complaints.      Known history of hidradenitis.  Requesting antibiotics for remote history of abscess to left axilla but does not have active complaints or concerns regarding her left axilla today.  No signs of cellulitis.   There is an area of fluctuance but nontender; low risk for abscess.  I see no indication for antibiotics today, but patient is very adamant about having antibiotics available to her; I will honor her wish with patient understanding there are risks to taking antibiotics and this is unlikely to have any beneficial effect today.  Patient understands antibiotics have side effects and resistance patterns can develop.    Flank pain consistent with intercostal muscle strain.  No UTI.  No hematuria suggestive of ureteral stone.  No overlying cellulitis or shingles.  I doubt dissection and pulmonary embolism.  No cardiopulmonary symptoms.    Amount and/or Complexity of Data Reviewed  Labs: ordered. Decision-making details documented in ED Course.    Risk  Prescription drug management.            Scribe Attestation:   Scribe #1: I performed the above scribed service and the documentation accurately describes the services I performed. I attest to the accuracy of the note.                               Clinical Impression:  Final diagnoses:  [L73.2] Hidradenitis suppurativa (Primary)  [S29.011A] Intercostal muscle strain, initial encounter  [R81] Glucosuria          ED Disposition Condition    Discharge Stable          ED Prescriptions       Medication Sig Dispense Start Date End Date Auth. Provider    sulfamethoxazole-trimethoprim 800-160mg (BACTRIM DS) 800-160 mg Tab Take 1 tablet by mouth 2 (two) times daily. for 7 days 14 tablet 11/24/2024 12/1/2024 Judd Villar PA-C    orphenadrine (NORFLEX) 100 mg tablet Take 1 tablet (100 mg total) by mouth 2 (two) times daily. for 5 days 10 tablet 11/24/2024 11/29/2024 Judd Villar PA-C    meloxicam (MOBIC) 7.5 MG tablet Take 1 tablet (7.5 mg total) by mouth once daily. 12 tablet 11/24/2024 -- Judd Villar PA-C    LIDOcaine (LIDODERM) 5 % Place 1 patch onto the skin once daily. Remove & Discard patch within 12 hours or as directed by MD. May use 4% formulation if more  affordable for patient. 6 patch 11/24/2024 -- Judd Villar PA-C          Follow-up Information       Follow up With Specialties Details Why Contact Info    Dominique Alex MD Family Medicine Schedule an appointment as soon as possible for a visit in 1 day For re-evaluation [address] [facility]  [address]  [facility] [address]  [phone]      [facility] - Emergency Dept Emergency Medicine Go to  If symptoms worsen or new symptoms develop [address] [facility] [place] [address] [phone]          I, Judd Villar PA-C, personally performed the services described in this documentation. All medical record entries made by the scribe were at my direction and in my presence. I have reviewed the chart and agree that the record reflects my personal performance and is accurate and complete.      DISCLAIMER: This note was prepared with [employer] voice recognition transcription software. Garbled syntax, mangled pronouns, and other bizarre constructions may be attributed to that software system.       Judd Villar PA-C  11/24/24 1420

## 2024-11-24 NOTE — DISCHARGE INSTRUCTIONS
Thank you for coming to our Emergency Department today. It is important to remember that some problems or medical conditions are difficult to diagnose and may not be found or addressed during your Emergency Department visit.  These conditions often start with non-specific symptoms and can only be diagnosed on follow up visits with your primary care physician or specialist when the symptoms continue or change. Please remember that all medical conditions can change, and we cannot predict how you will be feeling tomorrow or the next day. Return to the ER with any questions/concerns, new/concerning symptoms, worsening or failure to improve.       Be sure to follow up with your primary care doctor and review all labs/imaging/tests that were performed during your ER visit with them. It is very common for us to identify non-emergent incidental findings which must be followed up with your primary care physician.  Some labs/imaging/tests may be outside of the normal range, and require non-emergent follow-up and/or further investigation/treatment/procedures/testing to help diagnose/exclude/prevent complications or other potentially serious medical conditions. Some abnormalities may not have been discussed or addressed during your ER visit.     An ER visit does not replace a primary care visit, and many screening tests or follow-up tests cannot be ordered by an ER doctor or performed by the ER. Some tests may even require pre-approval.    If you do not have a primary care doctor, you may contact the one listed on your discharge paperwork or you may also call the [facility] Appointment Desk at [phone] , or [address] [place] [address] at  [phone] to schedule an appointment, or establish care with a primary care doctor or even a specialist and to obtain information about local resources. It is important to your health that you have a primary care doctor.    Please take all medications as directed. We have done our best to select  a medication for you that will treat your condition however, all medications may potentially have side-effects and it is impossible to predict which medications may give you side-effects or what those side-effects (if any) those medications may give you.  If you feel that you are having a negative effect or side-effect of any medication you should stop taking those medications immediately and seek medical attention. If you feel that you are having a life-threatening reaction call 911.        Do not drive, swim, climb to height, take a bath, operate heavy machinery, drink alcohol or take potentially sedating medications, sign any legal documents or make any important decisions for 24 hours if you have received any pain medications, sedatives or mood altering drugs during your ER visit or within 24 hours of taking them if they have been prescribed to you.     You can find additional resources for Dentists, hearing aids, durable medical equipment, low cost pharmacies and other resources at [email or web address]

## 2024-11-25 ENCOUNTER — PATIENT OUTREACH (OUTPATIENT)
Dept: EMERGENCY MEDICINE | Facility: HOSPITAL | Age: 39
End: 2024-11-25
Payer: COMMERCIAL

## 2024-11-26 NOTE — PROGRESS NOTES
Patient was seen in the ED on 11/24/24. Phoned patient on 2 separate occasions to assist with Post ED Discharge Navigation. Patient was unavailable.   Chhaya Cifuentes

## 2024-12-04 DIAGNOSIS — E11.9 TYPE 2 DIABETES MELLITUS WITHOUT COMPLICATION: ICD-10-CM

## 2024-12-11 ENCOUNTER — PATIENT MESSAGE (OUTPATIENT)
Facility: CLINIC | Age: 39
End: 2024-12-11
Payer: COMMERCIAL

## 2024-12-31 ENCOUNTER — HOSPITAL ENCOUNTER (EMERGENCY)
Facility: HOSPITAL | Age: 39
Discharge: HOME | End: 2024-12-31
Attending: EMERGENCY MEDICINE
Payer: COMMERCIAL

## 2024-12-31 VITALS
WEIGHT: 207 LBS | SYSTOLIC BLOOD PRESSURE: 119 MMHG | OXYGEN SATURATION: 99 % | RESPIRATION RATE: 18 BRPM | HEART RATE: 106 BPM | BODY MASS INDEX: 36.67 KG/M2 | DIASTOLIC BLOOD PRESSURE: 76 MMHG | TEMPERATURE: 99 F

## 2024-12-31 DIAGNOSIS — L02.31 ABSCESS AND CELLULITIS OF GLUTEAL REGION: Primary | ICD-10-CM

## 2024-12-31 DIAGNOSIS — L03.317 ABSCESS AND CELLULITIS OF GLUTEAL REGION: Primary | ICD-10-CM

## 2024-12-31 PROCEDURE — 25000003 PHARM REV CODE 250: Performed by: PHYSICIAN ASSISTANT

## 2024-12-31 PROCEDURE — 10061 I&D ABSCESS COMP/MULTIPLE: CPT

## 2024-12-31 PROCEDURE — 99284 EMERGENCY DEPT VISIT MOD MDM: CPT | Mod: 25

## 2024-12-31 PROCEDURE — 63600175 PHARM REV CODE 636 W HCPCS: Performed by: PHYSICIAN ASSISTANT

## 2024-12-31 RX ORDER — LIDOCAINE HYDROCHLORIDE 10 MG/ML
10 INJECTION, SOLUTION INFILTRATION; PERINEURAL
Status: COMPLETED | OUTPATIENT
Start: 2024-12-31 | End: 2024-12-31

## 2024-12-31 RX ORDER — SULFAMETHOXAZOLE AND TRIMETHOPRIM 800; 160 MG/1; MG/1
1 TABLET ORAL 2 TIMES DAILY
Qty: 20 TABLET | Refills: 0 | Status: SHIPPED | OUTPATIENT
Start: 2024-12-31 | End: 2025-01-10

## 2024-12-31 RX ORDER — HYDROCODONE BITARTRATE AND ACETAMINOPHEN 5; 325 MG/1; MG/1
1 TABLET ORAL EVERY 6 HOURS PRN
Qty: 12 TABLET | Refills: 0 | Status: SHIPPED | OUTPATIENT
Start: 2024-12-31 | End: 2025-01-03

## 2024-12-31 RX ORDER — ONDANSETRON 4 MG/1
4 TABLET, ORALLY DISINTEGRATING ORAL
Status: COMPLETED | OUTPATIENT
Start: 2024-12-31 | End: 2024-12-31

## 2024-12-31 RX ORDER — SULFAMETHOXAZOLE AND TRIMETHOPRIM 800; 160 MG/1; MG/1
1 TABLET ORAL
Status: COMPLETED | OUTPATIENT
Start: 2024-12-31 | End: 2024-12-31

## 2024-12-31 RX ORDER — HYDROCODONE BITARTRATE AND ACETAMINOPHEN 5; 325 MG/1; MG/1
1 TABLET ORAL
Status: COMPLETED | OUTPATIENT
Start: 2024-12-31 | End: 2024-12-31

## 2024-12-31 RX ADMIN — SULFAMETHOXAZOLE AND TRIMETHOPRIM 1 TABLET: 800; 160 TABLET ORAL at 02:12

## 2024-12-31 RX ADMIN — ONDANSETRON 4 MG: 4 TABLET, ORALLY DISINTEGRATING ORAL at 02:12

## 2024-12-31 RX ADMIN — LIDOCAINE HYDROCHLORIDE 10 ML: 10 INJECTION, SOLUTION INFILTRATION; PERINEURAL at 01:12

## 2024-12-31 RX ADMIN — HYDROCODONE BITARTRATE AND ACETAMINOPHEN 1 TABLET: 5; 325 TABLET ORAL at 02:12

## 2024-12-31 NOTE — ED PROVIDER NOTES
"Encounter Date: 2024       History     Chief Complaint   Patient presents with    Abscess     Pt reporting a painful large abscess to her buttock area. Pt reports treating at home with "PRID". Pt denies any drainage.       39-year-old female with history of diabetes presents to the emergency department for 4 day history of worsening mass to the left gluteal cleft with associated erythema that is similar to past abscesses.  Never had an abscess in this region, however.  Denies objective fever, nausea, vomiting, and abdominal pain.  No medication prior to arrival.    The history is provided by the patient.     Review of patient's allergies indicates:  No Known Allergies  Past Medical History:   Diagnosis Date    Diabetes mellitus     Fibroid, uterine     GERD (gastroesophageal reflux disease)     Ovarian cyst      Past Surgical History:   Procedure Laterality Date     SECTION, CLASSIC      x 2    CYST REMOVAL      from both breast x 2     Family History   Problem Relation Name Age of Onset    Hypertension Mother      Diabetes Father      Sickle cell trait Brother      Diabetes Maternal Grandmother       Social History     Tobacco Use    Smoking status: Never    Smokeless tobacco: Never   Substance Use Topics    Alcohol use: Yes     Comment: occasionally    Drug use: Yes     Types: Marijuana     Comment: occassionally     Review of Systems   Constitutional:  Negative for fever.   HENT:  Negative for congestion, sore throat and trouble swallowing.    Respiratory:  Negative for cough and shortness of breath.    Cardiovascular:  Negative for chest pain.   Gastrointestinal:  Negative for abdominal pain, constipation, diarrhea, nausea and vomiting.   Genitourinary:  Negative for dysuria, flank pain, frequency and urgency.   Musculoskeletal:  Negative for back pain.   Skin:  Positive for color change.        Gluteal pain   Neurological:  Negative for headaches.   All other systems reviewed and are " negative.      Physical Exam     Initial Vitals [12/31/24 1327]   BP Pulse Resp Temp SpO2   119/76 106 18 99.2 °F (37.3 °C) 99 %      MAP       --         Physical Exam    Nursing note and vitals reviewed.  Constitutional: She appears well-developed and well-nourished. She is not diaphoretic. No distress.   HENT:   Head: Atraumatic.   Right Ear: External ear normal.   Left Ear: External ear normal.   Eyes: Conjunctivae and EOM are normal.   Neck: No tracheal deviation present. No JVD present.   Normal range of motion.  Cardiovascular:  Normal rate and regular rhythm.           Pulmonary/Chest: No accessory muscle usage or stridor. No tachypnea. No respiratory distress.   Musculoskeletal:         General: Normal range of motion.      Cervical back: Normal range of motion.     Neurological: She is alert and oriented to person, place, and time. She displays no tremor. She displays no seizure activity. Coordination and gait normal.   Skin: Skin is intact.   Erythema, tenderness, and induration to the left gluteal cleft without active drainage or fistula.  No sacral involvement.         ED Course   I & D - Incision and Drainage    Date/Time: 12/31/2024 2:30 PM  Location procedure was performed: [facility] EMERGENCY DEPARTMENT    Performed by: Judd Villar PA-C  Authorized by: Deepak Avlarez MD  Consent Done: Yes  Consent: Verbal consent obtained.  Consent given by: patient  Type: abscess  Location: L gluteal cleft.  Anesthesia: local infiltration    Anesthesia:  Local Anesthetic: lidocaine 1% without epinephrine  Scalpel size: 11  Incision type: single straight  Complexity: complex  Drainage: pus  Drainage amount: copious  Wound treatment: incision, wound left open, expression of material and wound packed  Packing material: 1/4 in gauze  Complications: No  Specimens: No  Implants: No  Patient tolerance: Patient tolerated the procedure well with no immediate complications        Labs Reviewed - No data to display        Imaging Results    None          Medications   LIDOcaine HCL 10 mg/ml (1%) injection 10 mL (10 mLs Infiltration Given by Provider 12/31/24 9061)   HYDROcodone-acetaminophen 5-325 mg per tablet 1 tablet (1 tablet Oral Given 12/31/24 1429)   sulfamethoxazole-trimethoprim 800-160mg per tablet 1 tablet (1 tablet Oral Given 12/31/24 1429)   ondansetron disintegrating tablet 4 mg (4 mg Oral Given 12/31/24 1429)     Medical Decision Making    This is an emergent evaluation of a 39 y.o. female presenting to the ED for abscess. Afebrile. Patient is non-toxic appearing and in no acute distress. Presentation consistent with soft tissue abscess associated with cellulitis. No symptoms for systemic infection or evidence of necrotizing fasciitis.      Fluid collection drained and packed. Discharged with antibiotics. Instructed to follow up with PCP for packing removal in 48 hours.     I discussed with the patient the diagnosis, treatment plan, indications for return to the emergency department, and for expected follow-up. The patient verbalized an understanding. The patient is asked if there are any questions or concerns. We discuss the case, until all issues are addressed to the patient's satisfaction. Patient understands and is agreeable to the plan.               Risk  Prescription drug management.                                      Clinical Impression:  Final diagnoses:  [L02.31, L03.317] Abscess and cellulitis of gluteal region (Primary)          ED Disposition Condition    Discharge Stable          ED Prescriptions       Medication Sig Dispense Start Date End Date Auth. Provider    sulfamethoxazole-trimethoprim 800-160mg (BACTRIM DS) 800-160 mg Tab Take 1 tablet by mouth 2 (two) times daily. for 10 days 20 tablet 12/31/2024 1/10/2025 Judd Villar PA-C    HYDROcodone-acetaminophen (NORCO) 5-325 mg per tablet Take 1 tablet by mouth every 6 (six) hours as needed for Pain. 12 tablet 12/31/2024 1/3/2025 Judd Villar  DAVON OSMAN          Follow-up Information       Follow up With Specialties Details Why Contact Info    Dominique Alex MD Family Medicine Schedule an appointment as soon as possible for a visit in 1 day For re-evaluation, AND packing removal in 2 days [address] [place] [id number]  [phone]      [facility] [place] Go to  If symptoms worsen or new symptoms develop [address] [facility] [place] [address] [phone]             Judd Villar PA-C  12/31/24 1433

## 2024-12-31 NOTE — DISCHARGE INSTRUCTIONS
Thank you for coming to our Emergency Department today. It is important to remember that some problems or medical conditions are difficult to diagnose and may not be found or addressed during your Emergency Department visit.  These conditions often start with non-specific symptoms and can only be diagnosed on follow up visits with your primary care physician or specialist when the symptoms continue or change. Please remember that all medical conditions can change, and we cannot predict how you will be feeling tomorrow or the next day. Return to the ER with any questions/concerns, new/concerning symptoms, worsening or failure to improve.       Be sure to follow up with your primary care doctor and review all labs/imaging/tests that were performed during your ER visit with them. It is very common for us to identify non-emergent incidental findings which must be followed up with your primary care physician.  Some labs/imaging/tests may be outside of the normal range, and require non-emergent follow-up and/or further investigation/treatment/procedures/testing to help diagnose/exclude/prevent complications or other potentially serious medical conditions. Some abnormalities may not have been discussed or addressed during your ER visit.     An ER visit does not replace a primary care visit, and many screening tests or follow-up tests cannot be ordered by an ER doctor or performed by the ER. Some tests may even require pre-approval.    If you do not have a primary care doctor, you may contact the one listed on your discharge paperwork or you may also call the [facility] Appointment Desk at [phone] , or [address] [place] [address] at  [phone] to schedule an appointment, or establish care with a primary care doctor or even a specialist and to obtain information about local resources. It is important to your health that you have a primary care doctor.    Please take all medications as directed. We have done our best to select  a medication for you that will treat your condition however, all medications may potentially have side-effects and it is impossible to predict which medications may give you side-effects or what those side-effects (if any) those medications may give you.  If you feel that you are having a negative effect or side-effect of any medication you should stop taking those medications immediately and seek medical attention. If you feel that you are having a life-threatening reaction call 911.        Do not drive, swim, climb to height, take a bath, operate heavy machinery, drink alcohol or take potentially sedating medications, sign any legal documents or make any important decisions for 24 hours if you have received any pain medications, sedatives or mood altering drugs during your ER visit or within 24 hours of taking them if they have been prescribed to you.     You can find additional resources for Dentists, hearing aids, durable medical equipment, low cost pharmacies and other resources at [email or web address]

## 2025-01-16 ENCOUNTER — OFFICE VISIT (OUTPATIENT)
Facility: CLINIC | Age: 40
End: 2025-01-16
Payer: COMMERCIAL

## 2025-01-16 VITALS
DIASTOLIC BLOOD PRESSURE: 78 MMHG | HEIGHT: 63 IN | RESPIRATION RATE: 18 BRPM | TEMPERATURE: 98 F | HEART RATE: 118 BPM | BODY MASS INDEX: 37.62 KG/M2 | SYSTOLIC BLOOD PRESSURE: 104 MMHG | OXYGEN SATURATION: 98 % | WEIGHT: 212.31 LBS

## 2025-01-16 DIAGNOSIS — E11.65 UNCONTROLLED TYPE 2 DIABETES MELLITUS WITH HYPERGLYCEMIA, WITHOUT LONG-TERM CURRENT USE OF INSULIN: ICD-10-CM

## 2025-01-16 DIAGNOSIS — E66.01 CLASS 2 SEVERE OBESITY DUE TO EXCESS CALORIES WITH SERIOUS COMORBIDITY AND BODY MASS INDEX (BMI) OF 37.0 TO 37.9 IN ADULT: ICD-10-CM

## 2025-01-16 DIAGNOSIS — E66.812 CLASS 2 SEVERE OBESITY DUE TO EXCESS CALORIES WITH SERIOUS COMORBIDITY AND BODY MASS INDEX (BMI) OF 37.0 TO 37.9 IN ADULT: ICD-10-CM

## 2025-01-16 DIAGNOSIS — F33.1 MODERATE EPISODE OF RECURRENT MAJOR DEPRESSIVE DISORDER: ICD-10-CM

## 2025-01-16 DIAGNOSIS — U07.1 COVID-19: ICD-10-CM

## 2025-01-16 DIAGNOSIS — J11.1 INFLUENZA: Primary | ICD-10-CM

## 2025-01-16 LAB
CTP QC/QA: YES
CTP QC/QA: YES
POC MOLECULAR INFLUENZA A AGN: POSITIVE
POC MOLECULAR INFLUENZA B AGN: NEGATIVE
SARS-COV-2 AG RESP QL IA.RAPID: POSITIVE

## 2025-01-16 PROCEDURE — 87811 SARS-COV-2 COVID19 W/OPTIC: CPT | Mod: QW,S$GLB,, | Performed by: STUDENT IN AN ORGANIZED HEALTH CARE EDUCATION/TRAINING PROGRAM

## 2025-01-16 PROCEDURE — 3074F SYST BP LT 130 MM HG: CPT | Mod: CPTII,S$GLB,, | Performed by: STUDENT IN AN ORGANIZED HEALTH CARE EDUCATION/TRAINING PROGRAM

## 2025-01-16 PROCEDURE — 87502 INFLUENZA DNA AMP PROBE: CPT | Mod: QW,S$GLB,, | Performed by: STUDENT IN AN ORGANIZED HEALTH CARE EDUCATION/TRAINING PROGRAM

## 2025-01-16 PROCEDURE — 1159F MED LIST DOCD IN RCRD: CPT | Mod: CPTII,S$GLB,, | Performed by: STUDENT IN AN ORGANIZED HEALTH CARE EDUCATION/TRAINING PROGRAM

## 2025-01-16 PROCEDURE — 99999 PR PBB SHADOW E&M-EST. PATIENT-LVL IV: CPT | Mod: PBBFAC,,, | Performed by: STUDENT IN AN ORGANIZED HEALTH CARE EDUCATION/TRAINING PROGRAM

## 2025-01-16 PROCEDURE — 3008F BODY MASS INDEX DOCD: CPT | Mod: CPTII,S$GLB,, | Performed by: STUDENT IN AN ORGANIZED HEALTH CARE EDUCATION/TRAINING PROGRAM

## 2025-01-16 PROCEDURE — 99214 OFFICE O/P EST MOD 30 MIN: CPT | Mod: S$GLB,,, | Performed by: STUDENT IN AN ORGANIZED HEALTH CARE EDUCATION/TRAINING PROGRAM

## 2025-01-16 PROCEDURE — 3078F DIAST BP <80 MM HG: CPT | Mod: CPTII,S$GLB,, | Performed by: STUDENT IN AN ORGANIZED HEALTH CARE EDUCATION/TRAINING PROGRAM

## 2025-01-16 PROCEDURE — 1160F RVW MEDS BY RX/DR IN RCRD: CPT | Mod: CPTII,S$GLB,, | Performed by: STUDENT IN AN ORGANIZED HEALTH CARE EDUCATION/TRAINING PROGRAM

## 2025-01-16 RX ORDER — ONDANSETRON 4 MG/1
4 TABLET, ORALLY DISINTEGRATING ORAL 2 TIMES DAILY
Qty: 14 TABLET | Refills: 0 | Status: SHIPPED | OUTPATIENT
Start: 2025-01-16 | End: 2025-01-23

## 2025-01-16 RX ORDER — GUAIFENESIN 600 MG/1
1200 TABLET, EXTENDED RELEASE ORAL 2 TIMES DAILY
Qty: 40 TABLET | Refills: 0 | Status: SHIPPED | OUTPATIENT
Start: 2025-01-16 | End: 2025-01-26

## 2025-01-16 RX ORDER — NIRMATRELVIR AND RITONAVIR 300-100 MG
KIT ORAL
Qty: 30 TABLET | Refills: 0 | Status: SHIPPED | OUTPATIENT
Start: 2025-01-16 | End: 2025-01-21

## 2025-01-16 NOTE — PROGRESS NOTES
SUBJECTIVE     Chief Complaint   Patient presents with    Cough    Generalized Body Aches    Chills       HPI  Breanna Fitch is a 39 y.o. female with multiple medical diagnoses as listed in the medical history and problem list that presents for evaluationof URI.    Upper Respiratory Infection: Patient complains of symptoms of a URI. Symptoms include congestion, cough, fever, and nausea . Onset of symptoms was 3 days ago, rapidly worsening since that time. She denies hemoptysis, chest pain, abdominal pain, dysuria, diarrhea, rash.  She is drinking moderate amounts of fluids. Evaluation to date: none. Treatment to date: none.      PAST MEDICAL HISTORY:  Past Medical History:   Diagnosis Date    Diabetes mellitus     Fibroid, uterine     GERD (gastroesophageal reflux disease)     Ovarian cyst        ALLERGIES AND MEDICATIONS: updated and reviewed.  Review of patient's allergies indicates:  No Known Allergies  Current Outpatient Medications   Medication Sig Dispense Refill    semaglutide (OZEMPIC) 1 mg/dose (4 mg/3 mL) Inject 1 mg into the skin every 7 days. 3 mL 11    guaiFENesin (MUCINEX) 600 mg 12 hr tablet Take 2 tablets (1,200 mg total) by mouth 2 (two) times daily. for 10 days 40 tablet 0    nirmatrelvir-ritonavir (PAXLOVID) 300 mg (150 mg x 2)-100 mg copackaged tablets (EUA) Take 3 tablets by mouth 2 (two) times daily. Each dose contains 2 nirmatrelvir (pink tablets) and 1 ritonavir (white tablet). Take all 3 tablets together 30 tablet 0    ondansetron (ZOFRAN-ODT) 4 MG TbDL Take 1 tablet (4 mg total) by mouth 2 (two) times daily. for 7 days 14 tablet 0    sertraline (ZOLOFT) 50 MG tablet Take 1 tablet (50 mg total) by mouth once daily. (Patient not taking: Reported on 1/16/2025) 90 each 3     No current facility-administered medications for this visit.       ROS  Review of Systems   Constitutional:  Positive for activity change, appetite change, chills, fatigue and fever.   HENT:  Positive for rhinorrhea.  "Negative for sore throat.    Respiratory:  Positive for cough. Negative for shortness of breath and wheezing.    Cardiovascular:  Negative for chest pain and palpitations.   Gastrointestinal:  Negative for constipation, diarrhea, nausea and vomiting.   Genitourinary:  Negative for dysuria.   Musculoskeletal:  Negative for joint swelling.   Skin:  Negative for rash and wound.   Neurological:  Negative for light-headedness and headaches.   Psychiatric/Behavioral:  Negative for dysphoric mood and sleep disturbance. The patient is not nervous/anxious.          OBJECTIVE     Physical Exam  Vitals:    01/16/25 0945   BP: 104/78   Pulse: (!) 118   Resp: 18   Temp: 97.9 °F (36.6 °C)    Body mass index is 37.61 kg/m².  Weight: 96.3 kg (212 lb 4.9 oz)   Height: 5' 3" (160 cm)     Physical Exam  Vitals reviewed.   Constitutional:       General: She is not in acute distress.     Appearance: She is ill-appearing.   HENT:      Right Ear: External ear normal.      Left Ear: External ear normal.      Nose: Nose normal.      Mouth/Throat:      Mouth: Mucous membranes are moist.   Eyes:      Extraocular Movements: Extraocular movements intact.      Conjunctiva/sclera: Conjunctivae normal.      Pupils: Pupils are equal, round, and reactive to light.   Pulmonary:      Effort: Pulmonary effort is normal.   Abdominal:      General: There is no distension.      Palpations: Abdomen is soft.   Musculoskeletal:         General: No swelling. Normal range of motion.      Cervical back: Normal range of motion.   Skin:     General: Skin is warm and dry.      Findings: No rash.   Neurological:      General: No focal deficit present.      Mental Status: She is alert and oriented to person, place, and time.   Psychiatric:         Mood and Affect: Mood normal.         Behavior: Behavior normal.           Health Maintenance         Date Due Completion Date    Diabetes Urine Screening Never done ---    Foot Exam Never done ---    Diabetic Eye Exam " Never done ---    TETANUS VACCINE Never done ---    Pneumococcal Vaccines (Age 0-49) (1 of 2 - PCV) Never done ---    Cervical Cancer Screening 11/25/2020 11/25/2019    Mammogram 01/09/2024 1/9/2023    Influenza Vaccine (1) Never done ---    COVID-19 Vaccine (3 - 2024-25 season) 09/01/2024 8/2/2021    Hemoglobin A1c 11/28/2024 8/28/2024    Lipid Panel 08/28/2025 8/28/2024    RSV Vaccine (Age 60+ and Pregnant patients) (1 - 1-dose 75+ series) 10/08/2059 ---              ASSESSMENT     39 y.o. female with     1. Influenza    2. COVID-19    3. Class 2 severe obesity due to excess calories with serious comorbidity and body mass index (BMI) of 37.0 to 37.9 in adult    4. Moderate episode of recurrent major depressive disorder    5. Uncontrolled type 2 diabetes mellitus with hyperglycemia, without long-term current use of insulin        PLAN:     1. Influenza  - In office antigen test for influenza positive. Patient was counseled to self-monitor for symptom worsening, including weakness, SOB, fatigue, vomiting, diarrhea; and present to the ED if worsened. Patient was counseled to self-isolate for 5 days and continue with mask wearing and frequent hand hygiene. Finally, patient was counseled to continue supportive care including rest, fluids, mucinex if needed. RTC if worsening, or if not improving 5-7 days.   - SARS Coronavirus 2 Antigen, POCT Manual Read  - POCT Influenza A/B Molecular  - POCT rapid strep A  - ondansetron (ZOFRAN-ODT) 4 MG TbDL; Take 1 tablet (4 mg total) by mouth 2 (two) times daily. for 7 days  Dispense: 14 tablet; Refill: 0  - guaiFENesin (MUCINEX) 600 mg 12 hr tablet; Take 2 tablets (1,200 mg total) by mouth 2 (two) times daily. for 10 days  Dispense: 40 tablet; Refill: 0    2. COVID-19  - In office antigen test for covid19 positive. Patient was counseled to self-monitor for symptom worsening, including weakness, SOB, fatigue, vomiting, diarrhea; and present to the ED if worsened. Patient was  counseled to self-isolate for 5 days and continue with mask wearing and frequent hand hygiene. Finally, patient was counseled to continue supportive care including rest, fluids, mucinex if needed. RTC if worsening, or if not improving 5-7 days.   - SARS Coronavirus 2 Antigen, POCT Manual Read  - POCT Influenza A/B Molecular  - POCT rapid strep A  - nirmatrelvir-ritonavir (PAXLOVID) 300 mg (150 mg x 2)-100 mg copackaged tablets (EUA); Take 3 tablets by mouth 2 (two) times daily. Each dose contains 2 nirmatrelvir (pink tablets) and 1 ritonavir (white tablet). Take all 3 tablets together  Dispense: 30 tablet; Refill: 0  - ondansetron (ZOFRAN-ODT) 4 MG TbDL; Take 1 tablet (4 mg total) by mouth 2 (two) times daily. for 7 days  Dispense: 14 tablet; Refill: 0  - guaiFENesin (MUCINEX) 600 mg 12 hr tablet; Take 2 tablets (1,200 mg total) by mouth 2 (two) times daily. for 10 days  Dispense: 40 tablet; Refill: 0    3. Class 2 severe obesity due to excess calories with serious comorbidity and body mass index (BMI) of 37.0 to 37.9 in adult  - Noted. Pt counseled on diet and exercise for healthy lifestyle.    4. Moderate episode of recurrent major depressive disorder  - Stable, continue current regimen.     5. Uncontrolled type 2 diabetes mellitus with hyperglycemia, without long-term current use of insulin  - Improving. Patient is encouraged to follow a diet low in carbohydrates and simple sugars. Advised to focus on good food choices and increased physical activity and encouraged to adhere to medication regimen and/or lifestyle adjustments, and to check glucose level as recommended.  Contact office if glucose levels are not improving over time.  Will monitor HbA1c appropriately.         Dominique Alex MD  01/16/2025 11:06 AM        Follow up if symptoms worsen or fail to improve.

## 2025-01-16 NOTE — PATIENT INSTRUCTIONS
Medicines:  -Medicines may be needed to stop the cough, decrease swelling in your airways, or help open your airways. Medicine may also be given to help you cough up mucus. If you have an infection caused by bacteria, you may need antibiotics, but they are not helpful for viral infections.  -Take your medicine as directed. Contact me if you think your medicine is not helping or if you have side effects.     Manage your symptoms:  -Do not smoke and stay away from others who smoke. Nicotine and other chemicals in cigarettes and cigars can cause lung damage and make your cough worse.  -Drink extra liquids especially hot liquids such as herbal/decaf tea, broth, or hot water with honey and lemon (avoid honey if you have diabetes). Honey can help thin mucus and decrease your cough. Liquids will help thin and loosen mucus so you can cough it up. Liquids will also help prevent dehydration. Do not drink liquids that contain caffeine. Caffeine can increase your risk for dehydration.   -Rest as much as possible. You may do a lighter version of your typical exercise routine as this has not been shown to prolong your symptoms, but listen to your body and rest if needed.  -Use a humidifier. Use a cool mist humidifier or a hot shower to increase air moisture in your home. This may make it easier for you to breathe and help decrease your cough.  -Use cough drops or lozenges. These can help decrease throat irritation and your cough. Again, watch for sugar content if you have diabetes.    Dr. Alex's Chicken Soup Recipe  Ingredients:  2 lbs plain raw chicken wings  water  1 tsp oil  3 carrots, chopped into large chunks  1 bunch scallions  1 package shiitake mushrooms (optional)  1 large piece raw brisa, peeled  4 garlic cloves, smashed  ¼ tsp turmeric  ½ tsp black pepper  2 tsp salt    Heat large pot over medium high heat for 1 minute.   Add 1 tsp oil, then chicken wings. Saute 6-8 minutes without stirring.   Add in water to fill  the pot ¾ full, then add the rest of the ingredients. Stir.  Bring to a boil, then turn heat down to low and simmer for 4-8 hours. Strain out solids if desired. Add cooked rice or noodles as per your preference. Add salt and lemon juice to taste. Can also be made in crock pot.

## 2025-01-16 NOTE — LETTER
January 16, 2025      [facility]  [address]  [place] [address]  Phone: [phone]  Fax: [phone]       Patient: Breanna Fitch   YOB: 1985  Date of Visit: 01/16/2025    To Whom It May Concern:    Heide Fitch  was at [facility] on 01/16/2025. The patient is under my care for an acute illness. The patient may return to work on 1/21/25 with no restrictions. If you have any questions or concerns, or if I can be of further assistance, please do not hesitate to contact me.    Sincerely,    Dominique Alex MD

## 2025-01-17 ENCOUNTER — PATIENT MESSAGE (OUTPATIENT)
Facility: CLINIC | Age: 40
End: 2025-01-17
Payer: COMMERCIAL

## 2025-02-26 ENCOUNTER — PATIENT MESSAGE (OUTPATIENT)
Dept: ADMINISTRATIVE | Facility: HOSPITAL | Age: 40
End: 2025-02-26
Payer: COMMERCIAL

## 2025-04-02 ENCOUNTER — TELEPHONE (OUTPATIENT)
Facility: CLINIC | Age: 40
End: 2025-04-02
Payer: COMMERCIAL

## 2025-04-02 DIAGNOSIS — E66.01 CLASS 2 SEVERE OBESITY DUE TO EXCESS CALORIES WITH SERIOUS COMORBIDITY AND BODY MASS INDEX (BMI) OF 37.0 TO 37.9 IN ADULT: Primary | ICD-10-CM

## 2025-04-02 DIAGNOSIS — E66.812 CLASS 2 SEVERE OBESITY DUE TO EXCESS CALORIES WITH SERIOUS COMORBIDITY AND BODY MASS INDEX (BMI) OF 37.0 TO 37.9 IN ADULT: Primary | ICD-10-CM

## 2025-04-02 DIAGNOSIS — E11.65 UNCONTROLLED TYPE 2 DIABETES MELLITUS WITH HYPERGLYCEMIA, WITHOUT LONG-TERM CURRENT USE OF INSULIN: ICD-10-CM

## 2025-04-02 NOTE — TELEPHONE ENCOUNTER
----- Message from Med Assistant Lozano sent at 4/2/2025  1:18 PM CDT -----  Pt states tmrw she can ome in at 3pm fr labs, cans you put order in for A1c  ----- Message -----  From: Dominique Alex MD  Sent: 4/2/2025  11:35 AM CDT  To: Isai Fox Staff    Please call pt to schedule lab appt for A1C. Thanks!

## 2025-04-15 ENCOUNTER — PATIENT MESSAGE (OUTPATIENT)
Dept: ADMINISTRATIVE | Facility: HOSPITAL | Age: 40
End: 2025-04-15
Payer: COMMERCIAL

## 2025-04-15 ENCOUNTER — PATIENT OUTREACH (OUTPATIENT)
Dept: ADMINISTRATIVE | Facility: HOSPITAL | Age: 40
End: 2025-04-15
Payer: COMMERCIAL

## 2025-04-15 NOTE — PROGRESS NOTES
VBHM Score: 5     Urine Screening  Eye Exam  Hemoglobin A1c  Mammogram  Foot Exam                 Spoke with  reviewed SDOH  No needs at this time  Scheduled office visit with pap  Was scheduling mammogram when she disconnected  Called back it went to voice mail  Will send portal message

## 2025-06-12 ENCOUNTER — PATIENT OUTREACH (OUTPATIENT)
Dept: ADMINISTRATIVE | Facility: HOSPITAL | Age: 40
End: 2025-06-12
Payer: COMMERCIAL

## 2025-06-12 NOTE — LETTER
AUTHORIZATION FOR RELEASE OF   CONFIDENTIAL INFORMATION      We are seeing Breanna Fitch, date of birth 1985, in the clinic at [facility]. Dominique Alex MD is the patient's PCP. Breanna Fitch has an outstanding lab/procedure at the time we reviewed her chart. In order to help keep her health information updated, she has authorized us to request the following medical record(s):        (  )  MAMMOGRAM                                      (  )  COLONOSCOPY      (  )  PAP SMEAR                                          (  )  OUTSIDE LAB RESULTS     (  )  DEXA SCAN                                          (  XX)  EYE EXAM            (  )  FOOT EXAM                                          (  )  ENTIRE RECORD     (  )  OUTSIDE IMMUNIZATIONS                 (  )  _______________         Please fax records to [facility], [place], [place], MD, [phone]      Patient Name: Breanna Fitch  : 1985  Patient Phone #: [phone]

## 2025-06-12 NOTE — PROGRESS NOTES
Health Maintenance Due   Topic Date Due    Diabetes Urine Screening  Never done    Foot Exam  Never done    Diabetic Eye Exam  Never done    TETANUS VACCINE  Never done    Pneumococcal Vaccines (Age 0-49) (1 of 2 - PCV) Never done    Mammogram  01/09/2024    COVID-19 Vaccine (3 - 2024-25 season) 09/01/2024    Hemoglobin A1c  11/28/2024    Cervical Cancer Screening  09/12/2025     Mammogram scheduled  Had an appointment at [facility]

## 2025-06-17 ENCOUNTER — LAB VISIT (OUTPATIENT)
Dept: LAB | Facility: HOSPITAL | Age: 40
End: 2025-06-17
Attending: STUDENT IN AN ORGANIZED HEALTH CARE EDUCATION/TRAINING PROGRAM
Payer: COMMERCIAL

## 2025-06-17 ENCOUNTER — OFFICE VISIT (OUTPATIENT)
Facility: CLINIC | Age: 40
End: 2025-06-17
Payer: COMMERCIAL

## 2025-06-17 ENCOUNTER — PATIENT OUTREACH (OUTPATIENT)
Dept: ADMINISTRATIVE | Facility: OTHER | Age: 40
End: 2025-06-17
Payer: COMMERCIAL

## 2025-06-17 VITALS
TEMPERATURE: 97 F | HEART RATE: 89 BPM | DIASTOLIC BLOOD PRESSURE: 66 MMHG | WEIGHT: 217.94 LBS | SYSTOLIC BLOOD PRESSURE: 98 MMHG | OXYGEN SATURATION: 96 % | HEIGHT: 63 IN | BODY MASS INDEX: 38.62 KG/M2 | RESPIRATION RATE: 18 BRPM

## 2025-06-17 DIAGNOSIS — Z12.4 CERVICAL CANCER SCREENING: ICD-10-CM

## 2025-06-17 DIAGNOSIS — E11.65 UNCONTROLLED TYPE 2 DIABETES MELLITUS WITH HYPERGLYCEMIA, WITHOUT LONG-TERM CURRENT USE OF INSULIN: ICD-10-CM

## 2025-06-17 DIAGNOSIS — E66.812 CLASS 2 SEVERE OBESITY DUE TO EXCESS CALORIES WITH SERIOUS COMORBIDITY AND BODY MASS INDEX (BMI) OF 37.0 TO 37.9 IN ADULT: Primary | ICD-10-CM

## 2025-06-17 DIAGNOSIS — E66.812 CLASS 2 SEVERE OBESITY DUE TO EXCESS CALORIES WITH SERIOUS COMORBIDITY AND BODY MASS INDEX (BMI) OF 37.0 TO 37.9 IN ADULT: ICD-10-CM

## 2025-06-17 DIAGNOSIS — L73.2 HIDRADENITIS: ICD-10-CM

## 2025-06-17 DIAGNOSIS — E66.01 CLASS 2 SEVERE OBESITY DUE TO EXCESS CALORIES WITH SERIOUS COMORBIDITY AND BODY MASS INDEX (BMI) OF 37.0 TO 37.9 IN ADULT: Primary | ICD-10-CM

## 2025-06-17 DIAGNOSIS — Z11.3 ROUTINE SCREENING FOR STI (SEXUALLY TRANSMITTED INFECTION): ICD-10-CM

## 2025-06-17 DIAGNOSIS — E66.01 CLASS 2 SEVERE OBESITY DUE TO EXCESS CALORIES WITH SERIOUS COMORBIDITY AND BODY MASS INDEX (BMI) OF 37.0 TO 37.9 IN ADULT: ICD-10-CM

## 2025-06-17 DIAGNOSIS — Z12.31 ENCOUNTER FOR SCREENING MAMMOGRAM FOR MALIGNANT NEOPLASM OF BREAST: ICD-10-CM

## 2025-06-17 LAB
ABSOLUTE EOSINOPHIL (OHS): 0.06 K/UL
ABSOLUTE MONOCYTE (OHS): 0.38 K/UL (ref 0.3–1)
ABSOLUTE NEUTROPHIL COUNT (OHS): 4.07 K/UL (ref 1.8–7.7)
ALBUMIN SERPL BCP-MCNC: 3.4 G/DL (ref 3.5–5.2)
ALBUMIN/CREAT UR: 4.4 UG/MG
ALP SERPL-CCNC: 124 UNIT/L (ref 40–150)
ALT SERPL W/O P-5'-P-CCNC: 16 UNIT/L (ref 10–44)
ANION GAP (OHS): 10 MMOL/L (ref 8–16)
AST SERPL-CCNC: 11 UNIT/L (ref 11–45)
BASOPHILS # BLD AUTO: 0.02 K/UL
BASOPHILS NFR BLD AUTO: 0.3 %
BILIRUB SERPL-MCNC: 0.2 MG/DL (ref 0.1–1)
BUN SERPL-MCNC: 12 MG/DL (ref 6–20)
CALCIUM SERPL-MCNC: 8.8 MG/DL (ref 8.7–10.5)
CHLORIDE SERPL-SCNC: 102 MMOL/L (ref 95–110)
CHOLEST SERPL-MCNC: 173 MG/DL (ref 120–199)
CHOLEST/HDLC SERPL: 4.9 {RATIO} (ref 2–5)
CO2 SERPL-SCNC: 23 MMOL/L (ref 23–29)
CREAT SERPL-MCNC: 0.8 MG/DL (ref 0.5–1.4)
CREAT UR-MCNC: 136 MG/DL (ref 15–325)
ERYTHROCYTE [DISTWIDTH] IN BLOOD BY AUTOMATED COUNT: 12 % (ref 11.5–14.5)
GFR SERPLBLD CREATININE-BSD FMLA CKD-EPI: >60 ML/MIN/1.73/M2
GLUCOSE SERPL-MCNC: 308 MG/DL (ref 70–110)
HBV SURFACE AG SERPL QL IA: NORMAL
HCT VFR BLD AUTO: 40.4 % (ref 37–48.5)
HCV AB SERPL QL IA: NORMAL
HDLC SERPL-MCNC: 35 MG/DL (ref 40–75)
HDLC SERPL: 20.2 % (ref 20–50)
HGB BLD-MCNC: 12.4 GM/DL (ref 12–16)
HIV 1+2 AB+HIV1 P24 AG SERPL QL IA: NORMAL
IMM GRANULOCYTES # BLD AUTO: 0.02 K/UL (ref 0–0.04)
IMM GRANULOCYTES NFR BLD AUTO: 0.3 % (ref 0–0.5)
LDLC SERPL CALC-MCNC: 81.6 MG/DL (ref 63–159)
LYMPHOCYTES # BLD AUTO: 3.26 K/UL (ref 1–4.8)
MCH RBC QN AUTO: 27.3 PG (ref 27–31)
MCHC RBC AUTO-ENTMCNC: 30.7 G/DL (ref 32–36)
MCV RBC AUTO: 89 FL (ref 82–98)
MICROALBUMIN UR-MCNC: 6 UG/ML (ref ?–5000)
NONHDLC SERPL-MCNC: 138 MG/DL
NUCLEATED RBC (/100WBC) (OHS): 0 /100 WBC
PLATELET # BLD AUTO: 273 K/UL (ref 150–450)
PMV BLD AUTO: 11.2 FL (ref 9.2–12.9)
POTASSIUM SERPL-SCNC: 4 MMOL/L (ref 3.5–5.1)
PROT SERPL-MCNC: 8 GM/DL (ref 6–8.4)
RBC # BLD AUTO: 4.54 M/UL (ref 4–5.4)
RELATIVE EOSINOPHIL (OHS): 0.8 %
RELATIVE LYMPHOCYTE (OHS): 41.7 % (ref 18–48)
RELATIVE MONOCYTE (OHS): 4.9 % (ref 4–15)
RELATIVE NEUTROPHIL (OHS): 52 % (ref 38–73)
SODIUM SERPL-SCNC: 135 MMOL/L (ref 136–145)
T PALLIDUM IGG+IGM SER QL: NORMAL
TRIGL SERPL-MCNC: 282 MG/DL (ref 30–150)
TSH SERPL-ACNC: 2.78 UIU/ML (ref 0.4–4)
WBC # BLD AUTO: 7.81 K/UL (ref 3.9–12.7)

## 2025-06-17 PROCEDURE — 3078F DIAST BP <80 MM HG: CPT | Mod: CPTII,S$GLB,, | Performed by: STUDENT IN AN ORGANIZED HEALTH CARE EDUCATION/TRAINING PROGRAM

## 2025-06-17 PROCEDURE — 82040 ASSAY OF SERUM ALBUMIN: CPT

## 2025-06-17 PROCEDURE — 82465 ASSAY BLD/SERUM CHOLESTEROL: CPT

## 2025-06-17 PROCEDURE — 86803 HEPATITIS C AB TEST: CPT

## 2025-06-17 PROCEDURE — 3074F SYST BP LT 130 MM HG: CPT | Mod: CPTII,S$GLB,, | Performed by: STUDENT IN AN ORGANIZED HEALTH CARE EDUCATION/TRAINING PROGRAM

## 2025-06-17 PROCEDURE — 99999 PR PBB SHADOW E&M-EST. PATIENT-LVL IV: CPT | Mod: PBBFAC,,, | Performed by: STUDENT IN AN ORGANIZED HEALTH CARE EDUCATION/TRAINING PROGRAM

## 2025-06-17 PROCEDURE — 3008F BODY MASS INDEX DOCD: CPT | Mod: CPTII,S$GLB,, | Performed by: STUDENT IN AN ORGANIZED HEALTH CARE EDUCATION/TRAINING PROGRAM

## 2025-06-17 PROCEDURE — 1159F MED LIST DOCD IN RCRD: CPT | Mod: CPTII,S$GLB,, | Performed by: STUDENT IN AN ORGANIZED HEALTH CARE EDUCATION/TRAINING PROGRAM

## 2025-06-17 PROCEDURE — 86593 SYPHILIS TEST NON-TREP QUANT: CPT

## 2025-06-17 PROCEDURE — 99214 OFFICE O/P EST MOD 30 MIN: CPT | Mod: 25,S$GLB,, | Performed by: STUDENT IN AN ORGANIZED HEALTH CARE EDUCATION/TRAINING PROGRAM

## 2025-06-17 PROCEDURE — 87389 HIV-1 AG W/HIV-1&-2 AB AG IA: CPT

## 2025-06-17 PROCEDURE — 84443 ASSAY THYROID STIM HORMONE: CPT

## 2025-06-17 PROCEDURE — 87340 HEPATITIS B SURFACE AG IA: CPT

## 2025-06-17 PROCEDURE — 36415 COLL VENOUS BLD VENIPUNCTURE: CPT | Mod: PN

## 2025-06-17 PROCEDURE — 1160F RVW MEDS BY RX/DR IN RCRD: CPT | Mod: CPTII,S$GLB,, | Performed by: STUDENT IN AN ORGANIZED HEALTH CARE EDUCATION/TRAINING PROGRAM

## 2025-06-17 PROCEDURE — 85025 COMPLETE CBC W/AUTO DIFF WBC: CPT

## 2025-06-17 PROCEDURE — 83036 HEMOGLOBIN GLYCOSYLATED A1C: CPT

## 2025-06-17 RX ORDER — SPIRONOLACTONE 25 MG/1
25 TABLET ORAL DAILY
Qty: 90 TABLET | Refills: 3 | Status: SHIPPED | OUTPATIENT
Start: 2025-06-17 | End: 2026-06-17

## 2025-06-17 RX ORDER — KETOCONAZOLE 20 MG/ML
SHAMPOO, SUSPENSION TOPICAL
COMMUNITY

## 2025-06-17 RX ORDER — DOXYCYCLINE 100 MG/1
100 CAPSULE ORAL 2 TIMES DAILY
Qty: 28 CAPSULE | Refills: 0 | Status: SHIPPED | OUTPATIENT
Start: 2025-06-17 | End: 2025-07-01

## 2025-06-17 NOTE — PROGRESS NOTES
"SUBJECTIVE     Chief Complaint   Patient presents with    Gynecologic Exam     Pt is here for an annual cervical exam       History of Present Illness    CHIEF COMPLAINT:  Patient presents today for annual well-woman exam with Pap smear.    GYNECOLOGICAL HISTORY:  She reports last menstrual period approximately two years ago and history of two pregnancies. She is currently sexually active.    MEDICAL HISTORY:  She has diabetes managed with medication but reports side effects including nausea and stomach discomfort, which she manages with Pepto Bismol. She is not monitoring glucose. She has hyper dry eyes which is currently active. She has hidradenitis with current flare involving legs, presenting with drainage and formation of holes. Previous mammogram completed.      ROS:  General: -fever, -chills, -fatigue, -weight gain, -weight loss  Eyes: -vision changes, -redness, -discharge, +dry eyes  ENT: -ear pain, -nasal congestion, -sore throat  Cardiovascular: -chest pain, -palpitations, -lower extremity edema  Respiratory: -cough, -shortness of breath  Gastrointestinal: -abdominal pain, +nausea, -vomiting, -diarrhea, -constipation, -blood in stool  Genitourinary: -dysuria, -hematuria, -frequency  Musculoskeletal: -joint pain, -muscle pain  Skin: -rash, +lesion  Neurological: -headache, -dizziness, -numbness, -tingling  Psychiatric: -anxiety, -depression, -sleep difficulty         PAST MEDICAL HISTORY:  Past Medical History:   Diagnosis Date    Diabetes mellitus     Fibroid, uterine     GERD (gastroesophageal reflux disease)     Ovarian cyst        ALLERGIES AND MEDICATIONS: updated and reviewed.  Review of patient's allergies indicates:  No Known Allergies  Current Medications[1]      OBJECTIVE     Physical Exam  Vitals:    06/17/25 0848   BP: 98/66   Pulse: 89   Resp: 18   Temp: 97.3 °F (36.3 °C)    Body mass index is 38.6 kg/m².  Weight: 98.9 kg (217 lb 14.8 oz)   Height: 5' 3" (160 cm)     Physical Exam  Vitals " reviewed. Exam conducted with a chaperone present.   Constitutional:       General: She is not in acute distress.  HENT:      Right Ear: External ear normal.      Left Ear: External ear normal.      Nose: Nose normal.      Mouth/Throat:      Mouth: Mucous membranes are moist.   Eyes:      Extraocular Movements: Extraocular movements intact.      Conjunctiva/sclera: Conjunctivae normal.      Pupils: Pupils are equal, round, and reactive to light.   Pulmonary:      Effort: Pulmonary effort is normal.   Abdominal:      General: There is no distension.      Palpations: Abdomen is soft.      Hernia: There is no hernia in the right inguinal area.   Genitourinary:     General: Normal vulva.      Exam position: Lithotomy position.      Pubic Area: No rash or pubic lice.       Devon stage (genital): 5.      Labia:         Right: No rash, tenderness or lesion.         Left: No rash, tenderness or lesion.       Urethra: No prolapse.      Vagina: Normal.      Cervix: Normal.   Musculoskeletal:         General: No swelling. Normal range of motion.      Cervical back: Normal range of motion.   Lymphadenopathy:      Lower Body: No right inguinal adenopathy.   Skin:     General: Skin is warm and dry.      Findings: No rash.   Neurological:      General: No focal deficit present.      Mental Status: She is alert and oriented to person, place, and time.   Psychiatric:         Mood and Affect: Mood normal.         Behavior: Behavior normal.           Health Maintenance         Date Due Completion Date    Diabetes Urine Screening Never done ---    Foot Exam Never done ---    Diabetic Eye Exam Never done ---    TETANUS VACCINE Never done ---    Pneumococcal Vaccines (Age 0-49) (1 of 2 - PCV) Never done ---    Mammogram 01/09/2024 1/9/2023    COVID-19 Vaccine (3 - 2024-25 season) 09/01/2024 8/2/2021    Hemoglobin A1c 11/28/2024 8/28/2024    Cervical Cancer Screening 09/12/2025 9/12/2022    Lipid Panel 08/28/2025 8/28/2024    Influenza  Vaccine (Season Ended) 09/01/2025 ---    RSV Vaccine (Age 60+ and Pregnant patients) (1 - 1-dose 75+ series) 10/08/2059 ---              ASSESSMENT     39 y.o. female with     1. Class 2 severe obesity due to excess calories with serious comorbidity and body mass index (BMI) of 37.0 to 37.9 in adult    2. Uncontrolled type 2 diabetes mellitus with hyperglycemia, without long-term current use of insulin    3. Cervical cancer screening    4. Hidradenitis    5. Routine screening for STI (sexually transmitted infection)    6. Encounter for screening mammogram for malignant neoplasm of breast      E11.43 Type 2 diabetes mellitus with diabetic autonomic (poly)neuropathy  L73.2 Hidradenitis suppurativa  H04.123 Dry eye syndrome of bilateral lacrimal glands  R11.0 Nausea    IMPRESSION:  Assessed hidradenitis suppurativa and determined treatment plan.  Initiated two-phase treatment approach: initial antibiotic therapy followed by maintenance medication.  Considered potential side effects of spironolactone and need for potassium monitoring.  Evaluated need for STD testing and mammogram based on age and risk factors.  Performed Pap smear with HPV co-testing for cervical cancer screening.  PLAN:     1. Class 2 severe obesity due to excess calories with serious comorbidity and body mass index (BMI) of 37.0 to 37.9 in adult  - Noted. Pt counseled on diet and exercise for healthy lifestyle.  - Microalbumin/creatinine urine ratio  - Hemoglobin A1C; Future  - Lipid Panel; Future  - TSH; Future  - Comprehensive Metabolic Panel; Future  - CBC Auto Differential; Future    2. Uncontrolled type 2 diabetes mellitus with hyperglycemia, without long-term current use of insulin  - Ordered A1C test as the patient is due for A1C check.  - Microalbumin/creatinine urine ratio  - Hemoglobin A1C; Future  - Lipid Panel; Future  - TSH; Future  - Comprehensive Metabolic Panel; Future  - CBC Auto Differential; Future    3. Cervical cancer screening  -  Performed Pap smear with HPV co-testing today and ordered STD testing.  - Provided information on the testing process and result timeline.  - Liquid-Based Pap Smear, Screening  - HPV High Risk Genotypes, PCR    4. Hidradenitis  - Confirmed presence of hidradenitis upon exam, which patient reports is flaring up with drainage and holes in the legs.  - Explained rationale behind two-phase treatment approach: prescribed doxycycline 100mg twice daily for 2 weeks, followed by daily spironolactone after completing the antibiotic course.  - Ordered potassium levels prior to initiating spironolactone therapy and discussed importance of potassium monitoring with this medication.  - doxycycline (VIBRAMYCIN) 100 MG Cap; Take 1 capsule (100 mg total) by mouth 2 (two) times a day. for 14 days  Dispense: 28 capsule; Refill: 0  - spironolactone (ALDACTONE) 25 MG tablet; Take 1 tablet (25 mg total) by mouth once daily.  Dispense: 90 tablet; Refill: 3    5. Routine screening for STI (sexually transmitted infection)  - Due, ordered.  - Hepatitis B Surface Antigen; Future  - C. trachomatis/N. gonorrhoeae by AMP DNA [facility]; Urine; Future  - Hepatitis C Antibody; Future  - HIV 1/2 Ag/Ab (4th Gen); Future  - Treponema Pallidium Antibodies IgG, IgM; Future  - C. trachomatis/N. gonorrhoeae by AMP DNA [facility]; Urine    DRY EYE SYNDROME:  - Patient reports having dry eyes.    NAUSEA AND GASTROINTESTINAL ISSUES:  - Patient reports stomach bubbling and emesis.  - Currently using Pepto Bismol for symptomatic relief as recommended.  - Offered Zofran for nausea management, but patient declined.    WOMEN'S HEALTH SCREENING:  - Educated patient on current recommendations regarding breast exams and ordered mammogram.             Dominique Alex MD  06/17/2025 9:02 AM        Follow up in about 3 months (around 9/17/2025).    This note was generated with the assistance of ambient listening technology. Verbal consent was obtained by the patient and  accompanying visitor(s) for the recording of patient appointment to facilitate this note. I attest to having reviewed and edited the generated note for accuracy, though some syntax or spelling errors may persist. Please contact the author of this note for any clarification.                     [1]   Current Outpatient Medications   Medication Sig Dispense Refill    semaglutide (OZEMPIC) 1 mg/dose (4 mg/3 mL) Inject 1 mg into the skin every 7 days. 3 mL 11    doxycycline (VIBRAMYCIN) 100 MG Cap Take 1 capsule (100 mg total) by mouth 2 (two) times a day. for 14 days 28 capsule 0    ketoconazole (NIZORAL) 2 % shampoo LOCO TO SCALP Q WEEK External for 15      sertraline (ZOLOFT) 50 MG tablet Take 1 tablet (50 mg total) by mouth once daily. (Patient not taking: Reported on 1/16/2025) 90 each 3    spironolactone (ALDACTONE) 25 MG tablet Take 1 tablet (25 mg total) by mouth once daily. 90 tablet 3     No current facility-administered medications for this visit.

## 2025-06-17 NOTE — PROGRESS NOTES
CHW - Initial Contact    This Community Health Worker updated the Social Determinant of Health questionnaire with patient during clinic visit today.    Pt identified barriers of most importance are: Patient did not report any barriers.         Other information discussed the patient needs / wants help with:  SDOH updated. Patient did not report any barriers at this time.  CHW advised patient if the need arise to reach out to me directly. Patient's case will be closed at this time  Follow up required: No.

## 2025-06-18 ENCOUNTER — RESULTS FOLLOW-UP (OUTPATIENT)
Facility: CLINIC | Age: 40
End: 2025-06-18

## 2025-06-18 DIAGNOSIS — E66.01 CLASS 2 SEVERE OBESITY DUE TO EXCESS CALORIES WITH SERIOUS COMORBIDITY AND BODY MASS INDEX (BMI) OF 37.0 TO 37.9 IN ADULT: Primary | ICD-10-CM

## 2025-06-18 DIAGNOSIS — E66.812 CLASS 2 SEVERE OBESITY DUE TO EXCESS CALORIES WITH SERIOUS COMORBIDITY AND BODY MASS INDEX (BMI) OF 37.0 TO 37.9 IN ADULT: Primary | ICD-10-CM

## 2025-06-18 DIAGNOSIS — E11.65 UNCONTROLLED TYPE 2 DIABETES MELLITUS WITH HYPERGLYCEMIA, WITHOUT LONG-TERM CURRENT USE OF INSULIN: ICD-10-CM

## 2025-06-18 LAB
EAG (OHS): 298 MG/DL (ref 68–131)
HBA1C MFR BLD: 12 % (ref 4–5.6)

## 2025-06-18 RX ORDER — DAPAGLIFLOZIN 10 MG/1
10 TABLET, FILM COATED ORAL DAILY
Qty: 90 TABLET | Refills: 3 | Status: SHIPPED | OUTPATIENT
Start: 2025-06-18 | End: 2026-06-18

## 2025-06-19 LAB
C TRACH DNA SPEC QL NAA+PROBE: NOT DETECTED
CTGC SOURCE (OHS) ORD-325: NORMAL
N GONORRHOEA DNA UR QL NAA+PROBE: NOT DETECTED